# Patient Record
Sex: MALE | Race: BLACK OR AFRICAN AMERICAN | NOT HISPANIC OR LATINO | Employment: OTHER | URBAN - METROPOLITAN AREA
[De-identification: names, ages, dates, MRNs, and addresses within clinical notes are randomized per-mention and may not be internally consistent; named-entity substitution may affect disease eponyms.]

---

## 2017-04-03 ENCOUNTER — OFFICE VISIT (OUTPATIENT)
Dept: INTERNAL MEDICINE | Facility: CLINIC | Age: 72
End: 2017-04-03
Payer: MEDICARE

## 2017-04-03 ENCOUNTER — TELEPHONE (OUTPATIENT)
Dept: INTERNAL MEDICINE | Facility: CLINIC | Age: 72
End: 2017-04-03

## 2017-04-03 VITALS
DIASTOLIC BLOOD PRESSURE: 82 MMHG | SYSTOLIC BLOOD PRESSURE: 130 MMHG | BODY MASS INDEX: 24.64 KG/M2 | HEIGHT: 71 IN | HEART RATE: 64 BPM | WEIGHT: 176 LBS

## 2017-04-03 DIAGNOSIS — I10 ESSENTIAL HYPERTENSION: Primary | ICD-10-CM

## 2017-04-03 DIAGNOSIS — L02.91 ABSCESS: Primary | ICD-10-CM

## 2017-04-03 PROCEDURE — 3079F DIAST BP 80-89 MM HG: CPT | Mod: S$GLB,,, | Performed by: INTERNAL MEDICINE

## 2017-04-03 PROCEDURE — 1160F RVW MEDS BY RX/DR IN RCRD: CPT | Mod: S$GLB,,, | Performed by: INTERNAL MEDICINE

## 2017-04-03 PROCEDURE — 1125F AMNT PAIN NOTED PAIN PRSNT: CPT | Mod: S$GLB,,, | Performed by: INTERNAL MEDICINE

## 2017-04-03 PROCEDURE — 99213 OFFICE O/P EST LOW 20 MIN: CPT | Mod: S$GLB,,, | Performed by: INTERNAL MEDICINE

## 2017-04-03 PROCEDURE — 1159F MED LIST DOCD IN RCRD: CPT | Mod: S$GLB,,, | Performed by: INTERNAL MEDICINE

## 2017-04-03 PROCEDURE — 99999 PR PBB SHADOW E&M-EST. PATIENT-LVL III: CPT | Mod: PBBFAC,,, | Performed by: INTERNAL MEDICINE

## 2017-04-03 PROCEDURE — 3075F SYST BP GE 130 - 139MM HG: CPT | Mod: S$GLB,,, | Performed by: INTERNAL MEDICINE

## 2017-04-03 PROCEDURE — 1157F ADVNC CARE PLAN IN RCRD: CPT | Mod: S$GLB,,, | Performed by: INTERNAL MEDICINE

## 2017-04-03 RX ORDER — LOSARTAN POTASSIUM 100 MG/1
100 TABLET ORAL DAILY
Qty: 90 TABLET | Refills: 11 | Status: SHIPPED | OUTPATIENT
Start: 2017-04-03 | End: 2018-06-06 | Stop reason: SDUPTHER

## 2017-04-03 RX ORDER — SULFAMETHOXAZOLE AND TRIMETHOPRIM 800; 160 MG/1; MG/1
1 TABLET ORAL 2 TIMES DAILY
Qty: 14 TABLET | Refills: 0 | Status: SHIPPED | OUTPATIENT
Start: 2017-04-03 | End: 2017-06-08 | Stop reason: ALTCHOICE

## 2017-04-03 RX ORDER — HYDROCODONE BITARTRATE AND ACETAMINOPHEN 7.5; 325 MG/1; MG/1
1 TABLET ORAL EVERY 8 HOURS PRN
Qty: 30 TABLET | Refills: 0 | Status: SHIPPED | OUTPATIENT
Start: 2017-04-03 | End: 2018-01-06

## 2017-04-03 NOTE — TELEPHONE ENCOUNTER
Hi, I will send in Cozaar, but I am not sure whether his insurance will cover the brand name.  Thank you, Ted Paula

## 2017-04-03 NOTE — MR AVS SNAPSHOT
[facility]  [address]  [facility] [address]  Phone: [phone]  Fax: [phone]                  Herminio Burger   4/3/2017 11:20 AM   Office Visit    Description:  Male : 1945   Provider:  Karis Mayen MD   Department:  [facility]           Reason for Visit     Edema           Diagnoses this Visit        Comments    Abscess    -  Primary            To Do List           Future Appointments        Provider Department Dept Phone    2017 1:00 PM Jesus Wallis DNP [facility] [phone]      Goals (5 Years of Data)     None      Follow-Up and Disposition     Return in about 4 days (around 2017).    Follow-up and Disposition History       These Medications        Disp Refills Start End    sulfamethoxazole-trimethoprim 800-160mg (BACTRIM DS) 800-160 mg Tab 14 tablet 0 4/3/2017     Take 1 tablet by mouth 2 (two) times daily. - Oral    Pharmacy: [facility] [address] [place] [address] AT [facility] Ph #: [phone]       hydrocodone-acetaminophen 7.5-325mg (NORCO) 7.5-325 mg per tablet 30 tablet 0 4/3/2017     Take 1 tablet by mouth every 8 (eight) hours as needed for Pain. - Oral    Pharmacy: [facility] [address] AT [facility] Ph #: [phone]         [facility]     [facility] -  Assistance  Unless otherwise directed by your provider, please contact [facility], our nurse care line that is available for  assistance.     Registered nurses in the [facility] provide: appointment scheduling, clinical advisement, health education, and other advisory services.  Call: [phone] (toll free)               Medications           Message regarding Medications     Verify the changes and/or additions to your medication regime listed below are the same as discussed with your  clinician today.  If any of these changes or additions are incorrect, please notify your healthcare provider.        START taking these NEW medications        Refills    sulfamethoxazole-trimethoprim 800-160mg (BACTRIM DS) 800-160 mg Tab 0    Sig: Take 1 tablet by mouth 2 (two) times daily.    Class: Normal    Route: Oral      CHANGE how you are taking these medications     Start Taking Instead of    hydrocodone-acetaminophen 7.5-325mg (NORCO) 7.5-325 mg per tablet hydrocodone-acetaminophen 7.5-325mg (NORCO) 7.5-325 mg per tablet    Dosage:  Take 1 tablet by mouth every 8 (eight) hours as needed for Pain. Dosage:  TK 1 T PO  BID PRF SEVERE PAIN ONLY    Reason for Change:  Reorder            Verify that the below list of medications is an accurate representation of the medications you are currently taking.  If none reported, the list may be blank. If incorrect, please contact your healthcare provider. Carry this list with you in case of emergency.           Current Medications     diphenhydrAMINE (BENADRYL) 25 mg capsule Take 1 each (25 mg total) by mouth every 6 (six) hours as needed for Itching.    FOLIC ACID/MULTIVIT-MIN/LUTEIN (CENTRUM SILVER ORAL) Take by mouth.    hydrocodone-acetaminophen 7.5-325mg (NORCO) 7.5-325 mg per tablet Take 1 tablet by mouth every 8 (eight) hours as needed for Pain.    metoprolol tartrate (LOPRESSOR) 25 MG tablet Take 1 tablet (25 mg total) by mouth 2 (two) times daily.    triamcinolone acetonide 0.1% (KENALOG) 0.1 % ointment YEIMY TO RASH ON BODY BID PRN FOR ITCHING AND FLARES    valsartan (DIOVAN) 320 MG tablet TK 1 T PO QD FOR BP    vitamin E 1000 UNIT capsule Take 1,000 Units by mouth once daily.    sulfamethoxazole-trimethoprim 800-160mg (BACTRIM DS) 800-160 mg Tab Take 1 tablet by mouth 2 (two) times daily.           Clinical Reference Information           Your Vitals Were     BP Pulse Height Weight BMI    130/82 (BP Location: Left arm, Patient Position: Sitting, BP Method:  "Manual) 64 5' 11" (1.803 m) 79.8 kg (176 lb) 24.55 kg/m2      Blood Pressure          Most Recent Value    BP  130/82      Allergies as of 4/3/2017     No Known Allergies      Immunizations Administered on Date of Encounter - 4/3/2017     None      Instructions    Take bactrim twice daily. Take probiotics over the counter daily to help repopulate the good bacteria in the gut and to avoid antibiotic associated diarrhea.     Take norco as needed for pain up to 3x/day.     Warm compresses for 15 minutes 4x/day.        Language Assistance Services     ATTENTION: Language assistance services are available, free of charge. Please call [phone].      ATENCIÓN: Si habla elis, tiene a xiong disposición servicios gratuitos de asistencia lingüística. Llame al [phone].     IVRIN Ý: N?u b?n nói Ti?ng Vi?t, có các d?ch v? h? tr? ngôn ng? mi?n phí dành cho b?n. G?i s? [phone].         [facility] complies with applicable Federal civil rights laws and does not discriminate on the basis of race, color, national origin, age, disability, or sex.        "

## 2017-04-03 NOTE — PROGRESS NOTES
"Subjective:       Patient ID: Herminio Burger is a 71 y.o. male.    Chief Complaint: Edema (right nipple swelling)    HPI urgent, Dr. Paula  Woke up Saturday morning w/ R nipple pain and swelling. Growth in size since Saturday. Reports no drainage from the area. This has happened before but resolves on its own. Per wife, R nipple always bigger than the other. No fevers/chills. Using hot compresses but stopped yesterday as it wasn't improving.     Has norco 7.5-325mg prn back pain (rare use and only if severe pain)    Review of Systems      Objective:      Physical Exam    /82 (BP Location: Left arm, Patient Position: Sitting, BP Method: Manual)  Pulse 64  Ht 5' 11" (1.803 m)  Wt 79.8 kg (176 lb)  BMI 24.55 kg/m2    Gen - A+OX4, NAD  Skin - R areolar w/ small abscess that's very tender to palpation.     Assessment/Plan     Herminio was seen today for edema.    Diagnoses and all orders for this visit:    Abscess - RTC in 3 days. Discussed that if unresolved or worsens, will need I&D.   Warm compresses for 15 minutes 4x/day.   -     sulfamethoxazole-trimethoprim 800-160mg (BACTRIM DS) 800-160 mg Tab; Take 1 tablet by mouth 2 (two) times daily.  -     hydrocodone-acetaminophen 7.5-325mg (NORCO) 7.5-325 mg per tablet; Take 1 tablet by mouth every 8 (eight) hours as needed for Pain.      Return in about 4 days (around 4/7/2017).      Karis Mayen MD  Department of Internal Medicine - [facility]  11:33 AM  "

## 2017-04-03 NOTE — TELEPHONE ENCOUNTER
----- Message from Rita Bond sent at 4/3/2017  3:26 PM CDT -----  Contact: self  Pt said the meds he on is causing him to   valsartan (DIOVAN) 320 MG tablet 90 tablet 11 11/14/2016  N    Want tp know if he can go back on cozar

## 2017-04-03 NOTE — PATIENT INSTRUCTIONS
Take bactrim twice daily. Take probiotics over the counter daily to help repopulate the good bacteria in the gut and to avoid antibiotic associated diarrhea.     Take norco as needed for pain up to 3x/day.     Warm compresses for 15 minutes 4x/day.

## 2017-04-03 NOTE — TELEPHONE ENCOUNTER
Spoke with pt states that Valsartan is causing a rash. Requesting to go back to Cozaar but does not want generic. States that generic does not work. Please advise.

## 2017-06-08 ENCOUNTER — OFFICE VISIT (OUTPATIENT)
Dept: INTERNAL MEDICINE | Facility: CLINIC | Age: 72
End: 2017-06-08
Payer: MEDICARE

## 2017-06-08 VITALS
BODY MASS INDEX: 24.01 KG/M2 | HEIGHT: 71 IN | HEART RATE: 66 BPM | OXYGEN SATURATION: 97 % | WEIGHT: 171.5 LBS | SYSTOLIC BLOOD PRESSURE: 125 MMHG | DIASTOLIC BLOOD PRESSURE: 84 MMHG

## 2017-06-08 DIAGNOSIS — I70.0 ATHEROSCLEROSIS OF AORTA: ICD-10-CM

## 2017-06-08 DIAGNOSIS — I10 ESSENTIAL HYPERTENSION: ICD-10-CM

## 2017-06-08 DIAGNOSIS — Z00.00 ENCOUNTER FOR PREVENTIVE HEALTH EXAMINATION: Primary | ICD-10-CM

## 2017-06-08 DIAGNOSIS — Z98.890 HISTORY OF HERNIA REPAIR: ICD-10-CM

## 2017-06-08 DIAGNOSIS — J44.9 CHRONIC OBSTRUCTIVE PULMONARY DISEASE, UNSPECIFIED COPD TYPE: ICD-10-CM

## 2017-06-08 DIAGNOSIS — Z87.09 HISTORY OF PNEUMOTHORAX: ICD-10-CM

## 2017-06-08 DIAGNOSIS — Z87.19 HISTORY OF SMALL BOWEL OBSTRUCTION: ICD-10-CM

## 2017-06-08 DIAGNOSIS — Z87.19 HISTORY OF HERNIA REPAIR: ICD-10-CM

## 2017-06-08 DIAGNOSIS — Z01.00 ROUTINE EYE EXAM: ICD-10-CM

## 2017-06-08 PROCEDURE — 99999 PR PBB SHADOW E&M-EST. PATIENT-LVL IV: CPT | Mod: PBBFAC,,, | Performed by: NURSE PRACTITIONER

## 2017-06-08 PROCEDURE — G0439 PPPS, SUBSEQ VISIT: HCPCS | Mod: S$GLB,,, | Performed by: NURSE PRACTITIONER

## 2017-06-08 PROCEDURE — 99499 UNLISTED E&M SERVICE: CPT | Mod: S$GLB,,, | Performed by: NURSE PRACTITIONER

## 2017-06-08 NOTE — PATIENT INSTRUCTIONS
Counseling and Referral of Other Preventative  (Italic type indicates deductible and co-insurance are waived)    Patient Name: Herminio Burger  Today's Date: 6/8/2017      SERVICE LIMITATIONS RECOMMENDATION    Vaccines    · Pneumococcal (once after 65)    · Influenza (annually)    · Hepatitis B (if medium/high risk)    · Prevnar 13      Hepatitis B medium/high risk factors:       - End-stage renal disease       - Hemophiliacs who received Factor VII or         IX concentrates       - Clients of institutions for the mentally             retarded       - Persons who live in the same house as          a HepB carrier       - Homosexual men       - Illicit injectable drug abusers     Pneumococcal: N/A     Influenza: Recommended to patient, declined     Hepatitis B: N/A     Prevnar 13: Done, no repeat necessary    Prostate cancer screening (annually to age 75)     Prostate specific antigen (PSA) Shared decision making with Provider. Sometimes a co-pay may be required if the patient decides to have this test. The USPSTF no longer recommends prostate cancer screening routinely in medicine: every 1 year    Colorectal cancer screening (to age 75)    · Fecal occult blood test (annual)  · Flexible sigmoidoscopy (5y)  · Screening colonoscopy (10y)  · Barium enema   Last done 2014, recommend to repeat every 10  years    Diabetes self-management training (no USPSTF recommendations)  Requires referral by treating physician for patient with diabetes or renal disease. 10 hours of initial DSMT sessions of no less than 30 minutes each in a continuous 12-month period. 2 hours of follow-up DSMT in subsequent years.  N/A    Glaucoma screening (no USPSTF recommendation)  Diabetes mellitus, family history   , age 50 or over    American, age 65 or over  Scheduled, see appointments    Medical nutrition therapy for diabetes or renal disease (no recommended schedule)  Requires referral by treating physician for patient  with diabetes or renal disease or kidney transplant within the past 3 years.  Can be provided in same year as diabetes self-management training (DSMT), and CMS recommends medical nutrition therapy take place after DSMT. Up to 3 hours for initial year and 2 hours in subsequent years.  N/A    Cardiovascular screening blood tests (every 5 years)  · Fasting lipid panel  Order as a panel if possible  Done this year, repeat every year    Diabetes screening tests (at least every 3 years, Medicare covers annually or at 6-month intervals for prediabetic patients)  · Fasting blood sugar (FBS) or glucose tolerance test (GTT)  Patient must be diagnosed with one of the following:       - Hypertension       - Dyslipidemia       - Obesity (BMI 30kg/m2)       - Previous elevated impaired FBS or GTT       ... or any two of the following:       - Overweight (BMI 25 but <30)       - Family history of diabetes       - Age 65 or older       - History of gestational diabetes or birth of baby weighing more than 9 pounds  Done this year, repeat every year    Abdominal aortic aneurysm screening (once)  · Sonogram   Limited to patients who meet one of the following criteria:       - Men who are 65-75 years old and have smoked more than 100 cigarette in their lifetime       - Anyone with a family history of abdominal aortic aneurysm       - Anyone recommended for screening by the USPSTF  N/A-Done    HIV screening (annually for increased risk patients)  · HIV-1 and HIV-2 by EIA, or COOPER, rapid antibody test or oral mucosa transudate  Patients must be at increased risk for HIV infection per USPSTF guidelines or pregnant. Tests covered annually for patient at increased risk or as requested by the patient. Pregnant patients may receive up to 3 tests during pregnancy.  Risks discussed, screening is not recommended    Smoking cessation counseling (up to 8 sessions per year)  Patients must be asymptomatic of tobacco-related conditions to receive as  a preventative service.  Non-smoker    Subsequent annual wellness visit  At least 12 months since last AWV  Return in one year     The following information is provided to all patients.  This information is to help you find resources for any of the problems found today that may be affecting your health:                Living healthy guide: www.[facility].[place].[facility]      Understanding Diabetes: www.diabetes.org      Eating healthy: www.cdc.gov/healthyweight      CDC home safety checklist: www.cdc.gov/steadi/patient.html      Agency on Aging: www.goea.[place].[facility]      Alcoholics anonymous (AA): www.aa.org      Physical Activity: www.[place].nih.gov/ga9boaj      Tobacco use: www.quitwithusla.org

## 2017-06-09 PROBLEM — Z87.19 HISTORY OF SMALL BOWEL OBSTRUCTION: Status: ACTIVE | Noted: 2017-06-09

## 2017-06-09 PROBLEM — Z87.19 HISTORY OF HERNIA REPAIR: Status: ACTIVE | Noted: 2017-06-09

## 2017-06-09 PROBLEM — I70.0 ATHEROSCLEROSIS OF AORTA: Status: ACTIVE | Noted: 2017-06-09

## 2017-06-09 PROBLEM — Z98.890 HISTORY OF HERNIA REPAIR: Status: ACTIVE | Noted: 2017-06-09

## 2017-06-09 NOTE — PROGRESS NOTES
"Herminio Burger presented for a  Medicare AWV and comprehensive Health Risk Assessment today. The following components were reviewed and updated:    · Medical history  · Family History  · Social history  · Allergies and Current Medications  · Health Risk Assessment  · Health Maintenance  · Care Team     ** See Completed Assessments for Annual Wellness Visit within the encounter summary.**       The following assessments were completed:  · Living Situation  · CAGE  · Depression Screening  · Timed Get Up and Go  · Whisper Test  · Cognitive Function Screening  · Nutrition Screening  · ADL Screening  · PAQ Screening    Vitals:    06/08/17 1441   BP: 125/84   Pulse: 66   SpO2: 97%   Weight: 77.8 kg (171 lb 8.3 oz)   Height: 5' 11" (1.803 m)     Body mass index is 23.92 kg/m².  Physical Exam   Constitutional: He is oriented to person, place, and time. He appears well-developed and well-nourished.   HENT:   Head: Normocephalic and atraumatic.   Nose: Nose normal.   Eyes: Conjunctivae and EOM are normal.   Neck: Normal range of motion. Neck supple.   Cardiovascular: Normal rate, regular rhythm, normal heart sounds and intact distal pulses.    Pulmonary/Chest: Effort normal and breath sounds normal.   Musculoskeletal: Normal range of motion.   Neurological: He is alert and oriented to person, place, and time.   Skin: Skin is warm and dry.   Psychiatric: He has a normal mood and affect. His behavior is normal. Judgment and thought content normal.   Nursing note and vitals reviewed.        Diagnoses and health risks identified today and associated recommendations/orders:    1. Encounter for preventive health examination  Assessment performed. Health maintenance updated. Chart review completed.    2. Atherosclerosis of aorta  Noted on imaging 5/23/2016. Stable with BP control. Followed by PCP.    3. Routine eye exam  - Ambulatory Referral to Optometry    4. Chronic obstructive pulmonary disease, unspecified COPD type  Chronic, " stable. No longer a smoker. Encouragement provided. Followed by PCP.    5. History of hernia repair  Stable. Followed by Gastroenterology.    6. History of small bowel obstruction  Stable. Followed by Gastroenterology.    7. History of pneumothorax  Stable. Followed by PCP.    8. Essential hypertension  Stable on current medication regimen. Followed by PCP.    Provided Herminio with a 5-10 year written screening schedule and personal prevention plan. Recommendations were developed using the USPSTF age appropriate recommendations. Education, counseling, and referrals were provided as needed. After Visit Summary printed and given to patient which includes a list of additional screenings\tests needed.    Return for follow up with Primary Care Provider as instructed, ;sooner if problems, HRA in 1 year.    GEORGE Peterson

## 2017-06-19 ENCOUNTER — INITIAL CONSULT (OUTPATIENT)
Dept: OPTOMETRY | Facility: CLINIC | Age: 72
End: 2017-06-19
Payer: MEDICARE

## 2017-06-19 DIAGNOSIS — H52.223 REGULAR ASTIGMATISM OF BOTH EYES: ICD-10-CM

## 2017-06-19 DIAGNOSIS — H35.033 HYPERTENSIVE RETINOPATHY OF BOTH EYES: ICD-10-CM

## 2017-06-19 DIAGNOSIS — H25.13 NUCLEAR SCLEROTIC CATARACT OF BOTH EYES: Primary | ICD-10-CM

## 2017-06-19 PROCEDURE — 92015 DETERMINE REFRACTIVE STATE: CPT | Mod: S$GLB,,, | Performed by: OPTOMETRIST

## 2017-06-19 PROCEDURE — 99999 PR PBB SHADOW E&M-EST. PATIENT-LVL II: CPT | Mod: PBBFAC,,, | Performed by: OPTOMETRIST

## 2017-06-19 PROCEDURE — 92004 COMPRE OPH EXAM NEW PT 1/>: CPT | Mod: S$GLB,,, | Performed by: OPTOMETRIST

## 2017-06-19 RX ORDER — VALSARTAN 320 MG/1
TABLET ORAL
COMMUNITY
Start: 2017-06-06 | End: 2018-03-06

## 2017-06-19 NOTE — PATIENT INSTRUCTIONS
CATARACT    Symptoms and Signs:  A cataract starts out small, and at first has little effect on your vision. You may notice that your vision is blurred a little, like looking through a cloudy piece of glass or viewing an impressionist painting. A cataract may make light from the sun or a lamp seem too bright or glaring. Or you may notice when you drive at night that the oncoming headlights cause more glare than before. Colors may not appear as bright as they once did.  The type of cataract you have will affect exactly which symptoms you experience and how soon they will occur. When a nuclear cataract first develops it can bring about a temporary improvement in your near vision, called second sight. Unfortunately, the improved vision is short-lived and will disappear as the cataract worsens. Meanwhile, a sub-capsular cataract may not produce any symptoms until it's well-developed.    Causes:  No one knows for sure why the eye's lens changes as we age, forming cataracts. Researchers are gradually identifying factors that may cause cataracts - and information that may help to prevent them.  Many studies suggest that exposure to ultraviolet light is associated with cataracts, so eye care practitioners recommend wearing sunglasses and a wide-brimmed hat to lessen your exposure.  Other studies suggest people with diabetes are at risk for developing a cataract.   Some eye care practitioners believe that a diet high in antioxidants, such as beta-carotene (vitamin A), selenium and vitamins C and E, may forestall cataracts.  The most important of these is probably vitamin C; it might be helpful to supplement the diet with an extra Vitamin C tablet.  Meanwhile, eating a lot of salt may increase your risk.  Other risk factors include cigarette smoke, air pollution and heavy alcohol consumption.  We simply recommend that you be careful to use sunglasses and to take Vitamin C.    Treatment:  When symptoms begin to appear, we can  improve your vision for a while using new glasses, strong bifocals, magnification, appropriate lighting or other visual aids.  This is true in your case; your cataract does not impact your vision very much at this time. If you experience any of the symptoms we described you can return at any time. Otherwise it is fine to see you in 1 year.    ==============================================    FLASHES AND FLOATERS    You've noticed something new in your field of vision, possibly one or more movable spots or squiggles that you can't remember seeing before. Here are some points to follow and some information about this condition:    Located in the hollow interior of the eye is a transparent gel called the VITREOUS. It is composed of microscopic fibers, large molecules, and fluid-like water.  Most people also have small pieces of material suspended within the vitreous, which under ideal lighting conditions will cast a shadow on the retina and become visible as moving spots, which are called vitreous floaters. Floaters become more common with age.    During life the vitreous fibers condense together and the gel becomes more fluid. Fibrous clumps can form which may become large enough to be seen as floaters. As the fibers condense the liquid portion escapes, allowing the vitreous to shrink, separate from the retina, and collapse forward in the eye. While shrinking, the vitreous gel can pull against the retina causing episodes of light flashes or arcs of light.  The retina does not have pain sensors, and its only response is flashes of light. People often describe these flashes as lightning streaks or fireworks.     A concern is that traction on the retina from the shrinking vitreous may pull a tear in the retina, which could evolve into a retinal detachment.  Therefore persistent flashes do require urgent evaluation. Other symptoms that require urgent evaluation are a large shadow, curtain, or blank spot in your vision, or  a sudden shower of dozens or hundreds of tiny floaters resembling pepper or soot.    To summarize, nearly everyone has floaters, but a sudden shower of dots, persistent light flashes in one eye, or a curtain or shadow in your vision require urgent consultation.  If these occur, please let us know immediately.

## 2017-06-19 NOTE — PROGRESS NOTES
HPI     Mr. Burger was referred by GEORGE Lopez for a routine eye   examination    Pt reports clear vision all ranges with glasses. He does not plan on   ordering new glasses, but requests refraction today.   (+)occasional floaters. Denies flashes, diplopia, photophobia, glare,   HA's, or pain.     Diabetic no  Hemoglobin A1C       Date                     Value               Ref Range             Status                04/16/2014               7.1 (H)             4.5 - 6.2 %           Final            ----------    OCULAR HISTORY (pt-reported)  Last Eye Exam: 2012 in [place], [place]  (-)eye surgeries  (+)eye injuries: pt says a June bug hit his eye many years ago.  Cataracts OU         Last edited by Diana Daniel, OD on 6/19/2017  3:37 PM. (History)            Assessment /Plan     For exam results, see Encounter Report.    Nuclear sclerotic cataract of both eyes   Not visually significant OU. Monitor.      Hypertensive retinopathy of both eyes   Mild OU, likely from before BP was brought under control. Patient educated on findings, discussed potential risk for retinal vascular occlusions. Monitor yearly, or RTC immediately with any vision changes.    Regular astigmatism of both eyes   Relatively stable refractive error OU. New glasses prescription released, adaptation expected.  New glasses optional.   Eyeglass Final Rx     Eyeglass Final Rx       Sphere Cylinder Axis Add    Right +0.25 +1.00 170 +2.50    Left -0.50 +1.25 025 +2.50    Expiration Date:  6/20/2018    Comments:  Opposite signs correct                 RTC 1 year, or sooner prn

## 2018-01-06 ENCOUNTER — HOSPITAL ENCOUNTER (OUTPATIENT)
Dept: RADIOLOGY | Facility: HOSPITAL | Age: 73
Discharge: HOME | End: 2018-01-06
Attending: INTERNAL MEDICINE
Payer: MEDICARE

## 2018-01-06 ENCOUNTER — OFFICE VISIT (OUTPATIENT)
Dept: INTERNAL MEDICINE | Facility: CLINIC | Age: 73
End: 2018-01-06
Payer: MEDICARE

## 2018-01-06 DIAGNOSIS — M25.519 SHOULDER PAIN, UNSPECIFIED CHRONICITY, UNSPECIFIED LATERALITY: ICD-10-CM

## 2018-01-06 DIAGNOSIS — M54.9 BACK PAIN, UNSPECIFIED BACK LOCATION, UNSPECIFIED BACK PAIN LATERALITY, UNSPECIFIED CHRONICITY: ICD-10-CM

## 2018-01-06 DIAGNOSIS — M54.2 NECK PAIN: Primary | ICD-10-CM

## 2018-01-06 DIAGNOSIS — M54.2 NECK PAIN: ICD-10-CM

## 2018-01-06 PROCEDURE — 73030 X-RAY EXAM OF SHOULDER: CPT | Mod: 26,RT,, | Performed by: RADIOLOGY

## 2018-01-06 PROCEDURE — 72050 X-RAY EXAM NECK SPINE 4/5VWS: CPT | Mod: TC

## 2018-01-06 PROCEDURE — 73030 X-RAY EXAM OF SHOULDER: CPT | Mod: TC,RT

## 2018-01-06 PROCEDURE — 99999 PR PBB SHADOW E&M-EST. PATIENT-LVL IV: CPT | Mod: PBBFAC,,, | Performed by: INTERNAL MEDICINE

## 2018-01-06 PROCEDURE — 72050 X-RAY EXAM NECK SPINE 4/5VWS: CPT | Mod: 26,,, | Performed by: RADIOLOGY

## 2018-01-06 PROCEDURE — 72110 X-RAY EXAM L-2 SPINE 4/>VWS: CPT | Mod: 26,,, | Performed by: RADIOLOGY

## 2018-01-06 PROCEDURE — 99215 OFFICE O/P EST HI 40 MIN: CPT | Mod: S$GLB,,, | Performed by: INTERNAL MEDICINE

## 2018-01-06 PROCEDURE — 72110 X-RAY EXAM L-2 SPINE 4/>VWS: CPT | Mod: TC

## 2018-01-06 RX ORDER — HYDROCODONE BITARTRATE AND ACETAMINOPHEN 5; 325 MG/1; MG/1
1 TABLET ORAL EVERY 8 HOURS PRN
Qty: 21 TABLET | Refills: 0 | Status: SHIPPED | OUTPATIENT
Start: 2018-01-06 | End: 2018-06-06 | Stop reason: SDUPTHER

## 2018-01-13 VITALS
SYSTOLIC BLOOD PRESSURE: 138 MMHG | BODY MASS INDEX: 24.69 KG/M2 | HEART RATE: 78 BPM | HEIGHT: 71 IN | TEMPERATURE: 98 F | DIASTOLIC BLOOD PRESSURE: 88 MMHG | WEIGHT: 176.38 LBS

## 2018-01-28 DIAGNOSIS — I49.9 IRREGULAR HEART BEAT: ICD-10-CM

## 2018-01-29 RX ORDER — METOPROLOL TARTRATE 25 MG/1
TABLET, FILM COATED ORAL
Qty: 180 TABLET | Refills: 0 | Status: SHIPPED | OUTPATIENT
Start: 2018-01-29 | End: 2018-06-06

## 2018-03-06 ENCOUNTER — IMMUNIZATION (OUTPATIENT)
Dept: INTERNAL MEDICINE | Facility: CLINIC | Age: 73
End: 2018-03-06
Payer: MEDICARE

## 2018-03-06 ENCOUNTER — OFFICE VISIT (OUTPATIENT)
Dept: INTERNAL MEDICINE | Facility: CLINIC | Age: 73
End: 2018-03-06
Payer: MEDICARE

## 2018-03-06 ENCOUNTER — LAB VISIT (OUTPATIENT)
Dept: LAB | Facility: HOSPITAL | Age: 73
End: 2018-03-06
Attending: INTERNAL MEDICINE
Payer: MEDICARE

## 2018-03-06 VITALS
WEIGHT: 174.63 LBS | OXYGEN SATURATION: 97 % | HEIGHT: 71 IN | BODY MASS INDEX: 24.45 KG/M2 | TEMPERATURE: 98 F | HEART RATE: 71 BPM | DIASTOLIC BLOOD PRESSURE: 80 MMHG | SYSTOLIC BLOOD PRESSURE: 145 MMHG

## 2018-03-06 DIAGNOSIS — Z87.891 EX-SMOKER: ICD-10-CM

## 2018-03-06 DIAGNOSIS — J44.9 CHRONIC OBSTRUCTIVE PULMONARY DISEASE, UNSPECIFIED COPD TYPE: ICD-10-CM

## 2018-03-06 DIAGNOSIS — Z12.9 SCREENING FOR CANCER: ICD-10-CM

## 2018-03-06 DIAGNOSIS — Z12.2 ENCOUNTER FOR SCREENING FOR LUNG CANCER: ICD-10-CM

## 2018-03-06 DIAGNOSIS — I70.0 ATHEROSCLEROSIS OF AORTA: ICD-10-CM

## 2018-03-06 DIAGNOSIS — Z23 NEED FOR PNEUMOCOCCAL VACCINATION: ICD-10-CM

## 2018-03-06 DIAGNOSIS — I10 ESSENTIAL HYPERTENSION: ICD-10-CM

## 2018-03-06 DIAGNOSIS — J44.1 COPD EXACERBATION: Primary | ICD-10-CM

## 2018-03-06 DIAGNOSIS — Z23 NEED FOR INFLUENZA VACCINATION: ICD-10-CM

## 2018-03-06 DIAGNOSIS — R93.89 ABNORMAL CHEST CT: ICD-10-CM

## 2018-03-06 LAB
ALBUMIN SERPL BCP-MCNC: 3.8 G/DL
ALP SERPL-CCNC: 81 U/L
ALT SERPL W/O P-5'-P-CCNC: 23 U/L
ANION GAP SERPL CALC-SCNC: 8 MMOL/L
AST SERPL-CCNC: 31 U/L
BASOPHILS # BLD AUTO: 0.02 K/UL
BASOPHILS NFR BLD: 0.4 %
BILIRUB SERPL-MCNC: 1.1 MG/DL
BUN SERPL-MCNC: 10 MG/DL
CALCIUM SERPL-MCNC: 9.7 MG/DL
CHLORIDE SERPL-SCNC: 104 MMOL/L
CHOLEST SERPL-MCNC: 151 MG/DL
CHOLEST/HDLC SERPL: 3.5 {RATIO}
CO2 SERPL-SCNC: 27 MMOL/L
CREAT SERPL-MCNC: 1.2 MG/DL
DIFFERENTIAL METHOD: ABNORMAL
EOSINOPHIL # BLD AUTO: 0.1 K/UL
EOSINOPHIL NFR BLD: 1.6 %
ERYTHROCYTE [DISTWIDTH] IN BLOOD BY AUTOMATED COUNT: 14.4 %
EST. GFR  (AFRICAN AMERICAN): >60 ML/MIN/1.73 M^2
EST. GFR  (NON AFRICAN AMERICAN): 59.2 ML/MIN/1.73 M^2
GLUCOSE SERPL-MCNC: 97 MG/DL
HCT VFR BLD AUTO: 46.7 %
HDLC SERPL-MCNC: 43 MG/DL
HDLC SERPL: 28.5 %
HGB BLD-MCNC: 15.8 G/DL
LDLC SERPL CALC-MCNC: 91.6 MG/DL
LYMPHOCYTES # BLD AUTO: 1.5 K/UL
LYMPHOCYTES NFR BLD: 29.8 %
MCH RBC QN AUTO: 30.2 PG
MCHC RBC AUTO-ENTMCNC: 33.8 G/DL
MCV RBC AUTO: 89 FL
MONOCYTES # BLD AUTO: 0.9 K/UL
MONOCYTES NFR BLD: 17.1 %
NEUTROPHILS # BLD AUTO: 2.5 K/UL
NEUTROPHILS NFR BLD: 50.9 %
NONHDLC SERPL-MCNC: 108 MG/DL
PLATELET # BLD AUTO: 161 K/UL
PMV BLD AUTO: 11.1 FL
POTASSIUM SERPL-SCNC: 4.2 MMOL/L
PROT SERPL-MCNC: 8.4 G/DL
RBC # BLD AUTO: 5.23 M/UL
SODIUM SERPL-SCNC: 139 MMOL/L
TRIGL SERPL-MCNC: 82 MG/DL
WBC # BLD AUTO: 4.97 K/UL

## 2018-03-06 PROCEDURE — G0008 ADMIN INFLUENZA VIRUS VAC: HCPCS | Mod: S$GLB,,, | Performed by: INTERNAL MEDICINE

## 2018-03-06 PROCEDURE — 85025 COMPLETE CBC W/AUTO DIFF WBC: CPT

## 2018-03-06 PROCEDURE — 99999 PR PBB SHADOW E&M-EST. PATIENT-LVL IV: CPT | Mod: PBBFAC,,, | Performed by: INTERNAL MEDICINE

## 2018-03-06 PROCEDURE — 36415 COLL VENOUS BLD VENIPUNCTURE: CPT

## 2018-03-06 PROCEDURE — 99214 OFFICE O/P EST MOD 30 MIN: CPT | Mod: S$GLB,,, | Performed by: INTERNAL MEDICINE

## 2018-03-06 PROCEDURE — 99499 UNLISTED E&M SERVICE: CPT | Mod: S$GLB,,, | Performed by: INTERNAL MEDICINE

## 2018-03-06 PROCEDURE — 80053 COMPREHEN METABOLIC PANEL: CPT

## 2018-03-06 PROCEDURE — G0009 ADMIN PNEUMOCOCCAL VACCINE: HCPCS | Mod: S$GLB,,, | Performed by: INTERNAL MEDICINE

## 2018-03-06 PROCEDURE — 90732 PPSV23 VACC 2 YRS+ SUBQ/IM: CPT | Mod: S$GLB,,, | Performed by: INTERNAL MEDICINE

## 2018-03-06 PROCEDURE — 3077F SYST BP >= 140 MM HG: CPT | Mod: S$GLB,,, | Performed by: INTERNAL MEDICINE

## 2018-03-06 PROCEDURE — 3079F DIAST BP 80-89 MM HG: CPT | Mod: S$GLB,,, | Performed by: INTERNAL MEDICINE

## 2018-03-06 PROCEDURE — 90662 IIV NO PRSV INCREASED AG IM: CPT | Mod: S$GLB,,, | Performed by: INTERNAL MEDICINE

## 2018-03-06 PROCEDURE — 80061 LIPID PANEL: CPT

## 2018-03-06 RX ORDER — ALBUTEROL SULFATE 90 UG/1
2 AEROSOL, METERED RESPIRATORY (INHALATION) EVERY 6 HOURS PRN
Qty: 18 G | Refills: 11 | Status: SHIPPED | OUTPATIENT
Start: 2018-03-06 | End: 2019-11-20 | Stop reason: SDUPTHER

## 2018-03-06 RX ORDER — CODEINE PHOSPHATE AND GUAIFENESIN 10; 100 MG/5ML; MG/5ML
5 SOLUTION ORAL 3 TIMES DAILY PRN
Qty: 236 ML | Refills: 0 | Status: SHIPPED | OUTPATIENT
Start: 2018-03-06 | End: 2018-07-16 | Stop reason: SDUPTHER

## 2018-03-06 RX ORDER — AMOXICILLIN AND CLAVULANATE POTASSIUM 875; 125 MG/1; MG/1
1 TABLET, FILM COATED ORAL 2 TIMES DAILY
Qty: 14 TABLET | Refills: 0 | Status: SHIPPED | OUTPATIENT
Start: 2018-03-06 | End: 2018-03-13

## 2018-03-06 NOTE — PATIENT INSTRUCTIONS
"Take an over the counter "super" probiotic with multiple bacteria strains daily while on antibiotic and continue for 7 days after the antibiotic has completed. Examples include:  Thomas Super Probiotic Digestive Support Capsules  Nature Made Digestive Probiotics Advanced      "

## 2018-03-06 NOTE — PROGRESS NOTES
Subjective:       Patient ID: Herminio Burger is a 72 y.o. male.    Chief Complaint: Follow-up and Cough    Has cough and cold.  Pulled muscle coughing.  Chest tightness with coughing as well. Taking OTC syrups. 1 week of cough. Cough productive yellow/green. dtr had been sick as well.    Somewhat constipated as well, having stools though scabulous in nature.      Review of Systems   Constitutional: Positive for activity change. Negative for fever and unexpected weight change.   HENT: Negative for congestion.    Respiratory: Positive for cough, chest tightness and wheezing.    Cardiovascular: Positive for chest pain (muscular with cough). Negative for palpitations and leg swelling.   Gastrointestinal: Positive for constipation. Negative for abdominal distention, abdominal pain, nausea and vomiting.   Genitourinary: Negative for decreased urine volume and difficulty urinating.   Musculoskeletal: Negative for arthralgias and joint swelling.   Skin: Negative for rash and wound.   Neurological: Negative for tremors, syncope and weakness.   Psychiatric/Behavioral: Negative for confusion.       Objective:      Physical Exam   Constitutional: He is oriented to person, place, and time. He appears well-developed and well-nourished. No distress.   Not toxic appearing   HENT:   Head: Normocephalic and atraumatic.   Eyes: No scleral icterus.   Neck: Normal range of motion. No thyromegaly present.   Cardiovascular: Normal rate, regular rhythm and normal heart sounds.  Exam reveals no gallop and no friction rub.    No murmur heard.  Pulmonary/Chest: Effort normal. No respiratory distress. He has wheezes. He has no rales.   Moderate wheeze   Abdominal: Soft. Bowel sounds are normal. He exhibits no distension and no mass. There is no tenderness. There is no rebound and no guarding.   Multiple abd scars   Musculoskeletal: Normal range of motion. He exhibits no edema.   Lymphadenopathy:     He has no cervical adenopathy.  "  Neurological: He is alert and oriented to person, place, and time.   Skin: No lesion noted. He is not diaphoretic.   Psychiatric: He has a normal mood and affect. Thought content normal.       Assessment:       1. COPD exacerbation    2. Need for influenza vaccination    3. Need for pneumococcal vaccination    4. Essential hypertension    5. Chronic obstructive pulmonary disease, unspecified COPD type    6. Atherosclerosis of aorta    7. Screening for cancer    8. Ex-smoker    9. Encounter for screening for lung cancer    10. Abnormal chest CT        Plan:       Herminio was seen today for follow-up and cough.    Diagnoses and all orders for this visit:    COPD exacerbation  -     amoxicillin-clavulanate 875-125mg (AUGMENTIN) 875-125 mg per tablet; Take 1 tablet by mouth 2 (two) times daily.  -     guaifenesin-codeine 100-10 mg/5 ml (TUSSI-ORGANIDIN NR)  mg/5 mL syrup; Take 5 mLs by mouth 3 (three) times daily as needed for Cough.  -     albuterol 90 mcg/actuation inhaler; Inhale 2 puffs into the lungs every 6 (six) hours as needed for Wheezing.  Explained that if not better in 1-2 weeks, pt should rtc/call PCP  Take an over the counter "super" probiotic with multiple bacteria strains daily while on antibiotic and continue for 7 days after the antibiotic has completed. Examples include:  Thomas Super Probiotic Digestive Support Capsules  Nature Made Digestive Probiotics Advanced          Need for influenza vaccination  -     Influenza - High Dose (65+) (PF) (IM)  Need for pneumococcal vaccination  -     Pneumococcal Polysaccharide Vaccine (23 Valent) (SQ/IM)    Essential hypertension  -     CBC auto differential; Future  -     Lipid panel; Future  -     Comprehensive metabolic panel; Future  4 week LPN pressure check      Chronic obstructive pulmonary disease, unspecified COPD type  Consider symbicort if not btr    Atherosclerosis of aorta  -     Lipid panel; Future    Screening for cancer  -     Cancel: CT " Chest Lung Screening Low Dose; Future  -     CT Chest Lung Screening Low Dose; Future  Ex-smoker  -     Cancel: CT Chest Lung Screening Low Dose; Future  -     CT Chest Lung Screening Low Dose; Future  Encounter for screening for lung cancer  -     Cancel: CT Chest Lung Screening Low Dose; Future  -     CT Chest Lung Screening Low Dose; Future  Abnormal chest CT  -     CT Chest Lung Screening Low Dose; Future        Health Maintenance       Date Due Completion Date    High Dose Statin 12/24/1964 ---    Influenza Vaccine 08/01/2017 ---    Pneumococcal (65+) (2 of 2 - PPSV23) 11/14/2017 11/14/2016    Zoster Vaccine 06/08/2018 (Originally 12/24/2003) ---    TETANUS VACCINE 06/08/2018 (Originally 12/24/1961) ---    Lipid Panel 11/14/2021 11/14/2016    Colonoscopy 10/06/2024 10/6/2014          Follow-up in about 3 months (around 6/6/2018).

## 2018-03-08 ENCOUNTER — TELEPHONE (OUTPATIENT)
Dept: INTERNAL MEDICINE | Facility: CLINIC | Age: 73
End: 2018-03-08

## 2018-03-08 ENCOUNTER — HOSPITAL ENCOUNTER (OUTPATIENT)
Dept: RADIOLOGY | Facility: HOSPITAL | Age: 73
Discharge: HOME | End: 2018-03-08
Attending: INTERNAL MEDICINE

## 2018-03-08 DIAGNOSIS — Z12.2 ENCOUNTER FOR SCREENING FOR LUNG CANCER: ICD-10-CM

## 2018-03-08 DIAGNOSIS — R91.8 LUNG MASS: Primary | ICD-10-CM

## 2018-03-08 DIAGNOSIS — Z12.9 SCREENING FOR CANCER: ICD-10-CM

## 2018-03-08 DIAGNOSIS — R93.89 ABNORMAL CHEST CT: ICD-10-CM

## 2018-03-08 DIAGNOSIS — Z87.891 EX-SMOKER: ICD-10-CM

## 2018-03-08 PROCEDURE — 76497 UNLISTED CT PROCEDURE: CPT | Mod: TC

## 2018-03-08 NOTE — TELEPHONE ENCOUNTER
Hi, please call back  [phone]  And schedule him to see thoracic surgery, order is in.  Thank you, Ted Christina's staff    I called him and explained chest CT result to him and his wife and his blood work results. He will go to ED if significant amount of blood coughing up.

## 2018-03-08 NOTE — TELEPHONE ENCOUNTER
----- Message from Salima Yip sent at 3/8/2018  3:18 PM CST -----  Patient came in this afternoon, to let Dr. Patten know that when he saw him on Tuesday he was not spitting up any blood, but now he is today (Thursday).  I asked if it were a lot and he stated not a lot, strings in mucus produced.  Patient can be reached at [phone].  Thanks

## 2018-03-12 ENCOUNTER — TELEPHONE (OUTPATIENT)
Dept: CARDIOTHORACIC SURGERY | Facility: CLINIC | Age: 73
End: 2018-03-12

## 2018-03-12 NOTE — TELEPHONE ENCOUNTER
Received referral from Dr. Paula for pt with recent changes to CT scan that reports a solid lesion with groundglass halo and central lucency abutting the aorta.  Agreeable with seeing Dr. Christina on 3/21.  Instructions on where to go for his appointment at [facility]. Agreeable with date and time.  Will attempt to obtain additional CT copies from [facility] and bring during initial consultation.

## 2018-03-19 ENCOUNTER — TELEPHONE (OUTPATIENT)
Dept: CARDIOTHORACIC SURGERY | Facility: CLINIC | Age: 73
End: 2018-03-19

## 2018-03-19 NOTE — TELEPHONE ENCOUNTER
Called to remind pt to bring previous CT scans from [facility].  Confirms that he has all copies of CT's on CD and will bring to consult appointment.

## 2018-03-21 ENCOUNTER — OFFICE VISIT (OUTPATIENT)
Dept: CARDIOTHORACIC SURGERY | Facility: CLINIC | Age: 73
End: 2018-03-21
Payer: MEDICARE

## 2018-03-21 VITALS
SYSTOLIC BLOOD PRESSURE: 159 MMHG | HEIGHT: 71 IN | WEIGHT: 173.75 LBS | OXYGEN SATURATION: 96 % | BODY MASS INDEX: 24.32 KG/M2 | DIASTOLIC BLOOD PRESSURE: 90 MMHG | HEART RATE: 77 BPM

## 2018-03-21 DIAGNOSIS — R91.1 LUNG NODULE: ICD-10-CM

## 2018-03-21 PROCEDURE — 3077F SYST BP >= 140 MM HG: CPT | Mod: CPTII,S$GLB,, | Performed by: THORACIC SURGERY (CARDIOTHORACIC VASCULAR SURGERY)

## 2018-03-21 PROCEDURE — 99499 UNLISTED E&M SERVICE: CPT | Mod: S$GLB,,, | Performed by: THORACIC SURGERY (CARDIOTHORACIC VASCULAR SURGERY)

## 2018-03-21 PROCEDURE — 99999 PR PBB SHADOW E&M-EST. PATIENT-LVL III: CPT | Mod: PBBFAC,,, | Performed by: THORACIC SURGERY (CARDIOTHORACIC VASCULAR SURGERY)

## 2018-03-21 PROCEDURE — 3080F DIAST BP >= 90 MM HG: CPT | Mod: CPTII,S$GLB,, | Performed by: THORACIC SURGERY (CARDIOTHORACIC VASCULAR SURGERY)

## 2018-03-21 PROCEDURE — 99204 OFFICE O/P NEW MOD 45 MIN: CPT | Mod: S$GLB,,, | Performed by: THORACIC SURGERY (CARDIOTHORACIC VASCULAR SURGERY)

## 2018-03-21 NOTE — LETTER
[facility] - Thoracic Surgery  [address]  [facility] [address]  Phone: [phone]  Fax: [phone] March 21, 2018      Ted Paula MD  [address]  [facility] [address]    Patient: Herminio Burger   MR Number: [id number]   YOB: 1945   Date of Visit: 3/21/2018     Dear Dr. Paula:    Thank you for referring Herminio Burger to me for evaluation. He presents with a cavitary left upper lobe lung nodule identified on a low dose chest CT.  He is a former 50 pack/year smoker and the left upper lobe nodule is new in comparison to a 2014 chest CT.    I discussed the option of a contrast enhanced chest CT followed by PET scan with Mr. Burger and his wife.  The lesion location is not amenable to biopsy.  Mr. Burger would like to wait and undergo a repeat chest CT.  I think that a repeat chest CT in 3 months will suffice.  I informed him that by waiting he risks progression of the mass into the wall of his aorta if it is indeed malignant.     If you have questions, please do not hesitate to call me. I look forward to following Herminio along with you.    Sincerely,      Kaushik Christina MD   Section of Thoracic Surgery  [facility] - [place], [place]    BLP/hcr

## 2018-03-21 NOTE — LETTER
March 21, 2018      Ted Paula MD  [address]  [facility] [address]           [facility] - Thoracic Surgery  [address]  [facility] [address]  Phone: [phone]  Fax: [phone]          Patient: Herminio Burger   MR Number: [id number]   YOB: 1945   Date of Visit: 3/21/2018       Dear Dr. Ted Paula:    Thank you for referring Herminio Burger to me for evaluation. Attached you will find relevant portions of my assessment and plan of care.    If you have questions, please do not hesitate to call me. I look forward to following Herminio Burger along with you.    Sincerely,    Kaushik Christina MD    Enclosure  CC:  No Recipients    If you would like to receive this communication electronically, please contact [email or web address] or [phone] to request more information on Create Link access.    For providers and/or their staff who would like to refer a patient to [facility], please contact us through our one-stop-shop provider referral line, [facility], at [phone].    If you feel you have received this communication in error or would no longer like to receive these types of communications, please e-mail [email or web address]

## 2018-03-21 NOTE — PROGRESS NOTES
History & Physical    SUBJECTIVE:     History of Present Illness:   72 y.o. male presents with PMH of HTN, COPD, spontaneous pneumothorax and colon perforation presents for evaluation of newly diagnosed lung lesion abutting aorta. Patient has a complicated surgical history starting in February 2014 when he presented to ED with spontaneous pneumothorax. Underwent a right VATS on 2/27/14 for apical segmentectomy/blebectomy. Hospital course complicated by spontaneous colon perforation requiring lapaorotomy for ileostomy, wound vac placement, and redo laparotomy for closure of ileostomy, cholecystectomy and repair of ventral hernia in November 2014. However, since then he's been doing very well. Recently saw his PCP for URI and got a low dose lung cancer screening chest CT which revealed a 4cm solid and ground glass mass that abuts the aorta.   Today he reports feeling well. States cough and congestion have resolved.  Denies difficulty with daily activities. Denies fever, chills, SOB, weight loss, cough, decreased appetite, N/V or changes in bowel and bladder functioning.     Former smoker. Quit in 2014. 50 pack year history. Denies EtOH use.     Chief Complaint   Patient presents with    Consult       Review of patient's allergies indicates:  No Known Allergies    Current Outpatient Prescriptions   Medication Sig Dispense Refill    albuterol 90 mcg/actuation inhaler Inhale 2 puffs into the lungs every 6 (six) hours as needed for Wheezing. 18 g 11    diphenhydrAMINE (BENADRYL) 25 mg capsule Take 1 each (25 mg total) by mouth every 6 (six) hours as needed for Itching. 30 capsule 1    hydrocodone-acetaminophen 5-325mg (NORCO) 5-325 mg per tablet Take 1 tablet by mouth every 8 (eight) hours as needed for Pain. 21 tablet 0    losartan (COZAAR) 100 MG tablet Take 1 tablet (100 mg total) by mouth once daily. Brand name only please 90 tablet 11    metoprolol tartrate (LOPRESSOR) 25 MG tablet TAKE 1 TABLET(25 MG) BY MOUTH  TWICE DAILY 180 tablet 0    triamcinolone acetonide 0.1% (KENALOG) 0.1 % ointment YEIMY TO RASH ON BODY BID PRN FOR ITCHING AND FLARES  1    vitamin E 1000 UNIT capsule Take 1,000 Units by mouth once daily.       No current facility-administered medications for this visit.        Past Medical History:   Diagnosis Date    Asthma     Colon necrosis     ischemia    Colon polyps     COPD (chronic obstructive pulmonary disease)     DVT (deep venous thrombosis)     right arm resolved    Elevated liver enzymes     Emphysema     Gallstones     Herniated disc     Hypertension     Ileostomy in place     Ischemic colitis     Screening for colon cancer      Past Surgical History:   Procedure Laterality Date    ABDOMINAL REEXPLORATION      ABDOMINAL SURGERY      CHOLECYSTECTOMY      COLONOSCOPY W/ POLYPECTOMY      COLOSTOMY      GASTROSTOMY TUBE PLACEMENT      scalp lac       1970    SHOULDER ARTHROSCOPY      TONSILLECTOMY      VATS Right      Family History   Problem Relation Age of Onset    Heart disease Mother     Diabetes Mother     Cancer Father     No Known Problems Sister     Cancer Brother      prostate     Nephrotic syndrome Daughter     Cancer Sister      gallbladder    Stroke Brother     Cancer Brother      liver, kidney, prostate    Congenital heart disease Brother     Cancer Daughter      breast    Anxiety disorder Daughter      Social History   Substance Use Topics    Smoking status: Former Smoker     Packs/day: 1.00     Years: 50.00     Types: Cigarettes     Quit date: 2/6/2014    Smokeless tobacco: Not on file    Alcohol use 0.0 oz/week      Comment: on occasion        Review of Systems:    Review of Systems - General ROS: positive for  - fatigue  Hematological and Lymphatic ROS: negative  Respiratory ROS: no cough, shortness of breath, or wheezing  Cardiovascular ROS: no chest pain or dyspnea on exertion  Gastrointestinal ROS: no abdominal pain, change in bowel habits, or  "black or bloody stools  Musculoskeletal ROS: negative  Neurological ROS: no TIA or stroke symptoms  negative    OBJECTIVE:     Vital Signs (Most Recent)  Pulse: 77 (03/21/18 1058)  BP: (!) 159/90 (03/21/18 1058)  SpO2: 96 % (03/21/18 1058)  5' 11" (1.803 m)  78.8 kg (173 lb 11.6 oz)     Physical Exam:    Constitutional:   Vital signs are normal. He appears well-developed.     Head:  Normocephalic and atraumatic.     Cardiovascular:   Normal rate and regular rhythm.      Pulmonary/Chest:   Effort normal and breath sounds normal.     Psychiatric:   He has a normal mood and affect.     Diagnostic Results:    3/8/18- Chest CT-   Solid lesion with groundglass halo and central lucency abutting the aorta and measuring approximately 4.0 x 2.1 x 3.9 cm overall (solid component measures 2.7 x 1.4 x 1.8 cm - axial series 3 image 139 through 190, coronal series 604 image 194).  Appearance is concerning for malignancy.  We recommend prompt consultation with pulmonary medicine.  Multiple, scattered, solid nodules throughout bilateral lungs as described above.  Mild calcific atherosclerosis of the aorta.    ASSESSMENT/PLAN:      72 y.o. male presents with PMH of HTN, COPD, spontaneous pneumothorax and colon perforation presents for evaluation of newly diagnosed lung lesion abutting aorta.    PLAN:Plan   Long discussion with patient and his wife today regarding recommendations for further imaging as this mass is concerning for malignancy. At this point, it is unable to be safely biopsied. We recommended PET and chest CT with contrast to better understand the relationship between mass and the aorta. However, patient would like to wait on further imaging.  Will order repeat noncontrast chest CT in 3 months.       ATTENDING ATTESTATION:    I evaluated the patient and I agree with the assessment and plan.  The patient would like to undergo a repeat chest CT.  Given the proximity to the aortic arch, I recommend a repeat chest CT in 3 " months.  If there is a size increase, a contrast-enhanced CT will be necessary and consideration given to resection vs SBRT.  If the cavitary nodule remains stable or regresses, continued observation is warranted.

## 2018-04-02 ENCOUNTER — PES CALL (OUTPATIENT)
Dept: ADMINISTRATIVE | Facility: CLINIC | Age: 73
End: 2018-04-02

## 2018-04-05 ENCOUNTER — TELEPHONE (OUTPATIENT)
Dept: INTERNAL MEDICINE | Facility: CLINIC | Age: 73
End: 2018-04-05

## 2018-04-05 NOTE — TELEPHONE ENCOUNTER
----- Message from Susana Davidson sent at 4/5/2018 11:30 AM CDT -----  Contact: Self [phone]  Patient is requesting a call back from the nurse to reschedule his BP nurse visit appointment.    Patient may be reached at [phone].    Thank you.  LC

## 2018-04-06 NOTE — TELEPHONE ENCOUNTER
----- Message from Pam Maxwell sent at 4/5/2018  2:18 PM CDT -----  Contact: self/[phone]      Patient is returning a phone call.  Who left a message for the patient: Stephanie  Does patient know what this is regarding:  no  Comments: thank you!!!

## 2018-04-11 ENCOUNTER — TELEPHONE (OUTPATIENT)
Dept: INTERNAL MEDICINE | Facility: CLINIC | Age: 73
End: 2018-04-11

## 2018-04-11 ENCOUNTER — CLINICAL SUPPORT (OUTPATIENT)
Dept: INTERNAL MEDICINE | Facility: CLINIC | Age: 73
End: 2018-04-11
Payer: MEDICARE

## 2018-04-11 DIAGNOSIS — Z01.30 BLOOD PRESSURE CHECK: Primary | ICD-10-CM

## 2018-04-11 NOTE — PROGRESS NOTES
Pt is here for b/p check. Manual blood pressure in the office 150/90, pulse 78. Pt denies dizziness, headache, fatigue. Pt states that he's been feeling well. States that he takes b/p at home from time to time and reports that last week his b/p has been running 130's over 70's. Pt is taking Losartan 100 mg one po qd and Metoprolol Tartrate 25 mg one po bid. Pt's next follow up appt is on 6/6/2018, pt will be bringing his b/p readings then.

## 2018-06-06 ENCOUNTER — OFFICE VISIT (OUTPATIENT)
Dept: INTERNAL MEDICINE | Facility: CLINIC | Age: 73
End: 2018-06-06
Payer: MEDICARE

## 2018-06-06 VITALS
SYSTOLIC BLOOD PRESSURE: 128 MMHG | DIASTOLIC BLOOD PRESSURE: 80 MMHG | HEIGHT: 71 IN | HEART RATE: 80 BPM | OXYGEN SATURATION: 97 % | BODY MASS INDEX: 24.17 KG/M2 | WEIGHT: 172.63 LBS

## 2018-06-06 VITALS
SYSTOLIC BLOOD PRESSURE: 128 MMHG | HEART RATE: 80 BPM | DIASTOLIC BLOOD PRESSURE: 80 MMHG | WEIGHT: 172.63 LBS | BODY MASS INDEX: 24.08 KG/M2

## 2018-06-06 DIAGNOSIS — I10 ESSENTIAL HYPERTENSION: ICD-10-CM

## 2018-06-06 DIAGNOSIS — M47.812 CERVICAL SPINE ARTHRITIS: ICD-10-CM

## 2018-06-06 DIAGNOSIS — J44.9 CHRONIC OBSTRUCTIVE PULMONARY DISEASE, UNSPECIFIED COPD TYPE: ICD-10-CM

## 2018-06-06 DIAGNOSIS — R91.1 LUNG NODULE: ICD-10-CM

## 2018-06-06 DIAGNOSIS — Z00.00 ENCOUNTER FOR PREVENTIVE HEALTH EXAMINATION: Primary | ICD-10-CM

## 2018-06-06 DIAGNOSIS — I70.0 ATHEROSCLEROSIS OF AORTA: ICD-10-CM

## 2018-06-06 DIAGNOSIS — H69.92 EUSTACHIAN TUBE DYSFUNCTION, LEFT: ICD-10-CM

## 2018-06-06 DIAGNOSIS — M47.812 ARTHRITIS OF NECK: Primary | ICD-10-CM

## 2018-06-06 DIAGNOSIS — I77.9 BILATERAL CAROTID ARTERY DISEASE: ICD-10-CM

## 2018-06-06 PROCEDURE — 99499 UNLISTED E&M SERVICE: CPT | Mod: S$GLB,,, | Performed by: NURSE PRACTITIONER

## 2018-06-06 PROCEDURE — 99999 PR PBB SHADOW E&M-EST. PATIENT-LVL IV: CPT | Mod: PBBFAC,,, | Performed by: NURSE PRACTITIONER

## 2018-06-06 PROCEDURE — 99499 UNLISTED E&M SERVICE: CPT | Mod: S$GLB,,, | Performed by: INTERNAL MEDICINE

## 2018-06-06 PROCEDURE — 3074F SYST BP LT 130 MM HG: CPT | Mod: CPTII,S$GLB,, | Performed by: INTERNAL MEDICINE

## 2018-06-06 PROCEDURE — 3079F DIAST BP 80-89 MM HG: CPT | Mod: CPTII,S$GLB,, | Performed by: NURSE PRACTITIONER

## 2018-06-06 PROCEDURE — 3079F DIAST BP 80-89 MM HG: CPT | Mod: CPTII,S$GLB,, | Performed by: INTERNAL MEDICINE

## 2018-06-06 PROCEDURE — 99999 PR PBB SHADOW E&M-EST. PATIENT-LVL III: CPT | Mod: PBBFAC,,, | Performed by: INTERNAL MEDICINE

## 2018-06-06 PROCEDURE — 99214 OFFICE O/P EST MOD 30 MIN: CPT | Mod: S$GLB,,, | Performed by: INTERNAL MEDICINE

## 2018-06-06 PROCEDURE — 3074F SYST BP LT 130 MM HG: CPT | Mod: CPTII,S$GLB,, | Performed by: NURSE PRACTITIONER

## 2018-06-06 PROCEDURE — G0439 PPPS, SUBSEQ VISIT: HCPCS | Mod: S$GLB,,, | Performed by: NURSE PRACTITIONER

## 2018-06-06 RX ORDER — IBUPROFEN 200 MG
200 TABLET ORAL 2 TIMES DAILY PRN
COMMUNITY

## 2018-06-06 RX ORDER — LOSARTAN POTASSIUM 100 MG/1
100 TABLET ORAL DAILY
Qty: 90 TABLET | Refills: 11 | Status: SHIPPED | OUTPATIENT
Start: 2018-06-06 | End: 2018-06-21 | Stop reason: SDUPTHER

## 2018-06-06 RX ORDER — DIPHENHYDRAMINE HCL 50 MG/1
CAPSULE ORAL
Refills: 1 | COMMUNITY
Start: 2018-05-16 | End: 2019-04-03 | Stop reason: CLARIF

## 2018-06-06 RX ORDER — HYDROCODONE BITARTRATE AND ACETAMINOPHEN 5; 325 MG/1; MG/1
1 TABLET ORAL EVERY 8 HOURS PRN
Qty: 21 TABLET | Refills: 0 | Status: SHIPPED | OUTPATIENT
Start: 2018-06-06 | End: 2018-11-19 | Stop reason: SDUPTHER

## 2018-06-06 RX ORDER — FLUTICASONE PROPIONATE 50 MCG
2 SPRAY, SUSPENSION (ML) NASAL DAILY
Qty: 16 G | Refills: 1 | Status: SHIPPED | OUTPATIENT
Start: 2018-06-06 | End: 2018-07-06

## 2018-06-06 NOTE — PATIENT INSTRUCTIONS
Counseling and Referral of Other Preventative  (Italic type indicates deductible and co-insurance are waived)    Patient Name: Herminio Burger  Today's Date: 6/6/2018    Health Maintenance       Date Due Completion Date    High Dose Statin 12/24/1964 -per Dr. Paula    Zoster Vaccine 06/08/2018 (Originally 12/24/2003) Consider new shingles vaccine - Shingrix    TETANUS VACCINE 06/08/2018 (Originally 12/24/1961) Need to obtain    Influenza Vaccine 08/01/2018 3/6/2018    Lipid Panel 03/06/2023 3/6/2018    Colonoscopy 10/06/2024 10/6/2014        No orders of the defined types were placed in this encounter.    The following information is provided to all patients.  This information is to help you find resources for any of the problems found today that may be affecting your health:                Living healthy guide: www.[facility].[place].gov      Understanding Diabetes: www.diabetes.org      Eating healthy: www.cdc.gov/healthyweight      [facility] home safety checklist: www.cdc.gov/steadi/patient.html      Agency on Aging: www.goea.[place].gov      Alcoholics anonymous (AA): www.aa.org      Physical Activity: www.[place].nih.gov/lq0adti      Tobacco use: www.quitwithusla.org

## 2018-06-06 NOTE — PROGRESS NOTES
Subjective:       Patient ID: Herminio Burger is a 72 y.o. male.    Chief Complaint: Follow-up (left side ear clogged, cant hear too good since 6/3)    went deaf in L ear 3 days ago, feels congested in his head as well. Some ringing as well L ear.    1 mo out of metoprolol wonders if he really needs it.    Some neck and shoulder pains, occ hydrocodone use.    Staying active.    No more coughing up any blood, no f/c/ns. No wt loss.    Saw thoracic re chest masses seen, L side. Will have f/u in a few wks.      Review of Systems   Constitutional: Negative for activity change, fever and unexpected weight change.   HENT: Positive for congestion and hearing loss (L ear).    Respiratory: Positive for cough (chronic) and wheezing. Negative for chest tightness.    Cardiovascular: Negative for chest pain (muscular with cough), palpitations and leg swelling.   Gastrointestinal: Positive for constipation. Negative for abdominal distention, abdominal pain, nausea and vomiting.   Genitourinary: Negative for decreased urine volume and difficulty urinating.   Musculoskeletal: Positive for arthralgias (neck/shoulders). Negative for joint swelling.   Skin: Negative for rash and wound.   Neurological: Negative for tremors, syncope and weakness.   Psychiatric/Behavioral: Negative for confusion.       Objective:      Physical Exam   Constitutional: He is oriented to person, place, and time. He appears well-developed and well-nourished. No distress.   HENT:   Head: Normocephalic and atraumatic.   Mouth/Throat: No oropharyngeal exudate.   TMs clear, sinuses nontender, nasal mucosa w/o purulence.   Eyes: EOM are normal. Pupils are equal, round, and reactive to light. No scleral icterus.   Neck: Normal range of motion. Neck supple. No thyromegaly present.   Cardiovascular: Normal rate, regular rhythm and normal heart sounds.  Exam reveals no gallop and no friction rub.    No murmur heard.  Pulmonary/Chest: Effort normal. No respiratory  distress. He has wheezes. He has no rales.   Moderate wheeze   Abdominal: Soft. Bowel sounds are normal. He exhibits no distension and no mass. There is no tenderness. There is no rebound and no guarding.   Multiple abd scars   Musculoskeletal: Normal range of motion. He exhibits no edema.   Neck decreased rom and painful rom linda to the L.  nl upper extrem strength/sense    Shoulders normal rom   Lymphadenopathy:     He has no cervical adenopathy.   Neurological: He is alert and oriented to person, place, and time.   Skin: No lesion noted. He is not diaphoretic.   Psychiatric: He has a normal mood and affect. His behavior is normal. Thought content normal.       Assessment:       1. Arthritis of neck    2. Essential hypertension    3. Chronic obstructive pulmonary disease, unspecified COPD type    4. Eustachian tube dysfunction, left        Plan:         Herminio was seen today for follow-up.    Diagnoses and all orders for this visit:    Arthritis of neck  -     HYDROcodone-acetaminophen (NORCO) 5-325 mg per tablet; Take 1 tablet by mouth every 8 (eight) hours as needed for Pain.  Will take prn, occ    Essential hypertension  -     losartan (COZAAR) 100 MG tablet; Take 1 tablet (100 mg total) by mouth once daily. Brand name only please  controlled    Chronic obstructive pulmonary disease, unspecified COPD type  No longer coughing up blood, has f/u for lung mass    Eustachian tube dysfunction, left  -     fluticasone (FLONASE) 50 mcg/actuation nasal spray; 2 sprays (100 mcg total) by Each Nare route once daily.  No wax on exam.  Explained that if not better in 1-2 weeks, pt should rtc/call PCP, then see ent (declines for now)          Health Maintenance       Date Due Completion Date    High Dose Statin 12/24/1964 ---    Zoster Vaccine 06/08/2018 (Originally 12/24/2003) ---    TETANUS VACCINE 06/08/2018 (Originally 12/24/1961) ---    Influenza Vaccine 08/01/2018 3/6/2018    Lipid Panel 03/06/2023 3/6/2018     Colonoscopy 10/06/2024 10/6/2014      Declines statin      Follow-up in about 6 months (around 12/6/2018).

## 2018-06-06 NOTE — PROGRESS NOTES
Herminio Burger presented for a  Medicare AWV and comprehensive Health Risk Assessment today. The following components were reviewed and updated:    · Medical history  · Family History  · Social history  · Allergies and Current Medications  · Health Risk Assessment  · Health Maintenance  · Care Team     ** See Completed Assessments for Annual Wellness Visit within the encounter summary.**       The following assessments were completed:  · Living Situation  · CAGE  · Depression Screening  · Timed Get Up and Go  · Whisper Test  · Cognitive Function Screening  ·   ·   ·   · Nutrition Screening  · ADL Screening  · PAQ Screening    Vitals:    06/06/18 1308   BP: 128/80   BP Location: Right arm   Pulse: 80   Weight: 78.3 kg (172 lb 9.9 oz)     Body mass index is 24.08 kg/m².  Physical Exam   Constitutional: He is oriented to person, place, and time. He appears well-developed and well-nourished.   Musculoskeletal: Normal range of motion.   Neurological: He is alert and oriented to person, place, and time.   Skin: Skin is warm and dry.   Psychiatric: He has a normal mood and affect.   Nursing note and vitals reviewed.        Diagnoses and health risks identified today and associated recommendations/orders:    1. Encounter for preventive health examination  Here for Health Risk Assessment/Annual Wellness Visit.  Follow up in one year.    2. Essential hypertension  Chronic, stable on current medication. Followed by PCP.    3. Atherosclerosis of aorta  Chronic, stable. Noted CT abdomen/pelvis 5/23/16. . Followed by PCP.    4. Bilateral carotid artery disease  Chronic, stable. Mild irregularity suggesting atherosclerosis proximal carotid noted on MRA 3/11/09. Followed by PCP.    5. Chronic obstructive pulmonary disease, unspecified COPD type  Chronic, stable on current PRN medication. Followed by PCP.    6. Lung nodule  Chronic. Followed by PCP, Cardiothoracic Surgery.    7. Cervical spine arthritis  Chronic, stable on current  medications. Noted Cervical XR 1/06/18. Followed by PCP.      Provided Herminio with a 5-10 year written screening schedule and personal prevention plan. Recommendations were developed using the USPSTF age appropriate recommendations. Education, counseling, and referrals were provided as needed. After Visit Summary printed and given to patient which includes a list of additional screenings\tests needed.    Follow-up in about 9 months (around 3/6/2019).with PCP    Tawnya Bhandari NP

## 2018-06-20 ENCOUNTER — TELEPHONE (OUTPATIENT)
Dept: INTERNAL MEDICINE | Facility: CLINIC | Age: 73
End: 2018-06-20

## 2018-06-20 ENCOUNTER — HOSPITAL ENCOUNTER (OUTPATIENT)
Dept: RADIOLOGY | Facility: HOSPITAL | Age: 73
Discharge: HOME | End: 2018-06-20
Attending: PHYSICIAN ASSISTANT
Payer: MEDICARE

## 2018-06-20 ENCOUNTER — OFFICE VISIT (OUTPATIENT)
Dept: CARDIOTHORACIC SURGERY | Facility: CLINIC | Age: 73
End: 2018-06-20
Attending: PHYSICIAN ASSISTANT
Payer: MEDICARE

## 2018-06-20 ENCOUNTER — HOSPITAL ENCOUNTER (OUTPATIENT)
Dept: PULMONOLOGY | Facility: CLINIC | Age: 73
Discharge: HOME | End: 2018-06-20
Payer: MEDICARE

## 2018-06-20 ENCOUNTER — DOCUMENTATION ONLY (OUTPATIENT)
Dept: CARDIOTHORACIC SURGERY | Facility: HOSPITAL | Age: 73
End: 2018-06-20

## 2018-06-20 VITALS
HEIGHT: 71 IN | SYSTOLIC BLOOD PRESSURE: 159 MMHG | BODY MASS INDEX: 23.98 KG/M2 | DIASTOLIC BLOOD PRESSURE: 89 MMHG | OXYGEN SATURATION: 98 % | WEIGHT: 171.31 LBS | HEART RATE: 66 BPM

## 2018-06-20 DIAGNOSIS — R91.1 LUNG NODULE: ICD-10-CM

## 2018-06-20 DIAGNOSIS — H91.92 ACUTE HEARING LOSS, LEFT: Primary | ICD-10-CM

## 2018-06-20 DIAGNOSIS — R91.1 LUNG NODULE: Primary | ICD-10-CM

## 2018-06-20 DIAGNOSIS — J43.1 PANLOBULAR EMPHYSEMA: ICD-10-CM

## 2018-06-20 LAB
CREAT SERPL-MCNC: 1.1 MG/DL (ref 0.5–1.4)
POST FEV1 FVC: 0.62
POST FEV1: 2.15
POST FVC: 3.48
PRE FEV1 FVC: 59
PRE FEV1: 1.73
PRE FVC: 2.95
PREDICTED FEV1 FVC: 78
PREDICTED FEV1: 3.33
PREDICTED FVC: 4.22
SAMPLE: NORMAL

## 2018-06-20 PROCEDURE — 94010 BREATHING CAPACITY TEST: CPT | Mod: S$GLB,,, | Performed by: INTERNAL MEDICINE

## 2018-06-20 PROCEDURE — A9552 F18 FDG: HCPCS

## 2018-06-20 PROCEDURE — 94727 GAS DIL/WSHOT DETER LNG VOL: CPT | Mod: S$GLB,,, | Performed by: INTERNAL MEDICINE

## 2018-06-20 PROCEDURE — 25500020 PHARM REV CODE 255: Performed by: PHYSICIAN ASSISTANT

## 2018-06-20 PROCEDURE — 3077F SYST BP >= 140 MM HG: CPT | Mod: CPTII,S$GLB,, | Performed by: THORACIC SURGERY (CARDIOTHORACIC VASCULAR SURGERY)

## 2018-06-20 PROCEDURE — 94729 DIFFUSING CAPACITY: CPT | Mod: S$GLB,,, | Performed by: INTERNAL MEDICINE

## 2018-06-20 PROCEDURE — 78815 PET IMAGE W/CT SKULL-THIGH: CPT | Mod: TC

## 2018-06-20 PROCEDURE — 99213 OFFICE O/P EST LOW 20 MIN: CPT | Mod: S$GLB,,, | Performed by: THORACIC SURGERY (CARDIOTHORACIC VASCULAR SURGERY)

## 2018-06-20 PROCEDURE — 3079F DIAST BP 80-89 MM HG: CPT | Mod: CPTII,S$GLB,, | Performed by: THORACIC SURGERY (CARDIOTHORACIC VASCULAR SURGERY)

## 2018-06-20 PROCEDURE — 71260 CT THORAX DX C+: CPT | Mod: TC

## 2018-06-20 PROCEDURE — 71260 CT THORAX DX C+: CPT | Mod: 26,,, | Performed by: RADIOLOGY

## 2018-06-20 PROCEDURE — 78815 PET IMAGE W/CT SKULL-THIGH: CPT | Mod: 26,PI,, | Performed by: RADIOLOGY

## 2018-06-20 PROCEDURE — 99999 PR PBB SHADOW E&M-EST. PATIENT-LVL III: CPT | Mod: PBBFAC,,, | Performed by: THORACIC SURGERY (CARDIOTHORACIC VASCULAR SURGERY)

## 2018-06-20 RX ADMIN — IOHEXOL 75 ML: 350 INJECTION, SOLUTION INTRAVENOUS at 08:06

## 2018-06-20 NOTE — TELEPHONE ENCOUNTER
Hi, here is the ENT referral, try to make this urgent if possible (since he has new onset hearing loss).  I could add him on for tomorrow at 1140 to see if I can drain the abscess.  Let me know if patient has any questions.  Thank you, Ted Paula

## 2018-06-20 NOTE — LETTER
June 20, 2018      ARLENE Medina  [address] [facility] [address]           [facility]  [address]  [place] [address]  Phone: [phone]  Fax: [phone]          Patient: Herminio Burger   MR Number: [id number]   YOB: 1945   Date of Visit: 6/20/2018       Dear Maty Devine:    Thank you for referring Herminio Burger to me for evaluation. Attached you will find relevant portions of my assessment and plan of care.    If you have questions, please do not hesitate to call me. I look forward to following Herminio Burger along with you.    Sincerely,    Kaushik Christina MD    Enclosure  CC:  No Recipients    If you would like to receive this communication electronically, please contact [email or web address] or [phone] to request more information on [employer] access.    For providers and/or their staff who would like to refer a patient to [facility], please contact us through our one-stop-shop provider referral line, [facility], at [phone].    If you feel you have received this communication in error or would no longer like to receive these types of communications, please e-mail [email or web address]

## 2018-06-20 NOTE — PATIENT CARE CONFERENCE
[facility]      THORACIC MULTIDISCIPLINARY TUMOR BOARD  PATIENT REVIEW FORM  ________________________________________________________________________    CLINIC #: [id number]  DATE: 06/20/2018    TUMOR SITE: Left Upper Lobe Mass     PRESENTER: Dr. Christina     PATIENT SUMMARY:   72 y.o. male former smoker presents with PMH of HTN, COPD, spontaneous pneumothorax and colon perforation returns for 3 month follow up to assess lung lesion abutting aorta. In March, he saw his PCP for URI and got a low dose lung cancer screening chest CT which revealed a solid and ground glass mass that abuts the aorta. During last visit, it was determined nodule is too high risk for biopsy thus short interval surveillance was recommended.   PFTS are adequate for sublobar resection. Chest CT performed today shows 1.6 x 1.2 x 1.4 cm spiculated solid lesion re-identified abutting the aorta in the medial aspect of the suprahilar left upper lobe.  Medial border of the lesion is difficult to define and may extend along the aortic isthmus making the max dimension 3.1 cm. Lesion has SUV max of 15.1.    BOARD RECOMMENDATIONS: Reviewed most recent CT and PET imaging with radiology. It does not appear we'd have a safe plane between tumor and aorta for sub-lobar resection. Recommend radiation oncology referral for SBRT to lesion.     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         (i.e., NCCN, NCI, PD, ACO, AUA, etc.)    PRESENTATION AT CANCER CONFERENCE:         [] Prospective    [] Retrospective     [] Follow-Up          [] Eligible for clinical trial

## 2018-06-20 NOTE — TELEPHONE ENCOUNTER
Please see previous note, thank you. Patient would like referral placed, please advise. Also wants abscess on outer thigh removed?

## 2018-06-20 NOTE — PROGRESS NOTES
Subjective:       Patient ID: Herminio Burger is a 72 y.o. male.    Chief Complaint: Follow-up    HPI   72 y.o. Male presents with PMH of HTN, COPD, spontaneous pneumothorax and colon perforation returns for 3 month follow up to assess lung lesion abutting aorta. In March, he saw his PCP for URI and got a low dose lung cancer screening chest CT which revealed a solid and ground glass mass that abuts the aorta. During last visit, it was determined nodule is too high risk for biopsy thus short interval surveillance was recommended. Patient has a complicated surgical history starting in February 2014 when he presented to ED with spontaneous pneumothorax. Underwent a right VATS on 2/27/14 for apical segmentectomy/blebectomy. Hospital course complicated by spontaneous colon perforation requiring lapaorotomy for ileostomy, wound vac placement, and redo laparotomy for closure of ileostomy, cholecystectomy and repair of ventral hernia in November 2014. However, since then he's been doing very well. Today he reports feeling well. States chronic cough and congestion are unchanged.  Denies difficulty with daily activities. Denies fever, chills, SOB, weight loss, cough, decreased appetite, N/V or changes in bowel and bladder functioning.      Former smoker. Quit in 2014. 50 pack year history. Denies EtOH use.     Review of Systems   Constitutional: Negative for activity change, appetite change, fatigue, fever and unexpected weight change.   HENT: Positive for congestion. Negative for trouble swallowing and voice change.    Eyes: Negative for visual disturbance.   Respiratory: Positive for cough (Clear sputum). Negative for chest tightness, shortness of breath and wheezing.    Gastrointestinal: Negative for abdominal distention, diarrhea, nausea and vomiting.   Genitourinary: Negative for difficulty urinating.   Musculoskeletal: Negative for arthralgias.   Neurological: Negative for dizziness, speech difficulty, weakness and  "light-headedness.         Objective:       Vitals:    06/20/18 0824   BP: (!) 159/89   Pulse: 66   SpO2: 98%   Weight: 77.7 kg (171 lb 4.8 oz)   Height: 5' 11" (1.803 m)   PainSc: 0-No pain     Physical Exam   Constitutional: He is oriented to person, place, and time. He appears well-developed and well-nourished.   HENT:   Head: Normocephalic and atraumatic.   Mouth/Throat: Oropharynx is clear and moist.   Eyes: Pupils are equal, round, and reactive to light.   Neck: Normal range of motion. Neck supple.   Cardiovascular: Normal rate, regular rhythm, normal heart sounds and intact distal pulses.    Pulmonary/Chest: He has decreased breath sounds in the right lower field and the left lower field. He has no wheezes. He has no rhonchi.   Abdominal: Soft. Bowel sounds are normal. He exhibits no distension.   Musculoskeletal: Normal range of motion. He exhibits no edema.   Lymphadenopathy:     He has no cervical adenopathy.   Neurological: He is alert and oriented to person, place, and time.   Skin: Skin is warm. He is not diaphoretic.   Psychiatric: He has a normal mood and affect.       6/20/18- Chest CT- Reviewed. Left lung nodule abutting aorta is more solid and concerning for malignancy.     Assessment:         72 y.o. Male presents with PMH of HTN, COPD, spontaneous pneumothorax and colon perforation returns for 3 month follow up to assess lung lesion abutting aorta. I    Plan:       Present at multidisciplinary thoracic tumor board today.   Will get PFTS to assess pulmonary function. If PFTS acceptable for single lung ventilation, we will perform a left robotic exploration. If lung mass adherent to aorta, procedure will be aborted. Otherwise, we'll perform a left upper division with diagnostic and therapeutic intent. RTC next week to discuss potential surgical intervention.    ATTENDING ATTESTATION:    I evaluated the patient and I agree with the assessment and plan.  I will obtain PFTs and assess physiologic " resectability.  The lesion is highly suspicious for a lung malignancy and appears confined to the left upper division.  I have concerns about the relation between the mass and the lateral wall of the thoracic aorta.  If the patient has poor lung function on PFT analysis or if exploratory thoracoscopy reveals invasion into the aortic wall, I will recommend referral to radiation therapy. A PET scan will be needed before radiation therapy will agree to treat the lesion given the lack of a tissue diagnosis.   If the patient proves to be a surgical candidate, I will perform a robotic left upper trisegmentectomy.  I will present the patient's case at our weekly multidisciplinary lung conference.

## 2018-06-20 NOTE — TELEPHONE ENCOUNTER
----- Message from Jake Jamil sent at 6/20/2018  1:28 PM CDT -----  Contact:  [phone] wife's cell/ [phone] cell  Type: Orders Request    What orders/ testing are being requested? ENT    Is there a future appointment scheduled for the patient with PCP? no    When?    Would you prefer a response via [email or web address]?    Comments: Pt was told to make an appt with the ENT dept if the medication didn't work.  He is still having the problems with his ears and would like to request that an order is put in as soon as possible.  Pt would also like to get an appt with Dr. Paula to discuss an abscess on his outer thigh.  Please call and advise.    Thank you

## 2018-06-21 DIAGNOSIS — I10 ESSENTIAL HYPERTENSION: ICD-10-CM

## 2018-06-21 LAB — POCT GLUCOSE: 82 MG/DL (ref 70–110)

## 2018-06-21 RX ORDER — LOSARTAN POTASSIUM 100 MG/1
TABLET ORAL
Qty: 90 TABLET | Refills: 11 | Status: SHIPPED | OUTPATIENT
Start: 2018-06-21 | End: 2019-09-10 | Stop reason: SDUPTHER

## 2018-06-27 ENCOUNTER — OFFICE VISIT (OUTPATIENT)
Dept: CARDIOTHORACIC SURGERY | Facility: CLINIC | Age: 73
End: 2018-06-27
Payer: MEDICARE

## 2018-06-27 VITALS
BODY MASS INDEX: 23.95 KG/M2 | OXYGEN SATURATION: 99 % | DIASTOLIC BLOOD PRESSURE: 80 MMHG | WEIGHT: 171.06 LBS | SYSTOLIC BLOOD PRESSURE: 146 MMHG | HEIGHT: 71 IN | HEART RATE: 69 BPM

## 2018-06-27 DIAGNOSIS — R91.1 PULMONARY NODULE, LEFT: Primary | ICD-10-CM

## 2018-06-27 PROCEDURE — 3077F SYST BP >= 140 MM HG: CPT | Mod: CPTII,S$GLB,, | Performed by: THORACIC SURGERY (CARDIOTHORACIC VASCULAR SURGERY)

## 2018-06-27 PROCEDURE — 99999 PR PBB SHADOW E&M-EST. PATIENT-LVL III: CPT | Mod: PBBFAC,,, | Performed by: THORACIC SURGERY (CARDIOTHORACIC VASCULAR SURGERY)

## 2018-06-27 PROCEDURE — 99213 OFFICE O/P EST LOW 20 MIN: CPT | Mod: S$GLB,,, | Performed by: THORACIC SURGERY (CARDIOTHORACIC VASCULAR SURGERY)

## 2018-06-27 PROCEDURE — 3079F DIAST BP 80-89 MM HG: CPT | Mod: CPTII,S$GLB,, | Performed by: THORACIC SURGERY (CARDIOTHORACIC VASCULAR SURGERY)

## 2018-06-27 NOTE — PROGRESS NOTES
I met with the patient and his wife today to present the multidisciplinary lung conference findings to them.  I informed them that the overwhelming consensus from the meeting was to proceed with SBRT.  The decision was based upon the lack of a plane between the spiculated lung mass and the thoracic aorta as well as the PET avidity.  The patient and his wife expressed an understanding and agreement with the recommendations.  The patient will follow up with Dr. Constantin Dangelo in Radiation Therapy.  We will see him in 6 months with a repeat chest CT.

## 2018-06-29 ENCOUNTER — INITIAL CONSULT (OUTPATIENT)
Dept: RADIATION ONCOLOGY | Facility: CLINIC | Age: 73
End: 2018-06-29
Payer: MEDICARE

## 2018-06-29 VITALS
SYSTOLIC BLOOD PRESSURE: 151 MMHG | WEIGHT: 172 LBS | HEART RATE: 70 BPM | HEIGHT: 71 IN | BODY MASS INDEX: 24.08 KG/M2 | DIASTOLIC BLOOD PRESSURE: 80 MMHG | TEMPERATURE: 98 F | RESPIRATION RATE: 16 BRPM

## 2018-06-29 DIAGNOSIS — Z87.09 HISTORY OF PNEUMOTHORAX: Primary | ICD-10-CM

## 2018-06-29 DIAGNOSIS — R91.1 LUNG NODULE: ICD-10-CM

## 2018-06-29 PROCEDURE — 3079F DIAST BP 80-89 MM HG: CPT | Mod: CPTII,S$GLB,, | Performed by: RADIOLOGY

## 2018-06-29 PROCEDURE — 99999 PR PBB SHADOW E&M-EST. PATIENT-LVL IV: CPT | Mod: PBBFAC,,, | Performed by: RADIOLOGY

## 2018-06-29 PROCEDURE — 99204 OFFICE O/P NEW MOD 45 MIN: CPT | Mod: S$GLB,,, | Performed by: RADIOLOGY

## 2018-06-29 PROCEDURE — 3077F SYST BP >= 140 MM HG: CPT | Mod: CPTII,S$GLB,, | Performed by: RADIOLOGY

## 2018-06-29 RX ORDER — DEXAMETHASONE 4 MG/1
4 TABLET ORAL DAILY
Qty: 10 TABLET | Refills: 0 | Status: SHIPPED | OUTPATIENT
Start: 2018-06-29 | End: 2018-07-09

## 2018-06-29 NOTE — PATIENT INSTRUCTIONS
Radiation Therapy: Managing Short-Term Side Effects     Take short walks daily.   Radiation therapy uses high-energy X-rays or particles to kill cancer cells. Some normal cells can also be affected. This causes side effects such as dry skin, tiredness (fatigue), or changes in your appetite. Most side effects go away when your radiation therapy is over.  Having side effects of radiation therapy does not mean that your cancer is getting worse or that therapy isnt working.   Caring for your skin  Skin problems may happen where your body gets radiation. Your skin may become dry, itchy, red, and peeling. It may darken in that spot, like a tan. To care for your skin:  · Dont scrub on the treatment area. Clean that area of the skin every day. Use warm water and mild soap, or as your healthcare provider advises. Pat the skin afterward or let it air dry.  · Ask your therapy team what lotion to use and when to use it.  · Keep the treated area out of the sun. Ask your team about using a sunscreen.  · Don't remove ink marks unless your radiation therapist says you can. Dont scrub the marks when you wash. Let water run over them and pat them dry.  · Protect your skin from heat or cold. Avoid hot tubs, saunas, hot pads, and ice packs.  · Wear soft, loose clothing to avoid rubbing skin.  Fighting tiredness  The cancer itself or the radiation therapy may cause you to feel tired. Your body is working hard to heal and repair itself. To feel better:  · Try light exercise each day. Take short walks.  · Plan tasks for the times when you tend to have the most energy. Ask for help when you need it.  · Relax before you go to bed to sleep better. Try reading or listening to soothing music.  · Be sure to let your cancer care team know if you continue to have fatigue that is not getting better. They may be able to offer ways to help.   Coping with appetite changes  Tell your therapy team if you find it hard to eat or have no appetite.  You may need to see a nutritionist. This is a healthcare provider with special training in meal planning. To keep your strength up, you need to eat well and maintain your weight. Think of healthy eating as part of your treatment. Try these tips:  · Eat slowly.  · Eat small meals several times a day.  · Eat more food when youre feeling better, even if it is not mealtime.  · Ask others to keep you company when you eat.  · Stock up on easy-to-prepare foods.  · Eat foods high in protein and calories.  · Drink plenty of water and other fluids.  · Ask your healthcare provider before taking any vitamins.  Site-specific side effects  These side effects include the following:   · You may lose hair in the area being treated. The hair often grows back after treatment.  · Your mouth or throat can become dry or sore if your head or neck is being treated. Sip cool water to help ease discomfort.  · Nausea and bowel changes can happen with radiation to the pelvic region. Tell your healthcare provider if you have nausea, diarrhea, or constipation. You may need to take medicine or follow a special diet.  Talk with your healthcare team  Radiation therapy can also have other side effects, including some that might not show up until years later. Be sure to talk with your healthcare team about what to expect with the type of radiation therapy you are getting, including when you should call them with concerns.   Date Last Reviewed: 5/1/2016  © 8166-8958 [employer]. [address] [place] [address]. All rights reserved. This information is not intended as a substitute for professional medical care. Always follow your healthcare professional's instructions.        Radiation Therapy Treatment  Radiation therapy can help you in your fight against cancer. It begins with a session to discuss treatment with your doctor. If you and your doctor decide on radiation, you will return for a simulation. The simulation is a  planning session that helps the doctor target your cancer. He or she will design a radiation plan to protect normal tissues. When the simulation and plan are completed, you will begin your daily treatments. Treatment is usually once daily Monday to Friday. It takes less than a half an hour. Sometimes you may need radiation twice a day, with usually 6 hours between treatments. After the course of radiation is complete, you will be scheduled for follow-up appointments. This is to make sure the cancer is under control. The follow-ups will also make sure that any side effects from the treatment are taken care of.  Radiation therapy uses high-energy X-rays to kill cancer cells.   Your treatment planning visit: The simulation  Your radiation therapy team uses a special machine called a simulator to map out your treatment. The simulator is usually an X-ray machine (fluoroscopy), CT scanner, MRI scanner, or PET-CT scanner machine. Laser lights act as guides to help position your body accurately. During this visit:  · The team figures out the best position for your body. They make notes in your chart so youll be placed the same way each time.  · You may use special devices to keep your body correctly positioned and still during treatment. These may include molds, masks, rests, and blocks.  · The team makes ink marks on your skin. These will help you get in the same position for each treatment. Tiny permanent tattoos may also be used.   · Markers such as metal balls or wires may be put on or in your body. Sometime these are taped to the skin to help with the imaging process. These work with the X-rays to position your body. The markers are removed when the visit is over.  After the team has the imaging and data, the information is sent into the computer planning system. Your doctor and the team of physicists and dosimetrists design a treatment field. The field will best target your cancer and how it might spread. It will  also help limit radiation to nearby normal tissues.  Your treatments  Each treatment usually takes 10 to 30 minutes. You may need to change into a hospital gown. The radiation therapist puts you in the correct position on the treatment table, then leaves the room. Sometimes you may need more imaging before each treatment. The machine may take digital X-rays or a CT scan to help make sure you are lined up correctly. During treatment, lie as still as you can and breathe normally. You will hear noises coming from the machine. You can talk to the radiation therapist, who watches you from the control room on a TV monitor. After treatment, the therapist will help you off the table. You can then get dressed and go back to your normal activities.  Date Last Reviewed: 1/14/2016  © 6745-1242 [employer]. [address] [place] [address]. All rights reserved. This information is not intended as a substitute for professional medical care. Always follow your healthcare professional's instructions.        Discharge Instructions for Radiation Therapy  Radiation therapy uses high-energy X-rays to kill cancer cells and help you in your fight against cancer. Radiation destroys cancer cells gradually, over time. The goal of therapy is to focus on and kill as many cancer cells as possible. Radiation can also damage or kill some of the normal cells that are closest to the tumor. Damaged normal cells can repair themselves, often within a few days.  Caring for your skin  You should ask your healthcare provider for specific products that he or she recommends for washing and bathing. In general, use a mild nondetergent soap and warm (not hot) water to clean the area receiving radiation. Pat the region dry rather than rubbing.  Your healthcare provider may give you products to moisturize the skin and prevent infection. The goal is to prevent cracks or breaks in the skin that may be sensitive from treatment:   · Dont  "be surprised if your treatment causes skin redness, and a type of "sunburn" over time. Some radiation treatments can cause this.   · Ask your therapy team what lotion to use. Also ask for directions about when and how to apply it.  · Avoid prolonged or direct sunlight on the treated area. Ask your therapy team about using a sunscreen. You do not have to avoid going outside altogether, but must take appropriate precautions.  · Dont remove ink marks unless your radiation therapist says its OK. Dont scrub or use soap on the marks when you wash. Let water run over them and pat them dry.  · Protect your skin from heat or cold. Avoid hot tubs, saunas, heating pads, or ice packs.  · Avoid clothing that causes friction or rubbing on the skin.  Fighting fatigue  Radiation therapy may cause you to feel tired. Your body is working hard to heal and repair itself. To feel better, try these things:  · Do light exercise each day. Take short walks.  · Plan tasks for the times when you tend to have the most energy. Ask for help when you need it.  · Relax before you go to bed. This will help you sleep better. Try reading or listening to soothing music.  Coping with appetite changes  Here are ways to cope:  · Tell your therapy team if you find it hard to eat or you have no appetite. You may be referred to a nutritionist to help you with meal planning.  · Radiation to certain internal sites can cause nausea, depending on the location of treatment. This can affect your appetite. Think of healthy eating as part of your treatment. Try these tips:  ¨ Eat slowly.  ¨ Eat small meals several times a day.  ¨ Eat more food when youre feeling better.  ¨ Ask others to keep you company when you eat.  ¨ Stock up on easy-to-prepare foods.  ¨ Eat foods high in protein and calories. Your healthcare provider may recommend liquid meal supplements.  ¨ Drink plenty of water and other fluids.  ¨ Ask your healthcare provider before taking any vitamins " or over-the-counter supplements. Such products are not regulated by the FDA and can sometimes interfere with your treatments.   Dealing with other side effects  Here are suggestions to deal with other side effects:   · Be prepared for hair loss in the area being treated. The hair loss may be permanent. Be sure to discuss this with your healthcare provider.  · Sip cool water if your mouth or throat becomes dry or sore. Ice chips may also help.  · Tell your healthcare provider if you have diarrhea or constipation. You may be given a special diet.  · If you have trouble swallowing liquids, tell your healthcare provider.  Follow-up  Make a follow-up appointment as directed by your healthcare provider.     When to call your healthcare provider  Call your healthcare provider right away if you have any of the following:  · Unexpected headaches  · Trouble concentrating  · Ongoing fatigue  · Wheezing, shortness of breath, or trouble breathing  · Pain that doesnt go away, especially if its always in the same place  · New or unusual lumps, bumps, or swelling  · Dizziness or lightheadedness  · Unusual rashes, bruises, or bleeding  · Fever of 100.4°F (38°C) or higher, or chills  · Nausea and vomiting  · Diarrhea that doesnt improve with time  · Skin breakdown; significant pain due to skin irritation   Date Last Reviewed: 1/13/2016  © 3547-6944 [employer]. [address] [place] [address], [place] [address]. All rights reserved. This information is not intended as a substitute for professional medical care. Always follow your healthcare professional's instructions.      Return for ct/sim as scheduled.

## 2018-06-29 NOTE — PROGRESS NOTES
REFERRING PHYSICIAN:   Kaushik Christina M.D.    DIAGNOSIS:    Hypermetabolic left upper lobe nodule, T1 N0 M0    HISTORY OF PRESENT ILLNESS:   Mr. Burger is a 72-year-old male with history of hypertension, COPD, spontaneous pneumothorax and colon perforation in 2014, now with left upper lobe nodule found on a CT scan in March 2018 after evaluation for upper respiratory infection.  At that time, the CT revealed a solid lesion with groundglass halo abutting the aorta and measuring approximately 4 x 2.1 x 3.9 cm overall (solid component measures 2.7 x 1.4 x 1.8 cm).  After consultation with Dr. Christina, this was deemed to be difficult to biopsy due to its abutment of the aorta and close monitoring was recommended.  A repeat CT of the chest with contrast on June 20, 2018 again revealed this lesion which was measured at 1.6 x 1.2 x 1.4 cm with possible extension up to 3.1 cm along the aortic isthmus.  A PET scan on the same day revealed this lesion with uptake of 15.1.  No other hypermetabolic areas were noted.  After much discussion in the thoracic tumor board, the recommendation is to treat with focal radiation to this lesion using SBRT technique due to the risk of surgical complications.  He is here today with his wife for discussion regarding that.    At present, he reports chronic cough over the last 20 years without change which he attributes to postnasal drip.  He denies shortness of breath, chest pain, fever, night sweats, or weight loss.  He has history of spontaneous pneumothorax in 2014 which was surgically repaired which was complicated by subsequent colon perforation requiring laparotomy for ileostomy.  He has history of smoking 1 pack a day for 50 years which she quit in 2014.    REVIEW OF SYSTEMS:  As above.  In addition, patient denies headaches, visual problems, dizziness, chest pain, shortness of breath, cough, nausea, vomiting, diarrhea, or any new bony pains. Patient also denies easy bruising, skin  rashes, or numbness or tingling.    ECO    PAST MEDICAL HISTORY:  Past Medical History:   Diagnosis Date    Asthma     Cervical spine arthritis 2018    Colon necrosis     ischemia    Colon polyps     COPD (chronic obstructive pulmonary disease)     DVT (deep venous thrombosis)     right arm resolved    Elevated liver enzymes     Emphysema     Gallstones     Herniated disc     Hypertension     Ileostomy in place     Ischemic colitis     Screening for colon cancer        PAST SURGICAL HISTORY:  Past Surgical History:   Procedure Laterality Date    ABDOMINAL REEXPLORATION      ABDOMINAL SURGERY      CHOLECYSTECTOMY      COLON SURGERY      ilesostomy closure    COLONOSCOPY W/ POLYPECTOMY      COLOSTOMY      GASTROSTOMY TUBE PLACEMENT      scalp lac       1970    SHOULDER ARTHROSCOPY      TONSILLECTOMY      VATS Right        ALLERGIES:   Review of patient's allergies indicates:  No Known Allergies    MEDICATIONS:  Current Outpatient Prescriptions   Medication Sig    BANOPHEN 50 mg capsule TK ONE C PO  BID PRN    diphenhydrAMINE (BENADRYL) 25 mg capsule Take 1 each (25 mg total) by mouth every 6 (six) hours as needed for Itching.    fluticasone (FLONASE) 50 mcg/actuation nasal spray 2 sprays (100 mcg total) by Each Nare route once daily.    HYDROcodone-acetaminophen (NORCO) 5-325 mg per tablet Take 1 tablet by mouth every 8 (eight) hours as needed for Pain.    ibuprofen (ADVIL,MOTRIN) 200 MG tablet Take 200 mg by mouth 2 (two) times daily as needed for Pain.    losartan (COZAAR) 100 MG tablet TAKE 1 TABLET BY MOUTH EVERY DAY    triamcinolone acetonide 0.1% (KENALOG) 0.1 % ointment YEIMY TO RASH ON BODY BID PRN FOR ITCHING AND FLARES    vitamin E 1000 UNIT capsule Take 1,000 Units by mouth once daily.    albuterol 90 mcg/actuation inhaler Inhale 2 puffs into the lungs every 6 (six) hours as needed for Wheezing.     No current facility-administered medications for this visit.   "      SOCIAL HISTORY:  Social History     Social History    Marital status:      Spouse name: N/A    Number of children: N/A    Years of education: N/A     Occupational History    Not on file.     Social History Main Topics    Smoking status: Former Smoker     Packs/day: 1.00     Years: 50.00     Types: Cigarettes     Quit date: 2/6/2014    Smokeless tobacco: Never Used    Alcohol use 0.0 oz/week      Comment: less than one monthly    Drug use: No    Sexual activity: No     Other Topics Concern    Not on file     Social History Narrative    Previous . 3 kids, 51, 49, 48. No exercise.       FAMILY HISTORY:  Family History   Problem Relation Age of Onset    Heart disease Mother     Diabetes Mother     Cancer Father     No Known Problems Sister     Cancer Brother         prostate     Nephrotic syndrome Daughter     Cancer Sister         gallbladder    Stroke Brother     Cancer Brother         liver, kidney, prostate    Congenital heart disease Brother     Cancer Daughter         breast    Anxiety disorder Daughter          PHYSICAL EXAMINATION:  Vitals:    06/29/18 0856   BP: (!) 151/80   Pulse: 70   Resp: 16   Temp: 98 °F (36.7 °C)   TempSrc: Oral   Weight: 78 kg (172 lb)   Height: 5' 11" (1.803 m)   Body mass index is 23.99 kg/m².  GENERAL: Patient is alert and oriented, in no acute distress.  HEENT:Extraocular muscles are intact.  Oropharynx is clear without lesions.  There is no cervical or supraclavicular lymphadenopathy palpated.  No thyromegaly noted.  HEART: Regular rate and rhythm.  LUNGS: Clear to auscultation bilaterally.  ABDOMEN:Soft, nontender, nondistended, without hepatosplenomegaly.  Normoactive bowel sounds.  EXTREMITIES: No clubbing, cyanosis, or edema.  NEUROLOGICAL: Cranial nerve II through XII grossly intact.  Sensation is intact.  Strength is 5 out of 5 in the upper and lower extremities bilaterally.       ASSESSMENT:   This is a 72-year-old male with " history of right pneumothorax requiring surgical repair, now with a hypermetabolic left upper lobe nodule which is at high risk for biopsy or surgical resection due to abutment of the aorta.      PLAN:   After discussion in the multidisciplinary thoracic conference and review of the available images of the CT and PET scans, Mr. Burger is not a surgical candidate based on the location of the lesion.  The biopsy is also difficult.  Given the uptake on PET scan, this is consistent with malignancy.  Therefore, patient is recommended to undergo focal radiation to this lesion using SBR T technique.  I plan to deliver approximately 6000 cGy in 8 fractions.    The risks, benefits, and side effects of radiation were explained in detail to the patient and his wife.  All questions were answered and informed consent was signed.  I plan to see the patient back for radiation planning CT in approximately one to 2 weeks.  He was also given prescription for Decadron 4 mg to be taken one hour before each radiation treatment.    Psychosocial Distress screening score of Distress Score: 0 noted and reviewed. No intervention indicated.    I spent approximately 60 minutes reviewing the available records and evaluating the patient, out of which over 50% of the time was spent face to face with the patient in counseling and coordinating this patient's care.

## 2018-06-29 NOTE — LETTER
June 29, 2018      Kaushik Christina MD  [address]  [facility] [address]           [facility]  [address]  [facility] [address]  Phone: [phone]          Patient: Herminio Burger   MR Number: [id number]   YOB: 1945   Date of Visit: 6/29/2018       Dear Dr. Kaushik Christina:    Thank you for referring Herminio Burger to me for evaluation. Attached you will find relevant portions of my assessment and plan of care.    If you have questions, please do not hesitate to call me. I look forward to following Herminio Burger along with you.    Sincerely,    Audrey Wilkins MD    Enclosure  CC:  No Recipients    If you would like to receive this communication electronically, please contact [email or web address] or [phone] to request more information on [employer] access.    For providers and/or their staff who would like to refer a patient to [facility], please contact us through our one-stop-shop provider referral line, [facility], at [phone].    If you feel you have received this communication in error or would no longer like to receive these types of communications, please e-mail [email or web address]

## 2018-07-05 ENCOUNTER — HOSPITAL ENCOUNTER (OUTPATIENT)
Dept: RADIATION THERAPY | Facility: HOSPITAL | Age: 73
Discharge: HOME | End: 2018-07-05
Attending: RADIOLOGY
Payer: MEDICARE

## 2018-07-05 PROCEDURE — 77334 RADIATION TREATMENT AID(S): CPT | Mod: TC | Performed by: RADIOLOGY

## 2018-07-05 PROCEDURE — 77263 THER RADIOLOGY TX PLNG CPLX: CPT | Mod: ,,, | Performed by: RADIOLOGY

## 2018-07-05 PROCEDURE — 77334 RADIATION TREATMENT AID(S): CPT | Mod: 26,,, | Performed by: RADIOLOGY

## 2018-07-05 PROCEDURE — 77014 HC CT GUIDANCE RADIATION THERAPY FLDS PLACEMENT: CPT | Mod: TC | Performed by: RADIOLOGY

## 2018-07-05 PROCEDURE — 77290 THER RAD SIMULAJ FIELD CPLX: CPT | Mod: 26,,, | Performed by: RADIOLOGY

## 2018-07-05 PROCEDURE — 77290 THER RAD SIMULAJ FIELD CPLX: CPT | Mod: TC | Performed by: RADIOLOGY

## 2018-07-11 PROCEDURE — 77301 RADIOTHERAPY DOSE PLAN IMRT: CPT | Mod: 26,,, | Performed by: RADIOLOGY

## 2018-07-11 PROCEDURE — 77301 RADIOTHERAPY DOSE PLAN IMRT: CPT | Mod: TC | Performed by: RADIOLOGY

## 2018-07-12 ENCOUNTER — CLINICAL SUPPORT (OUTPATIENT)
Dept: AUDIOLOGY | Facility: CLINIC | Age: 73
End: 2018-07-12
Payer: MEDICARE

## 2018-07-12 DIAGNOSIS — H90.3 ASYMMETRICAL SENSORINEURAL HEARING LOSS: Primary | ICD-10-CM

## 2018-07-12 PROCEDURE — 77301 RADIOTHERAPY DOSE PLAN IMRT: CPT | Mod: TC | Performed by: RADIOLOGY

## 2018-07-12 PROCEDURE — 77338 DESIGN MLC DEVICE FOR IMRT: CPT | Mod: TC | Performed by: RADIOLOGY

## 2018-07-12 PROCEDURE — 77293 RESPIRATOR MOTION MGMT SIMUL: CPT | Mod: TC | Performed by: RADIOLOGY

## 2018-07-12 PROCEDURE — 77293 RESPIRATOR MOTION MGMT SIMUL: CPT | Mod: 26,,, | Performed by: RADIOLOGY

## 2018-07-12 PROCEDURE — 77300 RADIATION THERAPY DOSE PLAN: CPT | Mod: TC | Performed by: RADIOLOGY

## 2018-07-12 PROCEDURE — 77338 DESIGN MLC DEVICE FOR IMRT: CPT | Mod: 26,,, | Performed by: RADIOLOGY

## 2018-07-12 PROCEDURE — 92557 COMPREHENSIVE HEARING TEST: CPT | Mod: S$GLB,,, | Performed by: AUDIOLOGIST-HEARING AID FITTER

## 2018-07-12 PROCEDURE — 77301 RADIOTHERAPY DOSE PLAN IMRT: CPT | Mod: 26,,, | Performed by: RADIOLOGY

## 2018-07-12 PROCEDURE — 77300 RADIATION THERAPY DOSE PLAN: CPT | Mod: 26,,, | Performed by: RADIOLOGY

## 2018-07-12 NOTE — PROGRESS NOTES
Herminio Burger was seen in the clinic today for a hearing evaluation. Mr. Burger reported a sudden hearing loss in his left ear about a month ago. He also stated he has bilateral tinnitus.       Audiological testing revealed a mild to moderately severe mid to high frequency sensorineural hearing loss in the right ear and a moderate to moderately severe sensorineural hearing loss in the left ear. A speech reception threshold was obtained at 15 dBHL in the right ear and 55 dBHL in the left ear. Speech discrimination was 84% in the right ear and 40% in the left ear.    Tympanometry was not performed due to the tympanometer being out of order.    Recommendations:  1. Otologic evaluation  2. Annual hearing evaluation  3. Hearing aid consult following medical clearance

## 2018-07-13 ENCOUNTER — DOCUMENTATION ONLY (OUTPATIENT)
Dept: RADIATION ONCOLOGY | Facility: CLINIC | Age: 73
End: 2018-07-13

## 2018-07-13 PROCEDURE — 77280 THER RAD SIMULAJ FIELD SMPL: CPT | Mod: 26,,, | Performed by: RADIOLOGY

## 2018-07-13 PROCEDURE — 77280 THER RAD SIMULAJ FIELD SMPL: CPT | Mod: TC | Performed by: RADIOLOGY

## 2018-07-16 ENCOUNTER — OFFICE VISIT (OUTPATIENT)
Dept: INTERNAL MEDICINE | Facility: CLINIC | Age: 73
End: 2018-07-16
Payer: MEDICARE

## 2018-07-16 VITALS
SYSTOLIC BLOOD PRESSURE: 130 MMHG | BODY MASS INDEX: 24.19 KG/M2 | WEIGHT: 172.81 LBS | DIASTOLIC BLOOD PRESSURE: 70 MMHG | HEIGHT: 71 IN | HEART RATE: 74 BPM | OXYGEN SATURATION: 96 %

## 2018-07-16 DIAGNOSIS — J44.1 COPD EXACERBATION: ICD-10-CM

## 2018-07-16 DIAGNOSIS — R05.9 COUGH: Primary | ICD-10-CM

## 2018-07-16 PROCEDURE — 99999 PR PBB SHADOW E&M-EST. PATIENT-LVL III: CPT | Mod: PBBFAC,,, | Performed by: INTERNAL MEDICINE

## 2018-07-16 PROCEDURE — 99499 UNLISTED E&M SERVICE: CPT | Mod: HCNC,S$GLB,, | Performed by: INTERNAL MEDICINE

## 2018-07-16 PROCEDURE — 77370 RADIATION PHYSICS CONSULT: CPT | Performed by: RADIOLOGY

## 2018-07-16 PROCEDURE — 3078F DIAST BP <80 MM HG: CPT | Mod: CPTII,S$GLB,, | Performed by: INTERNAL MEDICINE

## 2018-07-16 PROCEDURE — 77470 SPECIAL RADIATION TREATMENT: CPT | Mod: 59,TC | Performed by: RADIOLOGY

## 2018-07-16 PROCEDURE — 77338 DESIGN MLC DEVICE FOR IMRT: CPT | Mod: 26,,, | Performed by: RADIOLOGY

## 2018-07-16 PROCEDURE — 77300 RADIATION THERAPY DOSE PLAN: CPT | Mod: 26,,, | Performed by: RADIOLOGY

## 2018-07-16 PROCEDURE — 99214 OFFICE O/P EST MOD 30 MIN: CPT | Mod: S$GLB,,, | Performed by: INTERNAL MEDICINE

## 2018-07-16 PROCEDURE — 77338 DESIGN MLC DEVICE FOR IMRT: CPT | Mod: TC | Performed by: RADIOLOGY

## 2018-07-16 PROCEDURE — 77470 SPECIAL RADIATION TREATMENT: CPT | Mod: 26,59,, | Performed by: RADIOLOGY

## 2018-07-16 PROCEDURE — 3075F SYST BP GE 130 - 139MM HG: CPT | Mod: CPTII,S$GLB,, | Performed by: INTERNAL MEDICINE

## 2018-07-16 PROCEDURE — 77300 RADIATION THERAPY DOSE PLAN: CPT | Mod: TC | Performed by: RADIOLOGY

## 2018-07-16 RX ORDER — CODEINE PHOSPHATE AND GUAIFENESIN 10; 100 MG/5ML; MG/5ML
5 SOLUTION ORAL 3 TIMES DAILY PRN
Qty: 236 ML | Refills: 1 | Status: SHIPPED | OUTPATIENT
Start: 2018-07-16 | End: 2018-12-29

## 2018-07-16 NOTE — Clinical Note
Hi, please call him to schedule a 4 month followup, it did not happen when he left yesterday. Thank you, Ted Paula

## 2018-07-16 NOTE — PROGRESS NOTES
Subjective:       Patient ID: Herminio Burger is a 72 y.o. male.    Chief Complaint: Recurrent Skin Infections (on back of thigh since 2014, just started bothering him)    Wants cyst back of thigh examined.    Bad cough again, used up cough syurp. Green mucous coughing up, no blood. No f/c/ns. No significant worsened sob. I asked if any blood he said no, but then in office scant blood coughed up.    Seeing rad onc and will be starting XRT for presumed lung cancer abutting Aorta, LANNY.    No wt loss, appetite is OK.    optimistic re upcoming XRT care.      Review of Systems   Constitutional: Negative for activity change, fever and unexpected weight change.   HENT: Positive for hearing loss (L ear).    Respiratory: Positive for cough (chronic) and wheezing. Negative for chest tightness.    Cardiovascular: Negative for chest pain, palpitations and leg swelling.   Gastrointestinal: Negative for abdominal distention, abdominal pain, nausea and vomiting.   Genitourinary: Negative for decreased urine volume and difficulty urinating.   Musculoskeletal: Negative for joint swelling.   Skin: Negative for rash and wound.   Neurological: Negative for tremors, syncope and weakness.   Psychiatric/Behavioral: Negative for confusion.       Objective:      Physical Exam   Constitutional: He is oriented to person, place, and time. He appears well-developed and well-nourished. No distress.   HENT:   Head: Normocephalic and atraumatic.   Mouth/Throat: No oropharyngeal exudate.   Eyes: EOM are normal. Pupils are equal, round, and reactive to light. No scleral icterus.   Neck: Normal range of motion. Neck supple. No thyromegaly present.   Cardiovascular: Normal rate, regular rhythm and normal heart sounds.  Exam reveals no gallop and no friction rub.    No murmur heard.  Pulmonary/Chest: Effort normal. No respiratory distress. He has wheezes. He has no rales.   Moderate wheeze   Abdominal: Soft. Bowel sounds are normal. He exhibits no  distension and no mass. There is no tenderness. There is no rebound and no guarding.   Multiple abd scars   Musculoskeletal: Normal range of motion. He exhibits no edema.   Neck decreased rom and painful rom linda to the L.  nl upper extrem strength/sense    Shoulders normal rom   Lymphadenopathy:     He has no cervical adenopathy.   Neurological: He is alert and oriented to person, place, and time.   Skin: No lesion noted. He is not diaphoretic.   L posterior thigh with nodule with central puncta c/w epiderma cyst, no purulence expressed   Psychiatric: He has a normal mood and affect. His behavior is normal. Thought content normal.       Assessment:       1. Cough    2. COPD exacerbation        Plan:       Herminio was seen today for recurrent skin infections.    Diagnoses and all orders for this visit:    Cough  -     guaifenesin-codeine 100-10 mg/5 ml (TUSSI-ORGANIDIN NR)  mg/5 mL syrup; Take 5 mLs by mouth 3 (three) times daily as needed for Cough.  COPD   -     guaifenesin-codeine 100-10 mg/5 ml (TUSSI-ORGANIDIN NR)  mg/5 mL syrup; Take 5 mLs by mouth 3 (three) times daily as needed for Cough.  He does not have a clear bacterial infection -- lack of systemic symptoms and cough seems more chronic than acute, more related to known copd than a flare.  Take symbicort twice per day, has it at home    L thigh nodule -- ? Epiderma cyst, will see Dr. Coats his dermatologist/    Lung ca -- has rad onc XRT scheduled this week      Health Maintenance       Date Due Completion Date    TETANUS VACCINE 12/24/1961 ---    High Dose Statin 12/24/1964 ---    Zoster Vaccine 12/24/2003 ---    Influenza Vaccine 08/01/2018 3/6/2018    Lipid Panel 03/06/2023 3/6/2018    Colonoscopy 10/06/2024 10/6/2014          Follow-up in about 4 months (around 11/16/2018).

## 2018-07-18 PROCEDURE — 77386 HC IMRT, COMPLEX: CPT | Performed by: RADIOLOGY

## 2018-07-18 PROCEDURE — 77014 HC CT GUIDANCE RADIATION THERAPY FLDS PLACEMENT: CPT | Mod: TC | Performed by: RADIOLOGY

## 2018-07-19 ENCOUNTER — HOSPITAL ENCOUNTER (OUTPATIENT)
Dept: RADIOLOGY | Facility: HOSPITAL | Age: 73
Discharge: HOME | End: 2018-07-19
Attending: OTOLARYNGOLOGY
Payer: MEDICARE

## 2018-07-19 DIAGNOSIS — R29.818 NEUROLOGICAL DEFICIT PRESENT: ICD-10-CM

## 2018-07-19 DIAGNOSIS — H91.22 SUDDEN LEFT HEARING LOSS: ICD-10-CM

## 2018-07-19 PROCEDURE — 25500020 PHARM REV CODE 255: Performed by: OTOLARYNGOLOGY

## 2018-07-19 PROCEDURE — 70553 MRI BRAIN STEM W/O & W/DYE: CPT | Mod: 26,,, | Performed by: RADIOLOGY

## 2018-07-19 PROCEDURE — 70553 MRI BRAIN STEM W/O & W/DYE: CPT | Mod: TC

## 2018-07-19 PROCEDURE — A9585 GADOBUTROL INJECTION: HCPCS | Performed by: OTOLARYNGOLOGY

## 2018-07-19 RX ORDER — GADOBUTROL 604.72 MG/ML
8 INJECTION INTRAVENOUS
Status: COMPLETED | OUTPATIENT
Start: 2018-07-19 | End: 2018-07-19

## 2018-07-19 RX ADMIN — GADOBUTROL 8 ML: 604.72 INJECTION INTRAVENOUS at 10:07

## 2018-07-20 PROCEDURE — 77386 HC IMRT, COMPLEX: CPT | Performed by: RADIOLOGY

## 2018-07-20 PROCEDURE — 77014 HC CT GUIDANCE RADIATION THERAPY FLDS PLACEMENT: CPT | Mod: TC | Performed by: RADIOLOGY

## 2018-07-23 PROCEDURE — G6002 STEREOSCOPIC X-RAY GUIDANCE: HCPCS | Mod: 26,,, | Performed by: RADIOLOGY

## 2018-07-23 PROCEDURE — 77386 HC IMRT, COMPLEX: CPT | Performed by: RADIOLOGY

## 2018-07-24 ENCOUNTER — TELEPHONE (OUTPATIENT)
Dept: OTOLARYNGOLOGY | Facility: CLINIC | Age: 73
End: 2018-07-24

## 2018-07-25 PROCEDURE — 77014 HC CT GUIDANCE RADIATION THERAPY FLDS PLACEMENT: CPT | Mod: TC | Performed by: RADIOLOGY

## 2018-07-25 PROCEDURE — 77386 HC IMRT, COMPLEX: CPT | Performed by: RADIOLOGY

## 2018-07-26 ENCOUNTER — DOCUMENTATION ONLY (OUTPATIENT)
Dept: RADIATION ONCOLOGY | Facility: CLINIC | Age: 73
End: 2018-07-26

## 2018-07-26 NOTE — PLAN OF CARE
Problem: Patient Care Overview  Goal: Plan of Care Review  Outcome: Ongoing (interventions implemented as appropriate)  Pt. On day 4 of xrt to lung.  SBRT.  Denies pain.  No cough.

## 2018-07-27 PROCEDURE — 77336 RADIATION PHYSICS CONSULT: CPT | Performed by: RADIOLOGY

## 2018-07-27 PROCEDURE — 77386 HC IMRT, COMPLEX: CPT | Performed by: RADIOLOGY

## 2018-07-27 PROCEDURE — 77014 HC CT GUIDANCE RADIATION THERAPY FLDS PLACEMENT: CPT | Mod: TC | Performed by: RADIOLOGY

## 2018-07-30 ENCOUNTER — DOCUMENTATION ONLY (OUTPATIENT)
Dept: RADIATION ONCOLOGY | Facility: CLINIC | Age: 73
End: 2018-07-30

## 2018-07-30 PROCEDURE — 77014 HC CT GUIDANCE RADIATION THERAPY FLDS PLACEMENT: CPT | Mod: TC | Performed by: RADIOLOGY

## 2018-07-30 PROCEDURE — 77386 HC IMRT, COMPLEX: CPT | Performed by: RADIOLOGY

## 2018-07-30 NOTE — PLAN OF CARE
Problem: Patient Care Overview  Goal: Plan of Care Review  Outcome: Ongoing (interventions implemented as appropriate)  Pt. On day 6 of sbrt to lung.  Pt. With cough, no hemoptysis. Denies pain.  On decadron prior to treatments.  rtc 6 weeks.

## 2018-08-01 ENCOUNTER — HOSPITAL ENCOUNTER (OUTPATIENT)
Dept: RADIATION THERAPY | Facility: HOSPITAL | Age: 73
Discharge: HOME | End: 2018-08-01
Attending: RADIOLOGY
Payer: MEDICARE

## 2018-08-01 PROCEDURE — 77014 HC CT GUIDANCE RADIATION THERAPY FLDS PLACEMENT: CPT | Mod: TC | Performed by: RADIOLOGY

## 2018-08-01 PROCEDURE — 77386 HC IMRT, COMPLEX: CPT | Performed by: RADIOLOGY

## 2018-08-03 PROCEDURE — 77386 HC IMRT, COMPLEX: CPT | Performed by: RADIOLOGY

## 2018-08-03 PROCEDURE — 77014 HC CT GUIDANCE RADIATION THERAPY FLDS PLACEMENT: CPT | Mod: TC | Performed by: RADIOLOGY

## 2018-08-03 PROCEDURE — 77336 RADIATION PHYSICS CONSULT: CPT | Performed by: RADIOLOGY

## 2018-08-06 ENCOUNTER — DOCUMENTATION ONLY (OUTPATIENT)
Dept: RADIATION ONCOLOGY | Facility: CLINIC | Age: 73
End: 2018-08-06

## 2018-08-06 NOTE — PLAN OF CARE
Problem: Patient Care Overview  Goal: Plan of Care Review  Outcome: Outcome(s) achieved Date Met: 08/06/18  Pt. Completed sbrt to lung.  rtc 6 weeks.

## 2018-09-11 ENCOUNTER — TELEPHONE (OUTPATIENT)
Dept: OTOLARYNGOLOGY | Facility: CLINIC | Age: 73
End: 2018-09-11

## 2018-09-11 NOTE — TELEPHONE ENCOUNTER
[facility] ,  MRI NORMAL per DR MURCIA telephone encounter

## 2018-09-11 NOTE — TELEPHONE ENCOUNTER
----- Message from Leslie Vasquez sent at 9/11/2018  1:13 PM CDT -----  Contact: patient  [phone]-please call above patient at number in message need to speak with the nurse

## 2018-09-14 ENCOUNTER — TELEPHONE (OUTPATIENT)
Dept: RADIATION ONCOLOGY | Facility: CLINIC | Age: 73
End: 2018-09-14

## 2018-09-14 NOTE — TELEPHONE ENCOUNTER
Spoke with pt. Re: ct scan prior to 6 week f/u. Pt. Will discuss with md at f/u on Monday.  No orders available.  Pt. Verbalized understanding of same.

## 2018-09-17 ENCOUNTER — OFFICE VISIT (OUTPATIENT)
Dept: RADIATION ONCOLOGY | Facility: CLINIC | Age: 73
End: 2018-09-17
Payer: MEDICARE

## 2018-09-17 VITALS
SYSTOLIC BLOOD PRESSURE: 161 MMHG | RESPIRATION RATE: 20 BRPM | HEIGHT: 71 IN | DIASTOLIC BLOOD PRESSURE: 75 MMHG | HEART RATE: 77 BPM | WEIGHT: 179.13 LBS | TEMPERATURE: 97 F | BODY MASS INDEX: 25.08 KG/M2

## 2018-09-17 DIAGNOSIS — R91.1 LUNG NODULE: Primary | ICD-10-CM

## 2018-09-17 DIAGNOSIS — Z87.09 HISTORY OF PNEUMOTHORAX: ICD-10-CM

## 2018-09-17 DIAGNOSIS — C34.92 NON-SMALL CELL CANCER OF LEFT LUNG: ICD-10-CM

## 2018-09-17 PROCEDURE — 99213 OFFICE O/P EST LOW 20 MIN: CPT | Mod: S$PBB,,, | Performed by: RADIOLOGY

## 2018-09-17 PROCEDURE — 1101F PT FALLS ASSESS-DOCD LE1/YR: CPT | Mod: CPTII,,, | Performed by: RADIOLOGY

## 2018-09-17 PROCEDURE — 3078F DIAST BP <80 MM HG: CPT | Mod: CPTII,,, | Performed by: RADIOLOGY

## 2018-09-17 PROCEDURE — 99999 PR PBB SHADOW E&M-EST. PATIENT-LVL III: CPT | Mod: PBBFAC,,, | Performed by: RADIOLOGY

## 2018-09-17 PROCEDURE — 99213 OFFICE O/P EST LOW 20 MIN: CPT | Mod: PBBFAC | Performed by: RADIOLOGY

## 2018-09-17 PROCEDURE — 3077F SYST BP >= 140 MM HG: CPT | Mod: CPTII,,, | Performed by: RADIOLOGY

## 2018-09-17 NOTE — PROGRESS NOTES
"REFERRING PHYSICIAN: Kaushik Christina MD    DIAGNOSIS: Hypermetabolic left upper lobe nodule, T1 N0 M0    Radiation Treatment Summary:  Mr. Burger completed radiation to the left upper lobe nodule as shown below.    Treatment Site Energy Dose/Fx (Gy) #Fx Total Dose (Gy) Start Date End Date   SBRT-LT Lung 6X 7.5 8 / 8 60 7/18/2018 8/3/2018     INTERVAL HISTORY:   Mr. Burger is a 72-year-old male with history of hypertension, COPD, spontaneous pneumothorax and colon perforation in 2014, now with left upper lobe nodule found on a CT scan in March 2018 after evaluation for upper respiratory infection. At that time, the CT revealed a solid lesion with groundglass halo abutting the aorta and measuring approximately 4 x 2.1 x 3.9 cm overall (solid component measures 2.7 x 1.4 x 1.8 cm). After consultation with Dr. Christina, this was deemed to be difficult to biopsy due to its abutment of the aorta and close monitoring was recommended. A repeat CT of the chest with contrast on June 20, 2018 again revealed this lesion which was measured at 1.6 x 1.2 x 1.4 cm with possible extension up to 3.1 cm along the aortic isthmus. A PET scan on the same day revealed this lesion with uptake of 15.1. No other hypermetabolic areas were noted. After much discussion in the thoracic tumor board, the recommendation was to treat with focal radiation to this lesion using SBRT technique due to the risk of surgical complications. He received focal radiation with SBRT setup to the left upper lobe nodule as above.  He is here today for a routine follow-up.    At present, patient denies cough, shortness of breath, dysphagia, chest pain, fever, night sweats, or weight loss.    PHYSICAL EXAMINATION:  VITAL SINGS: BP (!) 161/75   Pulse 77   Temp 97.3 °F (36.3 °C) (Oral)   Resp 20   Ht 5' 11" (1.803 m)   Wt 81.2 kg (179 lb 1.6 oz)   BMI 24.98 kg/m²   GENERAL: Patient is alert and oriented, in no acute distress.  HEENT: Extraocular muscles are " intact.  Oropharynx is clear without lesions.  There is no cervical or supraclavicular adenopathy palpated.    CHEST: Breath sounds clear bilaterally.  No rales.  No rhonchi.  Unlabored respirations.  CARDIOVASCULAR: Normal S1, S2.  Normal rate.  Regular rhythm.  ABDOMEN: Bowel sounds normal.  No tenderness.  No abdominal distention.  No hepatomegaly.  No splenomegaly.  EXTREMITIES: No clubbing, cyanosis, edema  NEUROLOGIC: Cranial nerves II through XII are grossly intact.  Sensation is intact.  Strength is 5 out of 5 in the upper and lower extremities bilaterally.    ASSESSMENT:   This is a 72-year-old male with history of right pneumothorax requiring surgical repair, now with a hypermetabolic left upper lobe nodule which is at high risk for biopsy for which he completed SBRT.    PLAN:   Mr. Burger is recovering from the radiation without any unexpected side effects.  I recommend a repeat PET scan in approximately 2 months which was ordered today.  I plan to see him back after the PET scan is completed, unless symptoms warrant otherwise.

## 2018-11-06 DIAGNOSIS — Z87.09 HISTORY OF PNEUMOTHORAX: Primary | ICD-10-CM

## 2018-11-19 ENCOUNTER — OFFICE VISIT (OUTPATIENT)
Dept: INTERNAL MEDICINE | Facility: CLINIC | Age: 73
End: 2018-11-19
Payer: MEDICARE

## 2018-11-19 ENCOUNTER — HOSPITAL ENCOUNTER (OUTPATIENT)
Dept: RADIOLOGY | Facility: HOSPITAL | Age: 73
Discharge: HOME | End: 2018-11-19
Attending: RADIOLOGY
Payer: MEDICARE

## 2018-11-19 ENCOUNTER — OFFICE VISIT (OUTPATIENT)
Dept: RADIATION ONCOLOGY | Facility: CLINIC | Age: 73
End: 2018-11-19
Payer: MEDICARE

## 2018-11-19 ENCOUNTER — IMMUNIZATION (OUTPATIENT)
Dept: INTERNAL MEDICINE | Facility: CLINIC | Age: 73
End: 2018-11-19
Payer: MEDICARE

## 2018-11-19 VITALS
DIASTOLIC BLOOD PRESSURE: 85 MMHG | HEART RATE: 95 BPM | OXYGEN SATURATION: 98 % | WEIGHT: 175.5 LBS | BODY MASS INDEX: 24.57 KG/M2 | SYSTOLIC BLOOD PRESSURE: 135 MMHG | HEIGHT: 71 IN

## 2018-11-19 VITALS
HEART RATE: 95 BPM | HEIGHT: 71 IN | RESPIRATION RATE: 20 BRPM | WEIGHT: 175.5 LBS | TEMPERATURE: 98 F | BODY MASS INDEX: 24.57 KG/M2 | OXYGEN SATURATION: 98 % | SYSTOLIC BLOOD PRESSURE: 135 MMHG | DIASTOLIC BLOOD PRESSURE: 85 MMHG

## 2018-11-19 DIAGNOSIS — M47.812 ARTHRITIS OF NECK: ICD-10-CM

## 2018-11-19 DIAGNOSIS — C34.92 NON-SMALL CELL CANCER OF LEFT LUNG: ICD-10-CM

## 2018-11-19 DIAGNOSIS — R39.12 WEAK URINE STREAM: Primary | ICD-10-CM

## 2018-11-19 DIAGNOSIS — R91.1 LUNG NODULE: Primary | ICD-10-CM

## 2018-11-19 LAB — POCT GLUCOSE: 98 MG/DL (ref 70–110)

## 2018-11-19 PROCEDURE — G0008 ADMIN INFLUENZA VIRUS VAC: HCPCS | Mod: HCNC,S$GLB,, | Performed by: INTERNAL MEDICINE

## 2018-11-19 PROCEDURE — 3079F DIAST BP 80-89 MM HG: CPT | Mod: CPTII,HCNC,S$GLB, | Performed by: INTERNAL MEDICINE

## 2018-11-19 PROCEDURE — 1101F PT FALLS ASSESS-DOCD LE1/YR: CPT | Mod: CPTII,HCNC,S$GLB, | Performed by: INTERNAL MEDICINE

## 2018-11-19 PROCEDURE — 3075F SYST BP GE 130 - 139MM HG: CPT | Mod: CPTII,HCNC,S$GLB, | Performed by: RADIOLOGY

## 2018-11-19 PROCEDURE — A9552 F18 FDG: HCPCS | Mod: HCNC

## 2018-11-19 PROCEDURE — 99213 OFFICE O/P EST LOW 20 MIN: CPT | Mod: HCNC,S$GLB,, | Performed by: RADIOLOGY

## 2018-11-19 PROCEDURE — 99214 OFFICE O/P EST MOD 30 MIN: CPT | Mod: HCNC,S$GLB,, | Performed by: INTERNAL MEDICINE

## 2018-11-19 PROCEDURE — 3079F DIAST BP 80-89 MM HG: CPT | Mod: CPTII,HCNC,S$GLB, | Performed by: RADIOLOGY

## 2018-11-19 PROCEDURE — 99999 PR PBB SHADOW E&M-EST. PATIENT-LVL IV: CPT | Mod: PBBFAC,HCNC,, | Performed by: INTERNAL MEDICINE

## 2018-11-19 PROCEDURE — 90662 IIV NO PRSV INCREASED AG IM: CPT | Mod: HCNC,S$GLB,, | Performed by: INTERNAL MEDICINE

## 2018-11-19 PROCEDURE — 78815 PET IMAGE W/CT SKULL-THIGH: CPT | Mod: 26,HCNC,PS, | Performed by: RADIOLOGY

## 2018-11-19 PROCEDURE — 3075F SYST BP GE 130 - 139MM HG: CPT | Mod: CPTII,HCNC,S$GLB, | Performed by: INTERNAL MEDICINE

## 2018-11-19 PROCEDURE — 78815 PET IMAGE W/CT SKULL-THIGH: CPT | Mod: TC,HCNC,PS

## 2018-11-19 PROCEDURE — 1101F PT FALLS ASSESS-DOCD LE1/YR: CPT | Mod: CPTII,HCNC,S$GLB, | Performed by: RADIOLOGY

## 2018-11-19 PROCEDURE — 99999 PR PBB SHADOW E&M-EST. PATIENT-LVL III: CPT | Mod: PBBFAC,HCNC,, | Performed by: RADIOLOGY

## 2018-11-19 RX ORDER — HYDROCODONE BITARTRATE AND ACETAMINOPHEN 5; 325 MG/1; MG/1
1 TABLET ORAL EVERY 8 HOURS PRN
Qty: 21 TABLET | Refills: 0 | Status: ON HOLD | OUTPATIENT
Start: 2018-11-19 | End: 2019-04-16 | Stop reason: SDUPTHER

## 2018-11-19 RX ORDER — TAMSULOSIN HYDROCHLORIDE 0.4 MG/1
0.4 CAPSULE ORAL DAILY
COMMUNITY
End: 2019-01-16

## 2018-11-19 NOTE — PROGRESS NOTES
REFERRING PHYSICIAN: Kaushik Christina MD     DIAGNOSIS: Hypermetabolic left upper lobe nodule, T1 N0 M0     Radiation Treatment Summary:  Mr. Burger completed radiation to the left upper lobe nodule as shown below.     Treatment Site Energy Dose/Fx (Gy) #Fx Total Dose (Gy) Start Date End Date   SBRT-LT Lung 6X 7.5 8 / 8 60 7/18/2018 8/3/2018      INTERVAL HISTORY:   Mr. Burger is a 72-year-old male with history of hypertension, COPD, spontaneous pneumothorax and colon perforation in 2014 who underwent SBRT focal radiation to a left upper lobe nodule who returns for follow-up.    He was diagnosed after he was found to have a left upper lobe nodule found on a CT scan in March 2018 after evaluation for upper respiratory infection. At that time, the CT revealed a solid lesion with groundglass halo abutting the aorta and measuring approximately 4 x 2.1 x 3.9 cm overall (solid component measures 2.7 x 1.4 x 1.8 cm). After consultation with Dr. Christina, this was deemed to be difficult to biopsy due to its abutment of the aorta and close monitoring was recommended. A repeat CT of the chest with contrast on June 20, 2018 again revealed this lesion which was measured at 1.6 x 1.2 x 1.4 cm with possible extension up to 3.1 cm along the aortic isthmus. A PET scan on the same day revealed this lesion with uptake of 15.1. No other hypermetabolic areas were noted. After much discussion in the thoracic tumor board, the recommendation was to treat with focal radiation to this lesion using SBRT technique due to the risk of surgical complications. He received focal radiation with SBRT setup to the left upper lobe nodule as above.  He is here today for a routine follow-up.     Since I saw him last, he underwent a PET scan earlier today is.  This reveals complete resolution of the hypermetabolic left upper lobe nodule with other uptake.  At present, patient denies cough, shortness of breath, dysphagia, chest pain, fever, night  "sweats, or weight loss. He is scheduled for a follow-up with Dr. Christina in 1 month.    PHYSICAL EXAMINATION:  VITAL SINGS: /85   Pulse 95   Temp 98 °F (36.7 °C) (Oral)   Resp 20   Ht 5' 11" (1.803 m)   Wt 79.6 kg (175 lb 7.8 oz)   SpO2 98%   BMI 24.48 kg/m²   GENERAL: Patient is alert and oriented, in no acute distress.  HEENT: Extraocular muscles are intact.  Oropharynx is clear without lesions.  There is no cervical or supraclavicular adenopathy palpated.    CHEST: Breath sounds clear bilaterally.  No rales.  No rhonchi.  Unlabored respirations.  CARDIOVASCULAR: Normal S1, S2.  Normal rate.  Regular rhythm.  ABDOMEN: Bowel sounds normal.  No tenderness.  No abdominal distention.  No hepatomegaly.  No splenomegaly.  EXTREMITIES: No clubbing, cyanosis, edema  NEUROLOGIC: Cranial nerves II through XII are grossly intact.  Sensation is intact.  Strength is 5 out of 5 in the upper and lower extremities bilaterally.    ASSESSMENT:   This is a 72-year-old male with T1 N0 M0 left upper lobe hypermetabolic lesion who underwent definitive radiation using SBRT.    PLAN:   After review of the images of the PET scan, the hypermetabolic left upper lobe nodule has resolved.  He has no other uptake to suggest recurrence or metastatic disease.  He will continue follow-up with Dr. Christina as planned in 1 month and continue to follow-up with him.  I plan to see him back as needed, unless symptoms warrant otherwise.      "

## 2018-11-19 NOTE — PROGRESS NOTES
Subjective:       Patient ID: Herminio Burger is a 72 y.o. male.    Chief Complaint: Follow-up    Patient is here for followup for chronic conditions.    Discomfort in L groin, comes with certain movements and cough. Feels it partially in the scrotum. Denies any dysuria, no blood in urine as well. Symptoms come and go, has been 1 month at least.    No symptoms of obstruction.    Has had a weak urinary stream and spraying in various directions, [facility] and started on flomax he thinks w/o clear help.    Last month had some bad coughing illness which improved with vinegar.    Finished XRT for lung ca and feeling OK in general.      Review of Systems   Constitutional: Negative for activity change, fever and unexpected weight change.   Respiratory: Positive for cough (chronic) and wheezing. Negative for chest tightness.    Cardiovascular: Negative for chest pain, palpitations and leg swelling.   Gastrointestinal: Negative for abdominal distention, abdominal pain, nausea and vomiting.   Genitourinary: Negative for decreased urine volume, difficulty urinating and scrotal swelling.   Musculoskeletal: Negative for joint swelling.   Skin: Negative for rash and wound.   Neurological: Negative for tremors, syncope and weakness.   Psychiatric/Behavioral: Negative for confusion.       Objective:      Physical Exam   Constitutional: He is oriented to person, place, and time. He appears well-developed and well-nourished. No distress.   HENT:   Head: Normocephalic and atraumatic.   Mouth/Throat: No oropharyngeal exudate.   Eyes: EOM are normal. Pupils are equal, round, and reactive to light. No scleral icterus.   Neck: Normal range of motion. Neck supple. No thyromegaly present.   Cardiovascular: Normal rate, regular rhythm and normal heart sounds. Exam reveals no gallop and no friction rub.   No murmur heard.  Pulmonary/Chest: Effort normal. No respiratory distress. He has wheezes. He has no rales.   Moderate  wheeze   Abdominal: Soft. Bowel sounds are normal. He exhibits no distension and no mass. There is no tenderness. There is no rebound and no guarding.   Multiple abd scars    Some tenderness L sided lower abd, but no rebound, abd is stoft   Genitourinary:   Genitourinary Comments: tenderness L scrotal seems local to epididymus.  No inguinal hernia palpated.     Musculoskeletal: Normal range of motion. He exhibits no edema.   Lymphadenopathy:     He has no cervical adenopathy.   Neurological: He is alert and oriented to person, place, and time.   Skin: No lesion noted. He is not diaphoretic.   L posterior thigh with nodule with central puncta c/w epiderma cyst, no purulence expressed   Psychiatric: He has a normal mood and affect. His behavior is normal. Thought content normal.       Assessment:       1. Weak urine stream    2. Arthritis of neck        Plan:       Herminio was seen today for follow-up.    Diagnoses and all orders for this visit:    Weak urine stream  -     Ambulatory referral to Urology  He may have epididimitis, but unusual to have symptoms come and go and for 1 month. Could be prostatic concretions as well.  No urinary symptoms so no need for UA/cx for now      Arthritis of neck  -     HYDROcodone-acetaminophen (NORCO) 5-325 mg per tablet; Take 1 tablet by mouth every 8 (eight) hours as needed for Pain.  Uses prn and 30 tabs lasts 6 mos    Lung cancer -- scan today looks like good XRT response    Health Maintenance       Date Due Completion Date    TETANUS VACCINE 12/24/1961 ---    High Dose Statin 12/24/1964 ---    Zoster Vaccine 12/24/2003 ---    Influenza Vaccine 08/01/2018 3/6/2018    Lipid Panel 03/06/2023 3/6/2018    Colonoscopy 10/06/2024 10/6/2014      Flu vax please      Follow-up in about 6 months (around 5/19/2019).    Future Appointments   Date Time Provider Department Center   11/21/2018  8:40 AM Marissa Staples MD [facility] UROLOGY Kenny Simpson   12/28/2018  8:45 AM [facility] 500 LB LIMIT  [facility]   12/28/2018 10:00 AM Kaushik Christina MD [facility] THORAC Jorge Dunn

## 2018-11-21 ENCOUNTER — OFFICE VISIT (OUTPATIENT)
Dept: UROLOGY | Facility: CLINIC | Age: 73
End: 2018-11-21
Payer: MEDICARE

## 2018-11-21 VITALS
HEART RATE: 70 BPM | HEIGHT: 71 IN | WEIGHT: 178.38 LBS | BODY MASS INDEX: 24.97 KG/M2 | DIASTOLIC BLOOD PRESSURE: 82 MMHG | SYSTOLIC BLOOD PRESSURE: 152 MMHG

## 2018-11-21 DIAGNOSIS — R39.13 SPLIT OF URINARY STREAM: Primary | ICD-10-CM

## 2018-11-21 DIAGNOSIS — R33.9 INCOMPLETE BLADDER EMPTYING: ICD-10-CM

## 2018-11-21 PROCEDURE — 99999 PR PBB SHADOW E&M-EST. PATIENT-LVL IV: CPT | Mod: PBBFAC,HCNC,, | Performed by: UROLOGY

## 2018-11-21 PROCEDURE — 3077F SYST BP >= 140 MM HG: CPT | Mod: CPTII,HCNC,S$GLB, | Performed by: UROLOGY

## 2018-11-21 PROCEDURE — 1101F PT FALLS ASSESS-DOCD LE1/YR: CPT | Mod: CPTII,HCNC,S$GLB, | Performed by: UROLOGY

## 2018-11-21 PROCEDURE — 51798 US URINE CAPACITY MEASURE: CPT | Mod: S$GLB,,, | Performed by: UROLOGY

## 2018-11-21 PROCEDURE — 81002 URINALYSIS NONAUTO W/O SCOPE: CPT | Mod: S$GLB,,, | Performed by: UROLOGY

## 2018-11-21 PROCEDURE — 99214 OFFICE O/P EST MOD 30 MIN: CPT | Mod: HCNC,25,S$GLB, | Performed by: UROLOGY

## 2018-11-21 PROCEDURE — 3079F DIAST BP 80-89 MM HG: CPT | Mod: CPTII,HCNC,S$GLB, | Performed by: UROLOGY

## 2018-11-21 RX ORDER — AMOXICILLIN 250 MG/1
500 CAPSULE ORAL ONCE
Status: CANCELLED | OUTPATIENT
Start: 2018-11-21 | End: 2018-11-21

## 2018-11-21 RX ORDER — LIDOCAINE HYDROCHLORIDE 20 MG/ML
JELLY TOPICAL ONCE
Status: CANCELLED | OUTPATIENT
Start: 2018-11-21 | End: 2018-11-21

## 2018-11-21 NOTE — PROGRESS NOTES
CHIEF COMPLAINT:    Mr. Burger is a 72 y.o. male presenting for split urinary stream.  He was referred by Dr. Tde Paula    PRESENTING ILLNESS:    Herminio Burger is a 72 y.o. male who states that he had several surgeries in 2014 (bowel perforation, spontaneous pneumothorax, ischemic colon requiring resection) and a hernia repair at the time of cholecystectomy.  He states that his stream splits and sometimes sprays in a direction that he had not intended.  He feels like this stems from a small split in the urethral meatus.  He is on Flomax and it has not changed the manner in which he voids.  He denies any urgency, hesitancy.  He has frequency x 4-5, nocturia x 0, seldom x 1.  Denies any gross hematuria or recurrent UTI.      REVIEW OF SYSTEMS:    Review of Systems   Constitutional: Negative.    HENT: Negative.    Eyes: Negative.    Respiratory: Negative.    Cardiovascular: Negative.    Gastrointestinal: Negative.    Genitourinary:        Split stream   Musculoskeletal: Negative.    Skin: Negative.    Neurological: Negative.    Endo/Heme/Allergies: Negative.    Psychiatric/Behavioral: Negative.        PATIENT HISTORY:    Past Medical History:   Diagnosis Date    Asthma     Cervical spine arthritis 6/6/2018    Colon necrosis     ischemia    Colon polyps     COPD (chronic obstructive pulmonary disease)     DVT (deep venous thrombosis)     right arm resolved    Elevated liver enzymes     Emphysema     Gallstones     Herniated disc     Hypertension     Ileostomy in place     Ischemic colitis     Screening for colon cancer        Past Surgical History:   Procedure Laterality Date    ABDOMINAL REEXPLORATION      ABDOMINAL SURGERY      BRONCHOSCOPY-FIBEROPTIC N/A 2/27/2014    Performed by [facility]baldo Lucas [facility] at [facility] OR 2ND FLR    CHOLECYSTECTOMY      CHOLECYSTECTOMY N/A 11/11/2014    Performed by Adrian Morrissey MD at [facility] OR 2ND FLR    CLOSURE-ILEOSTOMY N/A 11/11/2014    Performed by Jessee  ESME Foster MD at [facility] OR 2ND FLR    COLON SURGERY      ilesostomy closure    COLONOSCOPY N/A 10/6/2014    Performed by Jessee Foster MD at [facility] ENDO (4TH FLR)    COLONOSCOPY N/A 3/3/2014    Performed by Jessee Foster MD at [facility] ENDO (2ND FLR)    COLONOSCOPY W/ POLYPECTOMY      COLOSTOMY      CREATION, ILEOSTOMY Right 3/3/2014    Performed by Jessee Foster MD at [facility] OR 2ND FLR    EGD (ESOPHAGOGASTRODUODENOSCOPY) N/A 4/17/2014    Performed by Hero Palacio MD at [facility] ENDO (2ND FLR)    ERCP N/A 11/7/2014    Performed by Berny Corbett MD at [facility] ENDO (2ND FLR)    ESOPHAGOGASTRODUODENOSCOPY (EGD) N/A 10/28/2014    Performed by Raul Mckinley MD at [facility] ENDO (4TH FLR)    EXPLORATORY-LAPAROTOMY N/A 3/19/2014    Performed by Bill Vee MD at [facility] OR 2ND FLR    EXPLORATORY-LAPAROTOMY N/A 3/3/2014    Performed by Jessee Foster MD at [facility] OR 2ND FLR    GASTROSTOMY TUBE PLACEMENT      REPAIR-HERNIA-VENTRAL N/A 11/11/2014    Performed by Adrian Morrissey MD at [facility] OR 2ND FLR    scalp lac       1970    SHOULDER ARTHROSCOPY      TONSILLECTOMY      ULTRASOUND-ENDOSCOPIC-UPPER N/A 11/7/2014    Performed by Berny Corbett MD at [facility] ENDO (2ND FLR)    VATS Right     VATS (VIDEO-ASSISTED THORACOSCOPIC SURGERY) Right 2/27/2014    Performed by Ubaldo Lucas MD at [facility] OR 2ND FLR       Family History   Problem Relation Age of Onset    Heart disease Mother     Diabetes Mother     Cancer Father     No Known Problems Sister     Cancer Brother         prostate     Nephrotic syndrome Daughter     Cancer Sister         gallbladder    Stroke Brother     Cancer Brother         liver, kidney, prostate    Congenital heart disease Brother     Cancer Daughter         breast    Anxiety disorder Daughter        Social History     Socioeconomic History    Marital status:    Tobacco Use    Smoking status: Former Smoker     Packs/day: 1.00     Years: 50.00     Pack  years: 50.00     Types: Cigarettes     Last attempt to quit: 2014     Years since quittin.7    Smokeless tobacco: Never Used   Substance and Sexual Activity    Alcohol use: Yes     Alcohol/week: 0.0 oz     Comment: less than one monthly    Drug use: Yes     Types: Marijuana     Comment: daily    Sexual activity: No   Social History Narrative    Previous . 3 kids, 51, 49, 48. No exercise.       Allergies:  Patient has no known allergies.    Medications:  Outpatient Encounter Medications as of 2018   Medication Sig Dispense Refill    albuterol 90 mcg/actuation inhaler Inhale 2 puffs into the lungs every 6 (six) hours as needed for Wheezing. 18 g 11    BANOPHEN 50 mg capsule TK ONE C PO  BID PRN  1    diphenhydrAMINE (BENADRYL) 25 mg capsule Take 1 each (25 mg total) by mouth every 6 (six) hours as needed for Itching. 30 capsule 1    guaifenesin-codeine 100-10 mg/5 ml (TUSSI-ORGANIDIN NR)  mg/5 mL syrup Take 5 mLs by mouth 3 (three) times daily as needed for Cough. 236 mL 1    HYDROcodone-acetaminophen (NORCO) 5-325 mg per tablet Take 1 tablet by mouth every 8 (eight) hours as needed for Pain. 21 tablet 0    ibuprofen (ADVIL,MOTRIN) 200 MG tablet Take 200 mg by mouth 2 (two) times daily as needed for Pain.      losartan (COZAAR) 100 MG tablet TAKE 1 TABLET BY MOUTH EVERY DAY 90 tablet 11    tamsulosin (FLOMAX) 0.4 mg Cap Take 0.4 mg by mouth once daily.      triamcinolone acetonide 0.1% (KENALOG) 0.1 % ointment YEIMY TO RASH ON BODY BID PRN FOR ITCHING AND FLARES  1    vitamin E 1000 UNIT capsule Take 1,000 Units by mouth once daily.       No facility-administered encounter medications on file as of 2018.          PHYSICAL EXAMINATION:    The patient generally appears in good health, is appropriately interactive, and is in no apparent distress.    Skin: No lesions.    Mental: Cooperative with normal affect.    Neuro: Grossly intact.    HEENT: Normal. No evidence of  lymphadenopathy.    Chest:  normal inspiratory effort.    Abdomen: Soft, non-tender. No masses or organomegaly. Bladder is not palpable. No evidence of flank discomfort. No evidence of inguinal hernia.    Extremities: No clubbing, cyanosis, or edema    Scrotum showed no rashes or lesions. Testicles showed no masses or tenderness.  Epididymis showed no masses or tenderness.  Penis was un-circumcised. No meatal stenosis. No penile discharge.  No inguinal hernias.  No inguinal lymphadenopathy.   CARROL:  60 g prostate without nodularity.  Normal rectal tone, no anal masses.   PVR by bladder scan was 124 ml    LABS:    Lab Results   Component Value Date    BUN 10 07/12/2018    CREATININE 1.2 07/12/2018     UA 1.005, pH 7, otherwise, negative    IMPRESSION:    Encounter Diagnoses   Name Primary?    Split of urinary stream Yes    Incomplete bladder emptying        PLAN:    1.  SUDS/Cysto      Copy to: Dr. Paula

## 2018-11-21 NOTE — PATIENT INSTRUCTIONS
Cystoscopy    Cystoscopy is a procedure that lets your doctor look directly inside your urethra and bladder. It can be used to:  · Help diagnose a problem with your urethra, bladder, or kidneys.  · Take a sample (biopsy) of bladder or urethral tissue.  · Treat certain problems (such as removing kidney stones).  · Place a stent to bypass an obstruction.  · Take special X-rays of the kidneys.  Based on the findings, your doctor may recommend other tests or treatments.  What is a cystoscope?  A cystoscope is a telescope-like instrument that contains lenses and fiberoptics (small glass wires that make bright light). The cystoscope may be straight and rigid, or flexible to bend around curves in the urethra. The doctor may look directly into the cystoscope, or project the image onto a monitor.  Getting ready  · Ask your doctor if you should stop taking any medicines before the procedure.  · Ask whether you should avoid eating or drinking anything after midnight before the procedure.  · Follow any other instructions your doctor gives you.  Tell your doctor before the exam if you:  · Take any medicines, such as aspirin or blood thinners  · Have allergies to any medicines  · Are pregnant   The procedure  Cystoscopy is done in the doctors office, surgery center, or hospital. The doctor and a nurse are present during the procedure. It takes only a few minutes, longer if a biopsy, X-ray, or treatment needs to be done.  During the procedure:  · You lie on an exam table on your back, knees bent and legs apart. You are covered with a drape.  · Your urethra and the area around it are washed. Anesthetic jelly may be applied to numb the urethra. Other pain medicine is usually not needed. In some cases, you may be offered a mild sedative to help you relax. If a more extensive procedure is to be done, such as a biopsy or kidney stone removal, general anesthesia may be needed.  · The cystoscope is inserted. A sterile fluid is put  into the bladder to expand it. You may feel pressure from this fluid.  · When the procedure is done, the cystoscope is removed.  After the procedure  If you had a sedative, general anesthesia, or spinal anesthesia, you must have someone drive you home. Once youre home:  · Drink plenty of fluids.  · You may have burning or light bleeding when you urinate--this is normal.  · Medicines may be prescribed to ease any discomfort or prevent infection. Take these as directed.  · Call your doctor if you have heavy bleeding or blood clots, burning that lasts more than a day, a fever over 100°F  (38° C), or trouble urinating.  Date Last Reviewed: 1/1/2017 © 2000-2017 [employer]. [address] [place] [address], [place] [address]. All rights reserved. This information is not intended as a substitute for professional medical care. Always follow your healthcare professional's instructions.          Urodynamics Studies     The bladder holds urine until it leaves the body through the urethra.     Urodynamics studies are a series of tests that give your doctor a close look at the working of your bladder and urethra. The tests can help your doctor learn about any problems storing urine or voiding (eliminating) urine from your body.  Understanding the lower urinary tract  The lower part of the urinary tract has several parts.  · The bladder stores urine until youre ready to release it.  · The urethra is the tube that carries urine from the bladder out of the body.  · The sphincter is made up of muscles around the opening of the bladder. The sphincter muscles tighten to hold urine in the bladder. They relax to let urine flow. Signals from the brain tell the sphincter when to tighten and relax. These signals also tell the bladder when to contract to let urine flow out of the body.  Why you need a urodynamics study  This test may be ordered if you:  · Are incontinent (leak urine)  · Have a bladder that does not empty all the  way.  · Have symptoms such as the need to urinate often or a constant strong need to urinate  · Have intermittent or weak urine stream  · Have persistent urinary tract infections  Preparing for the study  · Tell your doctor about any medicine youre taking. Ask if you should stop them before the study.  · Keep a diary of your bathroom habits. Do this for a few days before the study. This diary can be a helpful part of the evaluation.  · Ask if you need to arrive for the study with a full bladder.  Date Last Reviewed: 1/1/2017 © 2000-2017 [employer]. [address] [place] [id number]. All rights reserved. This information is not intended as a substitute for professional medical care. Always follow your healthcare professional's instructions.          Urodynamic Studies     The equipment used for the study varies depending upon the facility and what tests are done.     Urodynamic studies may be done in your doctors office, a clinic, or a hospital. The studies may take up to an hour or more. This depends on which tests your doctor does. The tests are generally painless. You wont need sedating medicine.  Tests that may be done  Uroflowmetry. This measures the amount and speed of urine you void from your bladder. You urinate into a funnel. Its attached to a computer that records your urine flow over time. The amount of urine left in your bladder after you void may also be measured right after this test.  Cystometry. This test evaluates how much your bladder can hold. It also measures how strong your bladder muscle is and how well the signals work that tell you when your bladder is full. Your healthcare provider fills your bladder with sterile water or saline solution, through a catheter. Your doctor will instruct you to report any sensations you feel. Mention if theyre similar to symptoms youve felt at home. Your doctor may ask you to cough, stand and walk, or bear down during this  test.  Electromyogram. This helps evaluate the muscle contractions that control urination, such as sphincter muscle contractions. Your healthcare provider may place electrode patches or wires near your rectum or urethra to make the recording. He or she may ask you to try to tighten or relax your sphincter muscles during this test.  Pressure flow study. This test measures your detrusor, urethral, and abdominal pressures. Detrusor is the muscle surrounding the bladder walls that relaxes to allow your bladder to fill, and and contracts to squeeze out urine. A pressure flow study is often done after cystometry. Youre asked to urinate while a probe in your urethra measures pressures.  Video cystourethrography. This takes video pictures of urine flow through your urinary tract. It can help identify blockages or other problems. The bladder is filled with an X-ray contrast fluid. Then X-ray video pictures are taken as the fluid is urinated out. Ultrasound imaging may also be combined with routine urodynamic studies.  Ambulatory urodynamics. This test can be used to evaluate you while doing usual activities.  Getting your results  After the study, youll get dressed and return to the consultation room. Test results may be ready soon after the study is finished. Or, you may return to your doctors office in a few days for your results. Your doctor can talk with you about the study report and your options.   Date Last Reviewed: 1/1/2017 © 2000-2017 [employer]. [address] [place] [address]. All rights reserved. This information is not intended as a substitute for professional medical care. Always follow your healthcare professional's instructions.

## 2018-11-21 NOTE — LETTER
November 21, 2018      Ted Paula MD  [address]  [facility] [address]           [facility]  [address]  [facility] [address]  Phone: [phone]          Patient: Herminio Burger   MR Number: [id number]   YOB: 1945   Date of Visit: 11/21/2018       Dear Dr. Ted Paula:    Thank you for referring Herminio Burger to me for evaluation. Attached you will find relevant portions of my assessment and plan of care.    If you have questions, please do not hesitate to call me. I look forward to following Herminio Burger along with you.    Sincerely,    Marissa Staples MD    Enclosure  CC:  No Recipients    If you would like to receive this communication electronically, please contact [email or web address] or [phone] to request more information on [employer] access.    For providers and/or their staff who would like to refer a patient to [facility], please contact us through our one-stop-shop provider referral line, [facility], at [phone].    If you feel you have received this communication in error or would no longer like to receive these types of communications, please e-mail [email or web address]

## 2018-12-05 ENCOUNTER — PROCEDURE VISIT (OUTPATIENT)
Dept: UROLOGY | Facility: CLINIC | Age: 73
End: 2018-12-05
Payer: MEDICARE

## 2018-12-05 VITALS
WEIGHT: 171 LBS | HEART RATE: 72 BPM | SYSTOLIC BLOOD PRESSURE: 196 MMHG | BODY MASS INDEX: 23.94 KG/M2 | DIASTOLIC BLOOD PRESSURE: 92 MMHG | HEIGHT: 71 IN | TEMPERATURE: 97 F

## 2018-12-05 DIAGNOSIS — R39.13 SPLIT OF URINARY STREAM: ICD-10-CM

## 2018-12-05 DIAGNOSIS — R33.9 INCOMPLETE BLADDER EMPTYING: ICD-10-CM

## 2018-12-05 PROCEDURE — 51784 ANAL/URINARY MUSCLE STUDY: CPT | Mod: 26,HCNC,51, | Performed by: UROLOGY

## 2018-12-05 PROCEDURE — 52000 CYSTOURETHROSCOPY: CPT | Mod: HCNC,59,, | Performed by: UROLOGY

## 2018-12-05 PROCEDURE — 51741 ELECTRO-UROFLOWMETRY FIRST: CPT | Mod: 26,HCNC,51, | Performed by: UROLOGY

## 2018-12-05 PROCEDURE — 51728 CYSTOMETROGRAM W/VP: CPT | Mod: 26,HCNC,, | Performed by: UROLOGY

## 2018-12-05 PROCEDURE — 51797 INTRAABDOMINAL PRESSURE TEST: CPT | Mod: 26,HCNC,, | Performed by: UROLOGY

## 2018-12-05 RX ORDER — AMOXICILLIN 500 MG/1
500 CAPSULE ORAL
Status: COMPLETED | OUTPATIENT
Start: 2018-12-05 | End: 2018-12-05

## 2018-12-05 RX ADMIN — AMOXICILLIN 500 MG: 500 CAPSULE ORAL at 04:12

## 2018-12-05 NOTE — PATIENT INSTRUCTIONS
SIMPLE URODYNAMIC STUDY (SUDS) & CYSTOSCOPY  UROLOGY CLINIC DISCHARGE INSTRUCTIONS    You have had a procedure that will require time to properly heal. Follow the instructions you have been given on how to care for yourself once you are home. Below is additional information to help in your recovery.    ACTIVITY  · There are no restrictions in activity. Start doing again the things you did before the procedure.  · You may experience a slight burning sensation. You may notice a small amount of blood in your urine. This will clear up within a day. Call the clinic if this continues beyond 48 hours.    DIET  · Continue your normal diet. You may eat the same foods you ate before your procedure.  · Drink plenty of fluids during the first 24-48 hours following your procedure.    MEDICATIONS  · Resume all other previous medications from your prescribing physician.  · Continue any pre=procedure antibiotics until they are all gone.    SIGNS AND SYMPTOMS TO REPORT TO THE DOCTOR  · Chills or fever greater than 101° F within 24 hours of procedure.  · Changes in urination, such as increased bleeding, foul smell, cloudy urine, or painful urination.  · Call your doctor with any questions or concerns.    For any emergency situation, call 911 immediately or go to your nearest emergency room.    [facility]  [phone]    _                                                                                                                                                                                             If any problems after hours or weekends, you may call [phone] and ask for the urology resident on call.  Transurethral Resection of the Prostate (TURP)     Excess prostate tissue is removed during a TURP to let urine flow freely through the urethra.   TURP is surgery to treat a benign enlargement of the prostate, or BPH (benign prostatic hyperplasia). This surgery removes prostate tissue to relieve pressure on the  urethra. This helps relieve symptoms, such as:  · Urinary obstruction  · Frequent urination  · Decreased urinary stream  TURP is the most common procedure for the treatment of BPH. But certain other procedures also help relieve BPH symptoms. Your health care provider may do one of these instead of TURP. They include TUIP (transurethral incision of the prostate), TUNA (transurethral needle ablation), or laser ablation. If you will have one of these procedures, your healthcare provider can tell you more about it. Your preparation and experience during surgery will be similar to TURP.   Preparing for surgery  Your healthcare provider will tell you how to prepare for your procedure. For instance, you may be asked to stop taking certain medicines a few days before the procedure. You may be asked not to eat or drink anything after the midnight before surgery. Be sure to follow any special instructions youre given.  During the TURP procedure  · You will be given medicine (anesthesia) to keep you from feeling pain during the procedure. It may be given into your spine (epidural). This is not meant to put you to sleep, but it will numb the area where the surgery is being done. In some cases, general anesthesia is used. This is to keep you sleeping throughout the surgery. The anesthesia provider (anesthesiologist or nurse anesthetist) will talk to you about the pain medicine that is best for you.  · The surgeon inserts a cystoscope (a thin, telescope-like device) into your urethra. This device lets him or her see the blocked part of the urethra.  · A cutting device is inserted through the cystoscope. This is used to remove the excess prostate tissue. The cut pieces of tissue collect in the bladder. These pieces are continuously washed away with fluids during the procedure.   · The tissue pieces are sent to the lab to be sure they are free of cancer.   Possible risks and complications of prostate  procedures  · Bleeding  · Infection  · Scarring of the urethra  · Retrograde ejaculation  · Erectile dysfunction (rare)  · Absorption of fluid during the procedure (TURP syndrome)  · Permanent incontinence (very rare)   Retrograde ejaculation  After some surgical treatments, semen may travel into the bladder instead of out of the penis during ejaculation. This side effect is called retrograde ejaculation. As a result, there may be little or no semen when you ejaculate, which can result in infertility. If you are planning to have children, talk to your healthcare provider before having the TURP procedure. Otherwise, this is not harmful to your bladder, and the feeling or orgasm and your erection won't change. Retrograde ejaculation can also be a side effect of certain medicines.  Date Last Reviewed: 1/1/2017 © 2000-2017 [employer]. [address] [place] [address], [place] [address]. All rights reserved. This information is not intended as a substitute for professional medical care. Always follow your healthcare professional's instructions.

## 2018-12-06 NOTE — PROCEDURES
Procedures     Urodynamic Report    Indication:  Split stream     Patient was taken to the Urodynamic Suite with a comfortably full bladder and asked to perform a free uroflow.  Next, the patient was prepped and the urinary residual was drained with a 14 Fr catheter.  A 7 Fr dual lumen catheter was placed to measure intravesical pressures.  A 10 Fr balloon manometer was placed into the rectum for abdominal pressure measurements.  Patch EMG electrodes were placed on the perineum.  The patient was connected to the [employer] Urodynamic machine, using a multichannel technique, the data were interpreted.  The bladder was filled with sterile water at room temperature at a rate of 30 ml/min.  Patient is filled to urgency.  Filling is performed with the patient in the seated position.  The patient was then asked to sit and void for a pressure flow study.    The following are the results of the study:  1.  Uroflow       Q max:  56.1 ml/sec       Voided volume:  284.2 ml       Pattern of the curve:  continuous    2.  PVR:  100 ml    3.  CMG       Sensation:         First Desire:  168.8 ml         Normal Desire:  191.8 ml         Strong Desire:  281.3 ml         Urgency:  322.4 ml       Capacity:  454.3 ml       Abnormal Contractions:  none       Compliance:  normal    4.  EMG:  Normal guarding reflex, relaxed with voiding    5.  Voiding phase       Q max:  26 ml/sec       P det at Q max:  75.7 cm h2O       Pattern of the curve:  Continuous, attenuated       Voided volume:  404 ml       PVR:  50 ml    6.  Analysis:  Normal sensation and capacity, obstructed voiding    7.  Recommendations:       A.  Has a very large median lobe on cystoscopy, recommended TURP       B.  Risks and benefits discussed       C.  He will think about it and call Luan to schedule    CYSTOSCOPY REPORT    Pre Procedure Diagnosis:  Split urinary stream, obstructed voiding    Post Procedure Diagnosis:  Large median lobe, SVML 4 cm     Anesthesia: 10 cc 2%  lidocaine jelly applied per urethra.    14 FR Flexible Olympus cystoscope used.    FINDINGS:  Dome, anterior, posterior, lateral walls and bladder base free of urothelial abnormalities. Right and left ureteral orifices in the normal postion and configuration could not be seen except when the scope was retroflexed due to the median lobe of the prostate.  SVML 4 cm, urethra was normal.    Specimen:  none    The patient was taken to the cystoscopy suite and placed in supine position.  The genitalia was prepped and draped  in the usual sterile fashion.  Two percent lidocaine jelly was inserted in the urethra and held in place with a penile clamp.  After sufficent time had passed to allow good local anesthesia, the cystoscope was inserted in the urethra and passed into the bladder visualizing the urethra along its entire course.  The dome, anterior, posterior and lateral walls were examined systematically.  The ureteral orifices were in their usual position and configuration.  The cystoscope was turned upon itself 180 degrees to visualize the bladder neck.  The cystoscope was then brought to the level of the bladder neck, the water was turned on and the prostate was visualized.  The cystoscope was removed and the patient was instructed to urinate prior to leaving the office.     Post procedure medication:  Amoxicillin 500 mg x 1     ASSESSMENT/PLAN:  72 year old man status post flexible cystoscopy.  1. Push fluids for 24 hours.  2. May see blood in the urine, this should gradually improve over the next 2-3 days.  3. The patient was instructed to return to the office or go to the emergency should fever, chills, cloudy urine, or inability to urinate develop.  4. Follow up pending schedule of TURP.

## 2018-12-29 ENCOUNTER — HOSPITAL ENCOUNTER (EMERGENCY)
Facility: HOSPITAL | Age: 73
Discharge: HOME | End: 2018-12-29
Attending: EMERGENCY MEDICINE
Payer: MEDICARE

## 2018-12-29 VITALS
RESPIRATION RATE: 18 BRPM | OXYGEN SATURATION: 99 % | HEART RATE: 86 BPM | BODY MASS INDEX: 23.8 KG/M2 | DIASTOLIC BLOOD PRESSURE: 88 MMHG | TEMPERATURE: 99 F | HEIGHT: 71 IN | SYSTOLIC BLOOD PRESSURE: 192 MMHG | WEIGHT: 170 LBS

## 2018-12-29 DIAGNOSIS — M54.2 NECK PAIN: ICD-10-CM

## 2018-12-29 DIAGNOSIS — M62.838 TRAPEZIUS MUSCLE SPASM: Primary | ICD-10-CM

## 2018-12-29 DIAGNOSIS — R52 PAIN: ICD-10-CM

## 2018-12-29 DIAGNOSIS — S29.012A RHOMBOID MUSCLE STRAIN, INITIAL ENCOUNTER: ICD-10-CM

## 2018-12-29 PROCEDURE — 99284 EMERGENCY DEPT VISIT MOD MDM: CPT | Mod: ,,, | Performed by: PHYSICIAN ASSISTANT

## 2018-12-29 PROCEDURE — 93010 EKG 12-LEAD: ICD-10-PCS | Mod: HCNC,,, | Performed by: INTERNAL MEDICINE

## 2018-12-29 PROCEDURE — 93005 ELECTROCARDIOGRAM TRACING: CPT | Mod: HCNC,59

## 2018-12-29 PROCEDURE — 99284 PR EMERGENCY DEPT VISIT,LEVEL IV: ICD-10-PCS | Mod: ,,, | Performed by: PHYSICIAN ASSISTANT

## 2018-12-29 PROCEDURE — 25000003 PHARM REV CODE 250: Mod: HCNC | Performed by: PHYSICIAN ASSISTANT

## 2018-12-29 PROCEDURE — 20553 NJX 1/MLT TRIGGER POINTS 3/>: CPT

## 2018-12-29 PROCEDURE — 99284 EMERGENCY DEPT VISIT MOD MDM: CPT | Mod: 25,HCNC

## 2018-12-29 PROCEDURE — 93010 ELECTROCARDIOGRAM REPORT: CPT | Mod: HCNC,,, | Performed by: INTERNAL MEDICINE

## 2018-12-29 RX ORDER — LIDOCAINE HYDROCHLORIDE 10 MG/ML
5 INJECTION, SOLUTION EPIDURAL; INFILTRATION; INTRACAUDAL; PERINEURAL
Status: COMPLETED | OUTPATIENT
Start: 2018-12-29 | End: 2018-12-29

## 2018-12-29 RX ORDER — TIZANIDINE 4 MG/1
4 TABLET ORAL EVERY 6 HOURS PRN
Qty: 40 TABLET | Refills: 0 | Status: SHIPPED | OUTPATIENT
Start: 2018-12-29 | End: 2019-01-08

## 2018-12-29 RX ADMIN — LIDOCAINE HYDROCHLORIDE 50 MG: 10 INJECTION, SOLUTION EPIDURAL; INFILTRATION; INTRACAUDAL; PERINEURAL at 03:12
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
## 2018-12-29 NOTE — PROVIDER PROGRESS NOTES - EMERGENCY DEPT.
Encounter Date: 12/29/2018    ED Physician Progress Notes        Physician Note:   New TWI V5, V6    EKG - STEMI Decision  Initial Reading: No STEMI present.
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
## 2019-01-16 ENCOUNTER — OFFICE VISIT (OUTPATIENT)
Dept: CARDIOTHORACIC SURGERY | Facility: CLINIC | Age: 74
End: 2019-01-16
Attending: THORACIC SURGERY (CARDIOTHORACIC VASCULAR SURGERY)
Payer: MEDICARE

## 2019-01-16 ENCOUNTER — HOSPITAL ENCOUNTER (OUTPATIENT)
Dept: RADIOLOGY | Facility: HOSPITAL | Age: 74
Discharge: HOME | End: 2019-01-16
Attending: THORACIC SURGERY (CARDIOTHORACIC VASCULAR SURGERY)
Payer: MEDICARE

## 2019-01-16 VITALS
WEIGHT: 178.13 LBS | BODY MASS INDEX: 24.94 KG/M2 | HEIGHT: 71 IN | SYSTOLIC BLOOD PRESSURE: 163 MMHG | DIASTOLIC BLOOD PRESSURE: 91 MMHG | OXYGEN SATURATION: 99 % | HEART RATE: 72 BPM

## 2019-01-16 DIAGNOSIS — Z87.09 HISTORY OF PNEUMOTHORAX: ICD-10-CM

## 2019-01-16 DIAGNOSIS — R91.1 LUNG NODULE: ICD-10-CM

## 2019-01-16 PROCEDURE — 99213 PR OFFICE/OUTPT VISIT, EST, LEVL III, 20-29 MIN: ICD-10-PCS | Mod: HCNC,S$GLB,, | Performed by: THORACIC SURGERY (CARDIOTHORACIC VASCULAR SURGERY)

## 2019-01-16 PROCEDURE — 99999 PR PBB SHADOW E&M-EST. PATIENT-LVL III: ICD-10-PCS | Mod: PBBFAC,HCNC,, | Performed by: THORACIC SURGERY (CARDIOTHORACIC VASCULAR SURGERY)

## 2019-01-16 PROCEDURE — 3080F PR MOST RECENT DIASTOLIC BLOOD PRESSURE >= 90 MM HG: ICD-10-PCS | Mod: CPTII,HCNC,S$GLB, | Performed by: THORACIC SURGERY (CARDIOTHORACIC VASCULAR SURGERY)

## 2019-01-16 PROCEDURE — 99999 PR PBB SHADOW E&M-EST. PATIENT-LVL III: CPT | Mod: PBBFAC,HCNC,, | Performed by: THORACIC SURGERY (CARDIOTHORACIC VASCULAR SURGERY)

## 2019-01-16 PROCEDURE — 3077F SYST BP >= 140 MM HG: CPT | Mod: CPTII,HCNC,S$GLB, | Performed by: THORACIC SURGERY (CARDIOTHORACIC VASCULAR SURGERY)

## 2019-01-16 PROCEDURE — 71250 CT THORAX DX C-: CPT | Mod: 26,HCNC,, | Performed by: RADIOLOGY

## 2019-01-16 PROCEDURE — 1101F PR PT FALLS ASSESS DOC 0-1 FALLS W/OUT INJ PAST YR: ICD-10-PCS | Mod: CPTII,HCNC,S$GLB, | Performed by: THORACIC SURGERY (CARDIOTHORACIC VASCULAR SURGERY)

## 2019-01-16 PROCEDURE — 3080F DIAST BP >= 90 MM HG: CPT | Mod: CPTII,HCNC,S$GLB, | Performed by: THORACIC SURGERY (CARDIOTHORACIC VASCULAR SURGERY)

## 2019-01-16 PROCEDURE — 99499 UNLISTED E&M SERVICE: CPT | Mod: HCNC,S$GLB,, | Performed by: THORACIC SURGERY (CARDIOTHORACIC VASCULAR SURGERY)

## 2019-01-16 PROCEDURE — 71250 CT THORAX DX C-: CPT | Mod: TC,HCNC

## 2019-01-16 PROCEDURE — 1101F PT FALLS ASSESS-DOCD LE1/YR: CPT | Mod: CPTII,HCNC,S$GLB, | Performed by: THORACIC SURGERY (CARDIOTHORACIC VASCULAR SURGERY)

## 2019-01-16 PROCEDURE — 99499 RISK ADDL DX/OHS AUDIT: ICD-10-PCS | Mod: HCNC,S$GLB,, | Performed by: THORACIC SURGERY (CARDIOTHORACIC VASCULAR SURGERY)

## 2019-01-16 PROCEDURE — 3077F PR MOST RECENT SYSTOLIC BLOOD PRESSURE >= 140 MM HG: ICD-10-PCS | Mod: CPTII,HCNC,S$GLB, | Performed by: THORACIC SURGERY (CARDIOTHORACIC VASCULAR SURGERY)

## 2019-01-16 PROCEDURE — 99213 OFFICE O/P EST LOW 20 MIN: CPT | Mod: HCNC,S$GLB,, | Performed by: THORACIC SURGERY (CARDIOTHORACIC VASCULAR SURGERY)

## 2019-01-16 PROCEDURE — 71250 CT CHEST WITHOUT CONTRAST: ICD-10-PCS | Mod: 26,HCNC,, | Performed by: RADIOLOGY

## 2019-01-16 RX ORDER — CYCLOBENZAPRINE HCL 10 MG
TABLET ORAL
Refills: 2 | COMMUNITY
Start: 2019-01-11 | End: 2019-04-03 | Stop reason: CLARIF

## 2019-01-16 NOTE — PROGRESS NOTES
Subjective:       Patient ID: Herminio Burger is a 73 y.o. male.    Chief Complaint: Follow-up    Diagnosis:  PET avid GGO with soft tissue component, presumed lung cancer    Pre-operative therapy: none    Procedure(s) and date(s):  SBRT on 8/3/18, 60Gy given in 8 fractions    Pathology: none     Post-treatment therapy: surveillance     HPI   73 y.o. male with HTN, COPD, spontaneous pneumothorax s/p blebectomy and colon perforation returns 6 month follow up for left upper lobe PET avid mass which abutted the thoracic aorta. In March 2018, he saw his PCP for URI and got a low dose lung cancer screening chest CT which revealed a solid and ground glass mass that abuts the aorta. Initially it was determined nodule is too high risk for biopsy thus short interval surveillance was recommended. After mass persisted, patient was presented at multidisciplinary thoracic tumor board and consensus from the meeting was to proceed with SBRT.  The decision was based upon the lack of a plane between the spiculated lung mass and the thoracic aorta as well as the PET avidity. He completed SBRT on 8/3/18, 60Gy given in 8 fractions. Repeat PET in November showed resolution of PET avidity in left lung. Today he denies difficulty with daily activities. Chronic dry cough which is occasional productive. Denies fever, chills, SOB, weight loss, cough, decreased appetite, N/V or changes in bowel and bladder functioning.       Patient has a complicated surgical history starting in February 2014 when he presented to ED with spontaneous pneumothorax. Underwent a right VATS on 2/27/14 for apical segmentectomy/blebectomy. Hospital course complicated by spontaneous colon perforation requiring lapaorotomy for ileostomy, wound vac placement, and redo laparotomy for closure of ileostomy, cholecystectomy and repair of ventral hernia in November 2014. Former smoker. Quit in 2014. 50 pack year history. Denies EtOH use.      Review of Systems  "  Constitutional: Negative for activity change, appetite change, fatigue, fever and unexpected weight change.   Respiratory: Positive for cough. Negative for shortness of breath.    Cardiovascular: Negative for chest pain and leg swelling.   Gastrointestinal: Negative for abdominal pain, nausea and vomiting.   Genitourinary: Negative.    Musculoskeletal: Negative.    Skin: Negative for color change and rash.   Neurological: Negative for dizziness and syncope.   Psychiatric/Behavioral: Negative for agitation. The patient is not nervous/anxious.          Objective:       Vitals:    01/16/19 0922   BP: (!) 163/91   Pulse: 72   SpO2: 99%   Weight: 80.8 kg (178 lb 2.1 oz)   Height: 5' 11" (1.803 m)   PainSc: 0-No pain       Physical Exam   Constitutional: He is oriented to person, place, and time. He appears well-developed and well-nourished.   HENT:   Head: Normocephalic and atraumatic.   Eyes: EOM are normal.   Neck: Normal range of motion. Neck supple. No tracheal deviation present.   Cardiovascular: Normal rate, regular rhythm, normal heart sounds and intact distal pulses.   Pulmonary/Chest: Effort normal and breath sounds normal. He has no wheezes. He has no rales.   Abdominal: Soft.   Musculoskeletal: Normal range of motion. He exhibits no edema.   Neurological: He is alert and oriented to person, place, and time.   Skin: Skin is warm and dry.   Psychiatric: He has a normal mood and affect. Thought content normal.   Vitals reviewed.        11/19/18- PET- Left upper lobe nodule has completely resolved and there is no measurable disease on today's study and no local or distant metastases    1/16/19- Chest CT-     Assessment:       73 y.o. male with HTN, COPD, spontaneous pneumothorax s/p blebectomy here today for 6 month follow up s/p SBRT to PET avid RUL mass.     Plan:       RTC in 6 months with chest CT  "

## 2019-02-02 ENCOUNTER — OFFICE VISIT (OUTPATIENT)
Dept: INTERNAL MEDICINE | Facility: CLINIC | Age: 74
End: 2019-02-02
Payer: MEDICARE

## 2019-02-02 VITALS
DIASTOLIC BLOOD PRESSURE: 96 MMHG | HEIGHT: 71 IN | OXYGEN SATURATION: 99 % | WEIGHT: 177.94 LBS | SYSTOLIC BLOOD PRESSURE: 160 MMHG | HEART RATE: 73 BPM | BODY MASS INDEX: 24.91 KG/M2

## 2019-02-02 DIAGNOSIS — I10 ESSENTIAL HYPERTENSION: Primary | ICD-10-CM

## 2019-02-02 PROCEDURE — 99999 PR PBB SHADOW E&M-EST. PATIENT-LVL III: ICD-10-PCS | Mod: PBBFAC,HCNC,, | Performed by: INTERNAL MEDICINE

## 2019-02-02 PROCEDURE — 99213 OFFICE O/P EST LOW 20 MIN: CPT | Mod: HCNC,S$GLB,, | Performed by: INTERNAL MEDICINE

## 2019-02-02 PROCEDURE — 3077F SYST BP >= 140 MM HG: CPT | Mod: HCNC,CPTII,S$GLB, | Performed by: INTERNAL MEDICINE

## 2019-02-02 PROCEDURE — 3080F PR MOST RECENT DIASTOLIC BLOOD PRESSURE >= 90 MM HG: ICD-10-PCS | Mod: HCNC,CPTII,S$GLB, | Performed by: INTERNAL MEDICINE

## 2019-02-02 PROCEDURE — 99999 PR PBB SHADOW E&M-EST. PATIENT-LVL III: CPT | Mod: PBBFAC,HCNC,, | Performed by: INTERNAL MEDICINE

## 2019-02-02 PROCEDURE — 3077F PR MOST RECENT SYSTOLIC BLOOD PRESSURE >= 140 MM HG: ICD-10-PCS | Mod: HCNC,CPTII,S$GLB, | Performed by: INTERNAL MEDICINE

## 2019-02-02 PROCEDURE — 3080F DIAST BP >= 90 MM HG: CPT | Mod: HCNC,CPTII,S$GLB, | Performed by: INTERNAL MEDICINE

## 2019-02-02 PROCEDURE — 99213 PR OFFICE/OUTPT VISIT, EST, LEVL III, 20-29 MIN: ICD-10-PCS | Mod: HCNC,S$GLB,, | Performed by: INTERNAL MEDICINE

## 2019-02-02 PROCEDURE — 1101F PR PT FALLS ASSESS DOC 0-1 FALLS W/OUT INJ PAST YR: ICD-10-PCS | Mod: HCNC,CPTII,S$GLB, | Performed by: INTERNAL MEDICINE

## 2019-02-02 PROCEDURE — 1101F PT FALLS ASSESS-DOCD LE1/YR: CPT | Mod: HCNC,CPTII,S$GLB, | Performed by: INTERNAL MEDICINE

## 2019-02-02 RX ORDER — AMLODIPINE BESYLATE 5 MG/1
5 TABLET ORAL DAILY
Qty: 30 TABLET | Refills: 11 | Status: SHIPPED | OUTPATIENT
Start: 2019-02-02 | End: 2019-02-12
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
## 2019-02-12 ENCOUNTER — OFFICE VISIT (OUTPATIENT)
Dept: INTERNAL MEDICINE | Facility: CLINIC | Age: 74
End: 2019-02-12
Payer: MEDICARE

## 2019-02-12 VITALS
OXYGEN SATURATION: 98 % | WEIGHT: 176.13 LBS | HEART RATE: 64 BPM | DIASTOLIC BLOOD PRESSURE: 80 MMHG | BODY MASS INDEX: 24.66 KG/M2 | HEIGHT: 71 IN | SYSTOLIC BLOOD PRESSURE: 120 MMHG

## 2019-02-12 DIAGNOSIS — H54.7 VISUAL IMPAIRMENT: ICD-10-CM

## 2019-02-12 DIAGNOSIS — R91.1 LUNG NODULE: ICD-10-CM

## 2019-02-12 DIAGNOSIS — C34.92 MALIGNANT NEOPLASM OF LEFT LUNG, UNSPECIFIED PART OF LUNG: ICD-10-CM

## 2019-02-12 DIAGNOSIS — I70.0 ATHEROSCLEROSIS OF AORTA: ICD-10-CM

## 2019-02-12 DIAGNOSIS — Z87.19 HISTORY OF SMALL BOWEL OBSTRUCTION: ICD-10-CM

## 2019-02-12 DIAGNOSIS — I10 ESSENTIAL HYPERTENSION: Primary | ICD-10-CM

## 2019-02-12 DIAGNOSIS — J44.9 CHRONIC OBSTRUCTIVE PULMONARY DISEASE, UNSPECIFIED COPD TYPE: ICD-10-CM

## 2019-02-12 PROBLEM — I77.9 BILATERAL CAROTID ARTERY DISEASE: Status: RESOLVED | Noted: 2018-06-06 | Resolved: 2019-02-12

## 2019-02-12 PROCEDURE — 3079F DIAST BP 80-89 MM HG: CPT | Mod: CPTII,S$GLB,, | Performed by: INTERNAL MEDICINE

## 2019-02-12 PROCEDURE — 99499 RISK ADDL DX/OHS AUDIT: ICD-10-PCS | Mod: HCNC,S$GLB,, | Performed by: INTERNAL MEDICINE

## 2019-02-12 PROCEDURE — 99214 OFFICE O/P EST MOD 30 MIN: CPT | Mod: S$GLB,,, | Performed by: INTERNAL MEDICINE

## 2019-02-12 PROCEDURE — 3074F PR MOST RECENT SYSTOLIC BLOOD PRESSURE < 130 MM HG: ICD-10-PCS | Mod: CPTII,S$GLB,, | Performed by: INTERNAL MEDICINE

## 2019-02-12 PROCEDURE — 3074F SYST BP LT 130 MM HG: CPT | Mod: CPTII,S$GLB,, | Performed by: INTERNAL MEDICINE

## 2019-02-12 PROCEDURE — 99999 PR PBB SHADOW E&M-EST. PATIENT-LVL IV: ICD-10-PCS | Mod: PBBFAC,HCNC,, | Performed by: INTERNAL MEDICINE

## 2019-02-12 PROCEDURE — 99999 PR PBB SHADOW E&M-EST. PATIENT-LVL IV: CPT | Mod: PBBFAC,HCNC,, | Performed by: INTERNAL MEDICINE

## 2019-02-12 PROCEDURE — 99214 PR OFFICE/OUTPT VISIT, EST, LEVL IV, 30-39 MIN: ICD-10-PCS | Mod: S$GLB,,, | Performed by: INTERNAL MEDICINE

## 2019-02-12 PROCEDURE — 3079F PR MOST RECENT DIASTOLIC BLOOD PRESSURE 80-89 MM HG: ICD-10-PCS | Mod: CPTII,S$GLB,, | Performed by: INTERNAL MEDICINE

## 2019-02-12 PROCEDURE — 1101F PR PT FALLS ASSESS DOC 0-1 FALLS W/OUT INJ PAST YR: ICD-10-PCS | Mod: CPTII,S$GLB,, | Performed by: INTERNAL MEDICINE

## 2019-02-12 PROCEDURE — 1101F PT FALLS ASSESS-DOCD LE1/YR: CPT | Mod: CPTII,S$GLB,, | Performed by: INTERNAL MEDICINE

## 2019-02-12 PROCEDURE — 99499 UNLISTED E&M SERVICE: CPT | Mod: HCNC,S$GLB,, | Performed by: INTERNAL MEDICINE

## 2019-02-12 RX ORDER — AMLODIPINE BESYLATE 5 MG/1
5 TABLET ORAL DAILY
Qty: 90 TABLET | Refills: 11 | Status: SHIPPED | OUTPATIENT
Start: 2019-02-12 | End: 2020-01-16

## 2019-02-12 NOTE — PROGRESS NOTES
Subjective:       Patient ID: Herminio Burger is a 73 y.o. male.    Chief Complaint: Annual Exam (pressure has been running high, requesting refferal to opthamologist)    Patient is here for followup for chronic conditions.    Has been having diandra pressures, given med with losartan. Taking with good effect.    Homes pressures 150s over 90s, sometimes 80s.    Needs to see eye doctor -- itchy, watery, some R blurry.    Urine flow btr.    Still some mid thigh L sided pains, not worse. No hernia seen by urologist.        Review of Systems   Constitutional: Negative for activity change, fever and unexpected weight change.   Respiratory: Positive for cough (chronic) and wheezing. Negative for chest tightness.    Cardiovascular: Negative for chest pain, palpitations and leg swelling.   Gastrointestinal: Negative for abdominal distention, abdominal pain, nausea and vomiting.   Genitourinary: Negative for decreased urine volume, difficulty urinating and scrotal swelling.   Musculoskeletal: Negative for joint swelling.   Skin: Negative for rash and wound.   Neurological: Negative for tremors, syncope and weakness.   Psychiatric/Behavioral: Negative for confusion.       Objective:      Physical Exam   Constitutional: He is oriented to person, place, and time. He appears well-developed and well-nourished. No distress.   HENT:   Head: Normocephalic and atraumatic.   Mouth/Throat: No oropharyngeal exudate.   Eyes: EOM are normal. Pupils are equal, round, and reactive to light. No scleral icterus.   Neck: Normal range of motion. Neck supple. No thyromegaly present.   Cardiovascular: Normal rate, regular rhythm and normal heart sounds. Exam reveals no gallop and no friction rub.   No murmur heard.  Pulmonary/Chest: Effort normal. No respiratory distress. He has wheezes. He has no rales.   Moderate wheeze   Abdominal: Soft. Bowel sounds are normal. He exhibits no distension and no mass. There is no tenderness. There is no rebound and  no guarding.   Multiple abd scars     Musculoskeletal: Normal range of motion. He exhibits no edema.   L hip mildly decreased rom, somewhat painful flexion at the hip with posterior thigh tenderness.  Gait is normal.   Lymphadenopathy:     He has no cervical adenopathy.   Neurological: He is alert and oriented to person, place, and time.   Skin: No lesion noted. He is not diaphoretic.   Psychiatric: He has a normal mood and affect. His behavior is normal. Thought content normal.       Assessment:       1. Essential hypertension    2. Chronic obstructive pulmonary disease, unspecified COPD type    3. Lung nodule    4. History of small bowel obstruction    5. Visual impairment        Plan:       Herminio was seen today for annual exam.    Diagnoses and all orders for this visit:    Essential hypertension  -     amLODIPine (NORVASC) 5 MG tablet; Take 1 tablet (5 mg total) by mouth once daily.  Controlled, continue med    Chronic obstructive pulmonary disease, unspecified COPD type  stable    Lung nodule  Hx of cancer, last PET lesion treated with XRT    History of small bowel obstruction  No recent issues    Visual impairment  -     Ambulatory Referral to Optometry  Patient Instructions   You can try over the counter saline eye drops or artifical tear eye drops.        Atherosclerosis of aorta -- has BP control, remain off ASA for now since he had been having hemoptysis. Hold on statin as per his request.    Thigh pains could be from hip or known lumbar arthritis, he will call if symptoms worsen, have been stable    Health Maintenance       Date Due Completion Date    TETANUS VACCINE 12/24/1961 ---    High Dose Statin 12/24/1964 ---    Zoster Vaccine 12/24/2003 ---    Lipid Panel 03/06/2023 3/6/2018    Colonoscopy 10/06/2024 10/6/2014      Offered statin, pt declines.    Follow-up in about 6 months (around 8/12/2019).    Future Appointments   Date Time Provider Department Center   3/18/2019 11:00 AM Diana Daniel OD  [facility]

## 2019-03-18 ENCOUNTER — OFFICE VISIT (OUTPATIENT)
Dept: OPTOMETRY | Facility: CLINIC | Age: 74
End: 2019-03-18
Payer: COMMERCIAL

## 2019-03-18 DIAGNOSIS — H35.033 HYPERTENSIVE RETINOPATHY OF BOTH EYES: ICD-10-CM

## 2019-03-18 DIAGNOSIS — H02.889 MEIBOMIAN GLAND DYSFUNCTION: ICD-10-CM

## 2019-03-18 DIAGNOSIS — H25.13 NUCLEAR SCLEROTIC CATARACT OF BOTH EYES: Primary | ICD-10-CM

## 2019-03-18 DIAGNOSIS — H52.7 REFRACTIVE ERROR: ICD-10-CM

## 2019-03-18 PROCEDURE — 99999 PR PBB SHADOW E&M-EST. PATIENT-LVL III: CPT | Mod: PBBFAC,,, | Performed by: OPTOMETRIST

## 2019-03-18 PROCEDURE — 99499 RISK ADDL DX/OHS AUDIT: ICD-10-PCS | Mod: S$GLB,,, | Performed by: OPTOMETRIST

## 2019-03-18 PROCEDURE — 92014 PR EYE EXAM, EST PATIENT,COMPREHESV: ICD-10-PCS | Mod: S$GLB,,, | Performed by: OPTOMETRIST

## 2019-03-18 PROCEDURE — 92014 COMPRE OPH EXAM EST PT 1/>: CPT | Mod: S$GLB,,, | Performed by: OPTOMETRIST

## 2019-03-18 PROCEDURE — 99999 PR PBB SHADOW E&M-EST. PATIENT-LVL III: ICD-10-PCS | Mod: PBBFAC,,, | Performed by: OPTOMETRIST

## 2019-03-18 PROCEDURE — 99499 UNLISTED E&M SERVICE: CPT | Mod: S$GLB,,, | Performed by: OPTOMETRIST

## 2019-03-18 NOTE — PATIENT INSTRUCTIONS
CATARACT    Symptoms and Signs:  A cataract starts out small, and at first has little effect on your vision. You may notice that your vision is blurred a little, like looking through a cloudy piece of glass or viewing an impressionist painting. A cataract may make light from the sun or a lamp seem too bright or glaring. Or you may notice when you drive at night that the oncoming headlights cause more glare than before. Colors may not appear as bright as they once did.  The type of cataract you have will affect exactly which symptoms you experience and how soon they will occur. When a nuclear cataract first develops it can bring about a temporary improvement in your near vision, called second sight. Unfortunately, the improved vision is short-lived and will disappear as the cataract worsens. Meanwhile, a sub-capsular cataract may not produce any symptoms until it's well-developed.    Causes:  No one knows for sure why the eye's lens changes as we age, forming cataracts. Researchers are gradually identifying factors that may cause cataracts - and information that may help to prevent them.  Many studies suggest that exposure to ultraviolet light is associated with cataracts, so eye care practitioners recommend wearing sunglasses and a wide-brimmed hat to lessen your exposure.  Other studies suggest people with diabetes are at risk for developing a cataract.   Some eye care practitioners believe that a diet high in antioxidants, such as beta-carotene (vitamin A), selenium and vitamins C and E, may forestall cataracts.  The most important of these is probably vitamin C; it might be helpful to supplement the diet with an extra Vitamin C tablet.  Meanwhile, eating a lot of salt may increase your risk.  Other risk factors include cigarette smoke, air pollution and heavy alcohol consumption.  We simply recommend that you be careful to use sunglasses and to take Vitamin C.    Treatment:  When symptoms begin to appear, we can  improve your vision for a while using new glasses, strong bifocals, magnification, appropriate lighting or other visual aids.  This is true in your case; your cataract does not impact your vision very much at this time. If you experience any of the symptoms we described you can return at any time. Otherwise it is fine to see you in 1 year.     ==============================================      Tips for Using Less Salt  Most people with heart problems need to eat less salt (sodium). Reducing the amount of salt you eat may help control your blood pressure. The higher your blood pressure, the greater your risk for heart disease, stroke, blindness, and kidney problems.    At the store  · Make low-salt choices by reading labels carefully. Look for the total amount of sodium per serving.  · Use more fresh food. Buy more fruits and vegetables. Select lean meats, fish, and poultry.  · Use fewer frozen, canned, and packaged foods which often contain a lot of sodium.  · Use plain frozen vegetables without sauces or toppings. These products are often low- or no-sodium.  · Opt for reduced-sodium or no-salt-added versions of canned vegetables and soups.  In the kitchen  · Don't add salt to food when you're cooking. Season with flavorings such as onion, garlic, pepper, salt-free herbal blends, and lemon or lime juice.  · Use a cookbook containing low-salt recipes. It can give you ideas for tasty meals that are healthy for your heart.  · Sprinkle salt-free herbal blends on vegetables and meat.  · Drain and rinse canned foods, such as canned beans and vegetables, before cooking or eating.  Eating out  · Tell the  you're on a low-salt diet. Ask questions about the menu.  · Order fish, chicken, and meat broiled, baked, poached, or grilled without salt, butter, or breading.  · Use lemon, pepper, and salt-free herb mixes to add flavor.  · Choose plain steamed rice, boiled noodles, and baked or boiled potatoes. Top potatoes with  chives and a little sour cream.     Beware! Salt goes by many other names. Limit foods with these words listed as ingredients: salt, sodium, soy sauce, baking soda, baking powder, MSG, monosodium, Na (the chemical symbol for sodium). Some antacids are also high in salt. © 7306-8711 [employer]. [address] [place] [address], [place] [address]. All rights reserved. This information is not intended as a substitute for professional medical care. Always follow your healthcare professional's instructions.           Low-Salt Choices  Eating salt (sodium) can make your body retain too much water. Excess water makes your heart work harder. Canned, packaged, and frozen foods are easy to prepare, but they are often high in sodium. Here are some ideas for low-salt foods you can easily prepare yourself.    For breakfast  · Fruit or 100% fruit juice  · Whole-wheat bread or an English muffin. Compare sodium content on labels.  · Low-fat milk or yogurt  · Unsalted eggs  · Shredded wheat  · Corn tortillas  · Unsalted steamed rice  · Regular (not instant) hot cereal, made without salt  Stay away from:  · Sausage, negro, and ham  · Flour tortillas  · Packaged muffins, pancakes, and biscuits  · Instant hot cereals  · Cottage cheese  For lunch and dinner  · Fresh fish, chicken, turkey, or meat--baked, broiled, or roasted without salt  · Dry beans, cooked without salt  · Tofu, stir-fried without salt  · Unsalted fresh fruit and vegetables, or frozen or canned fruit and vegetables with no added salt  Stay away from:  · Lunch or deli meat that is cured or smoked  · Cheese  · Tomato juice and catsup  · Canned vegetables, soups, and fish not labeled as no-salt-added or reduced sodium  · Packaged gravies and sauces  · Olives, pickles, and relish  · Bottled salad dressings  For snacks and desserts  · Yogurt  · Unsalted, air popped popcorn  · Unsalted nuts or seeds  Stay away from:  · Pies and cakes  · Packaged dessert  mixes  · Pizza  · Canned and packaged puddings  · Pretzels, chips, crackers, and nuts--unless the label says unsalted  © 20009869-7488 [employer]. [address] [place] [address], [place] [address]. All rights reserved. This information is not intended as a substitute for professional medical care. Always follow your healthcare professional's instructions.      Eating Heart-Healthy Food: Using the DASH Plan    Eating for your heart doesnt have to be hard or boring. You just need to know how to make healthier choices. The DASH eating plan has been developed to help you do just that. DASH stands for Dietary Approaches to Stop Hypertension. It is a plan that has been proven to be healthier for your heart and to lower your risk for high blood pressure. It can also help lower your risk for cancer, heart disease, osteoporosis, and diabetes.  Choosing from each food group  Choose foods from each of the food groups below each day. Try to get the recommended number of servings for each food group. The serving numbers are based on a diet of 2,000 calories a day. Talk to your doctor if youre unsure about your calorie needs. Along with getting the correct servings, the DASH plan also recommends a sodium intake less than 2,300 mg per day.          Grains  Servings: 6-8 a day  A serving is:  · 1 slice bread  · 1 ounce dry cereal  · Half a cup cooked rice, pasta or cereal  Best choices: Whole grains and any grains high in fiber.  Vegetables  Servings: 4-5 a day  A serving is:  · 1 cup raw leafy vegetable  · Half a cup cut-up raw or cooked vegetable  · Half a cup vegetable juice  Best choices: Fresh or frozen vegetables prepared without added salt or fat.    Fruits  Servings: 4-5 a day  A serving is:  · 1 medium fruit  · One-quarter cup dried fruit  · Half a cup fresh, frozen, or canned fruit  · Half a cup of 100% fruit juics  Best choices: A variety of fresh fruits of different colors. Whole fruits are a better choice than  fruit juices.  Low-fat or fat-free dairy  Servings: 2-3 a day  A serving is:  · 1 cup milk  · 1 cup yogurt  · One and a half ounces cheese  Best choices: Skim or 1% milk, low-fat or fat-free yogurt or buttermilk, and low-fat cheeses.            Lean meats, poultry, fish  Servings: 6 or fewer a day  A serving is:  · 1 ounce cooked meats, poultry, or fish  · 1 egg  Best choices: Lean poultry and fish. Trim away visible fat. Broil, grill, roast, or boil instead of frying. Remove skin from poultry before eating. Limit how much red meat you eat.   Nuts, seeds, beans  Servings: 4-5 a week  A serving is:  · One-third cup nuts (one and a half ounces)  · 2 tablespoons nut butter or seeds  · Half a cup cooked dry beans or legumes  Best choices: Dry roasted nuts with no salt added, lentils, kidney beans, garbanzo beans, and whole cobos beans.    Fats and oils  Servings: 2-3 a day  A serving is:  · 1 teaspoon vegetable oil  · 1 teaspoon soft margarine  · 1 tablespoon mayonnaise  · 2 tablespoons salad dressing  Best choices: Nut and vegetable oils (nontropical vegetable oils), such as olive and canola oil.  Sweets  Servings: 5 a week or fewer  A serving is:  · 1 tablespoon sugar, maple syrup, or honey  · 1 tablespoon jam or jelly  · 1 half-ounce jelly beans (about 15)  · 1 cup lemonade  Best choices: Dried fruit can be a satisfying sweet. Choose low-fat sweets. And watch your serving sizes!        For more on the DASH eating plan, visit:  www.nhlbi.nih.gov/health/health-topics/topics/dash    © 3128-4240 [employer]. [address] [place] [address]. All rights reserved. This information is not intended as a substitute for professional medical care. Always follow your healthcare professional's instructions.     ==============================================    MEIBOMITIS    Your eyes look dry today. Your eyes are dry not because you're missing the watery portion of your tear film but because you're  missing an oily component. The oil component keeps the tears from evaporating and provides stability to the tears.    This portion of the tears is produced by the Meibomian glands which are located just behind the base of the eyelashes. Your Meibomian glands are clogged because of a condition called Meibomitis. Meibomitis is caused by chronic inflammation inside the glands thought to be a reaction to the normal bacteria that live on your skin.     As this is a chronic condition the goal of treatment is to reduce your symptoms and the inflammation under control. The initial treatment is as follows:     - Warm Compresses: Heat a wash cloth by running it under hot water. Then hold this wash cloth over your closed eyelids and allow your eyelids to absorb the heat. After 20-30 seconds once the wash cloth cools down you'll need to heat it up again.  (An alternative heat source is a gel pack or microwavable eye mask, warmed in the microwave or in a dish of water,  wrapped with a warm wet washcloth). You want to get 10 minutes of warmth to your eyelids, so if the compress feels cool before the end of 10 minutes you should reheat it. You should consistently do this 2 times a day.     - Artificial tears: Some good Artificial tear drops to try are Blink, Refresh Optive, Systane Balance, Thera Tears or Genteal. You should use these consistently 2-3 times a day more if you need them. It's important to use these when in breezy environments or when doing prolonged near work such as reading or computer. The goal is to prevent your eyes from drying rather than instilling them once your eye is already dry.     - Omega 3 Supplement: 1000 mg of good quality fish oil (labeled DHA and/or EPA).   Fish oil, flax seed oil or omega 3 supplements have found to be beneficial in Meibomitis and dry eye syndrome. There are a lot of choices out there and not one single product has been shown to be more beneficial than others.    It usually takes  1-2 months of treatment before you'll notice a difference. However some will continue to have problems even with this therapy. If you continue to have problems please let me know.

## 2019-03-18 NOTE — PROGRESS NOTES
HPI     Mr. Herminio Burger was referred by Ted Paula MD for visual   impairment.    Patient complains of OU being very scratchy, feels like he has sand in   them, and occasional pain. Improved with OTC drops (Liquid tears or   ClearEyes), but rarely used. He also reports vision fogs up occasionally   OD and improves if he turns his head to the right. This started about 2   months ago.    He reports clear vision all ranges without correction. He uses bifocal   glasses only for reading fine print. He declines refraction today and will   return in the future if he needs new glasses prescription.     (-)drops  (-)flashes  (-)floaters  (-)diplopia    (-)Diabetes  Hemoglobin A1C       Date                     Value               Ref Range             Status                04/16/2014               7.1 (H)             4.5 - 6.2 %           Final              (+)HTN  BP Readings from Last 3 Encounters:  02/12/19 : 120/80  02/02/19 : (!) 160/96  01/16/19 : (!) 163/91      OCULAR HISTORY  Last Eye Exam: 06/09/2017 with Dr. Daniel  (-)eye surgery   Cataracts OU  Hypertensive Retinopathy OU    FAMILY HISTORY  (-)Glaucoma         Last edited by Diana Daniel, OD on 3/18/2019 12:24 PM. (History)            Assessment /Plan     For exam results, see Encounter Report.    Nuclear sclerotic cataract of both eyes   Stable and not visually significant. Monitor.    Hypertensive retinopathy of both eyes   Stable and mild retinopathy OU, likely due to previously uncontrolled HTN, but BP has been well controlled recently. Patient educated on findings and potential risk for retinal vascular occlusions. Continue management of HTN as directed by PCP. Continue regular aerobic exercise; discussed low-salt diet. Monitor yearly.     Meibomian gland dysfunction   Patient educated on nature of MGD and relationship to symptoms. Start oil-based artificial tears TID OU and warm compresses for 10 minutes qDay-BID OU.     Refractive error   Pt declines  refraction today. RTC for refraction in the future if new glasses needed.        RTC 1 year

## 2019-03-18 NOTE — LETTER
March 18, 2019      Ted Paula MD  [address]  [facility] [address]           [facility]  [address]  [facility] [address]  Phone: [phone]          Patient: Herminio Burger   MR Number: [id number]   YOB: 1945   Date of Visit: 3/18/2019       Dear Dr. Ted Paula:    Thank you for referring Herminio Burger to me for evaluation. Attached you will find relevant portions of my assessment and plan of care.    If you have questions, please do not hesitate to call me. I look forward to following Herminio Burger along with you.    Sincerely,    Diana Daniel, OD    Enclosure  CC:  No Recipients    If you would like to receive this communication electronically, please contact [email or web address] or [phone] to request more information on [facility] access.    For providers and/or their staff who would like to refer a patient to [facility], please contact us through our one-stop-shop provider referral line, [facility] , at [phone].    If you feel you have received this communication in error or would no longer like to receive these types of communications, please e-mail [email or web address]

## 2019-03-22 ENCOUNTER — TELEPHONE (OUTPATIENT)
Dept: UROLOGY | Facility: CLINIC | Age: 74
End: 2019-03-22

## 2019-03-22 DIAGNOSIS — R39.13 SPLIT OF URINARY STREAM: Primary | ICD-10-CM

## 2019-03-22 DIAGNOSIS — R33.9 INCOMPLETE BLADDER EMPTYING: ICD-10-CM

## 2019-04-03 ENCOUNTER — TELEPHONE (OUTPATIENT)
Dept: INTERNAL MEDICINE | Facility: CLINIC | Age: 74
End: 2019-04-03

## 2019-04-03 ENCOUNTER — ANESTHESIA EVENT (OUTPATIENT)
Dept: SURGERY | Facility: HOSPITAL | Age: 74
End: 2019-04-03
Payer: MEDICARE

## 2019-04-03 DIAGNOSIS — Z01.818 PREOPERATIVE TESTING: Primary | ICD-10-CM

## 2019-04-03 NOTE — TELEPHONE ENCOUNTER
----- Message from Alina Desouza RN sent at 4/3/2019 11:57 AM CDT -----  Pt is scheduled for TURP with Dr. Staples on 4/16/2019.  (General anesthesia, 105 minutes).  Anesthesia review in progress.    Is pt optimized/cleared for surgery?  Please advise.    Thank you,  Alina Desouza RN  [facility]

## 2019-04-03 NOTE — ANESTHESIA PREPROCEDURE EVALUATION
Alina Desouza, RN   Registered Nurse      Pre Admission Screening   Signed                      Anesthesia Assessment: Preoperative EQUATION     Planned Procedure: Procedure(s) (LRB):  TURP, WITHOUT USING LASER (N/A)  Requested Anesthesia Type:General  Surgeon: Marissa Staples MD  Service: Urology  Known or anticipated Date of Surgery:4/16/2019     Surgeon notes: reviewed     Electronic QUestionnaire Assessment completed via nurse interview with patient.      NO AQ     Triage considerations:      The patient has no apparent active cardiac condition (No unstable coronary Syndrome such as severe unstable angina or recent [<1 month] myocardial infarction, decompensated CHF, severe valvular   disease or significant arrhythmia)     Previous anesthesia records:GETA and No problems  11/11/2014 Ileostomy Closure, Cholecystectomy, Ventral Hernia repair  Airway/Jaw/Neck:  Airway Findings: (pt has only one vocal cord) Mouth Opening: Normal General Airway Assessment: Adult  Oropharynx Findings: hoarse voice.  Mallampati: II  TM Distance: Normal, at least 6 cm  Jaw/Neck Findings:  Neck ROM: Normal ROM         11/11/14 Placement Time: 1318 Method of Intubation: Direct laryngoscopy Inserted by: CRNA Airway Device: Endotracheal Tube Mask Ventilation: Easy Intubated: Postinduction Blade: Hopkins #2 Airway Device Size: 8.0 Style: Cuffed Cuff Inflation: Minimal occlusive pressure Placement Verified By: Auscultation;Capnometry Grade: Grade I Complicating Factors: None Findings Post-Intubation: Positive EtCO2;Bilateral breath sounds;Atraumatic/Condition of teeth unchanged Depth of Insertion (cm): 23 Secured at: Lips Complications: None Breath Sounds: Equal Bilateral Insertion attempts (enter comment if more than 2 attempts): 1      Last PCP note: within 3 months , within [facility]  Subspecialty notes: Cardiothoracic     Other important co-morbidities: Arthritis, Asthma, CAD, COPD, Emphysema, HTN, Hx DVT     Tests already  available:  No recent tests. Available tests,  3-6 months ago , within [facility] . 12/29/2018 EKG                            Instructions given. (See in Nurse's note)     Optimization:  Anesthesia Preop Clinic Assessment  Indicated: Not required for this procedure    Medical Opinion Indicated: PCP optimization note                                        Plan:    Testing:  Hematology Profile and BMP                           Consultation:Patient's PCP for a statement of optimization                            Patient  has previously scheduled Medical Appointment:  4/4/2019     Navigation: Tests Scheduled.                         Consults scheduled.                        Results will be tracked by Preop Clinic.          4/3/2019  Hi, based on my last appt with him 2/12 and seeing him with his wife last week I do not see any medical contra-indications to proceeding with the urologic procedure.  Let me know if you have any more questions.  Ted Paula    4/4/2019  Lab results noted.                                                                                                                 04/03/2019  Herminio Burger is a 73 y.o., male.    Anesthesia Evaluation    I have reviewed the Patient Summary Reports.    I have reviewed the Nursing Notes.   I have reviewed the Medications.     Review of Systems  Anesthesia Hx:  No problems with previous Anesthesia  History of prior surgery of interest to airway management or planning: lung surgery. Previous anesthesia: General 11/11/2014  Ileostomy Closure, Cholecystectomy, Ventral Hernia repair with general anesthesia.  Procedure performed at an [facility]. Denies Family Hx of Anesthesia complications.   Denies Personal Hx of Anesthesia complications.   Social:  Former Smoker, Social Alcohol Use Quit in 2014. 50 pack year history   Hematology/Oncology:  Hematology Normal   Oncology Normal     EENT/Dental:EENT/Dental Normal   Cardiovascular:    Denies Angina.  Coronary  Artery Disease:  Deep Venous Thrombosis (DVT), Hx of DVT  Hypertension , Fairly Controlled on RX    Pulmonary:   Denies Shortness of breath.  Denies Recent URI.  Asthma:  last episode was > 1 year ago. Emergency visits this year is none. Chronic Obstructive Pulmonary Disease (COPD): Emphysema Inhaler use is maintenance inhaler daily.  Hx of Lung/Chest Surgery or Procedure: ( right VATS on 2/27/14 for apical segmentectomy/blebectomy) In Past, right    Renal/:  Renal Symptoms/Infections/Stones:  Incomplete bladder emptying   Hepatic/GI:  Hepatic/GI Normal    Musculoskeletal:   Arthritis     Neurological:  Neurology Normal    Endocrine:  Endocrine Normal    Psych:  Psychiatric Normal           Physical Exam  General:  Well nourished    Airway/Jaw/Neck:  Airway Findings: Mouth Opening: Normal Tongue: Normal  General Airway Assessment: Adult  Mallampati: II  TM Distance: Normal, at least 6 cm       Chest/Lungs:  Chest/Lungs Findings: Clear to auscultation, Normal Respiratory Rate     Heart/Vascular:  Heart Findings: Rate: Normal  Rhythm: Regular Rhythm             Anesthesia Plan  Type of Anesthesia, risks & benefits discussed:  Anesthesia Type:  general  Patient's Preference:   Intra-op Monitoring Plan: standard ASA monitors  Intra-op Monitoring Plan Comments:   Post Op Pain Control Plan: multimodal analgesia and IV/PO Opioids PRN  Post Op Pain Control Plan Comments:   Induction:   IV  Beta Blocker:         Informed Consent: Patient understands risks and agrees with Anesthesia plan.  Questions answered. Anesthesia consent signed with patient.  ASA Score: 3     Day of Surgery Review of History & Physical:            Ready For Surgery From Anesthesia Perspective.

## 2019-04-03 NOTE — TELEPHONE ENCOUNTER
Hi, based on my last appt with him 2/12 and seeing him with his wife last week I do not see any medical contra-indications to proceeding with the urologic procedure.  Let me know if you have any more questions.  Ted Staples and staff

## 2019-04-03 NOTE — PRE ADMISSION SCREENING
Anesthesia Assessment: Preoperative EQUATION    Planned Procedure: Procedure(s) (LRB):  TURP, WITHOUT USING LASER (N/A)  Requested Anesthesia Type:General  Surgeon: Marissa Staples MD  Service: Urology  Known or anticipated Date of Surgery:4/16/2019    Surgeon notes: reviewed    Electronic QUestionnaire Assessment completed via nurse interview with patient.      NO AQ    Triage considerations:     The patient has no apparent active cardiac condition (No unstable coronary Syndrome such as severe unstable angina or recent [<1 month] myocardial infarction, decompensated CHF, severe valvular   disease or significant arrhythmia)    Previous anesthesia records:GETA and No problems  11/11/2014 Ileostomy Closure, Cholecystectomy, Ventral Hernia repair  Airway/Jaw/Neck:  Airway Findings: (pt has only one vocal cord) Mouth Opening: Normal General Airway Assessment: Adult  Oropharynx Findings: hoarse voice.  Mallampati: II  TM Distance: Normal, at least 6 cm  Jaw/Neck Findings:  Neck ROM: Normal ROM        11/11/14 Placement Time: 1318 Method of Intubation: Direct laryngoscopy Inserted by: CRNA Airway Device: Endotracheal Tube Mask Ventilation: Easy Intubated: Postinduction Blade: Hopkins #2 Airway Device Size: 8.0 Style: Cuffed Cuff Inflation: Minimal occlusive pressure Placement Verified By: Auscultation;Capnometry Grade: Grade I Complicating Factors: None Findings Post-Intubation: Positive EtCO2;Bilateral breath sounds;Atraumatic/Condition of teeth unchanged Depth of Insertion (cm): 23 Secured at: Lips Complications: None Breath Sounds: Equal Bilateral Insertion attempts (enter comment if more than 2 attempts): 1     Last PCP note: within 3 months , within [facility]  Subspecialty notes: Cardiothoracic    Other important co-morbidities: Arthritis, Asthma, CAD, COPD, Emphysema, HTN, Hx DVT     Tests already available:  No recent tests. Available tests,  3-6 months ago , within [facility] . 12/29/2018 EKG             Instructions given. (See in Nurse's note)    Optimization:  Anesthesia Preop Clinic Assessment  Indicated: Not required for this procedure    Medical Opinion Indicated: PCP optimization note          Plan:    Testing:  Hematology Profile and BMP     Consultation:Patient's PCP for a statement of optimization      Patient  has previously scheduled Medical Appointment:  4/4/2019    Navigation: Tests Scheduled.              Consults scheduled.             Results will be tracked by Preop Clinic.

## 2019-04-04 ENCOUNTER — OFFICE VISIT (OUTPATIENT)
Dept: UROLOGY | Facility: CLINIC | Age: 74
End: 2019-04-04
Payer: MEDICARE

## 2019-04-04 ENCOUNTER — LAB VISIT (OUTPATIENT)
Dept: LAB | Facility: HOSPITAL | Age: 74
End: 2019-04-04
Attending: ANESTHESIOLOGY
Payer: MEDICARE

## 2019-04-04 VITALS
HEART RATE: 76 BPM | DIASTOLIC BLOOD PRESSURE: 88 MMHG | SYSTOLIC BLOOD PRESSURE: 156 MMHG | BODY MASS INDEX: 25.4 KG/M2 | HEIGHT: 71 IN | WEIGHT: 181.44 LBS

## 2019-04-04 DIAGNOSIS — R39.12 BENIGN PROSTATIC HYPERPLASIA WITH WEAK URINARY STREAM: Primary | ICD-10-CM

## 2019-04-04 DIAGNOSIS — Z01.818 PREOPERATIVE TESTING: ICD-10-CM

## 2019-04-04 DIAGNOSIS — N40.1 BENIGN PROSTATIC HYPERPLASIA WITH WEAK URINARY STREAM: Primary | ICD-10-CM

## 2019-04-04 DIAGNOSIS — N40.0 BENIGN PROSTATIC HYPERPLASIA, UNSPECIFIED WHETHER LOWER URINARY TRACT SYMPTOMS PRESENT: ICD-10-CM

## 2019-04-04 LAB
ANION GAP SERPL CALC-SCNC: 6 MMOL/L (ref 8–16)
BUN SERPL-MCNC: 10 MG/DL (ref 8–23)
CALCIUM SERPL-MCNC: 9.4 MG/DL (ref 8.7–10.5)
CHLORIDE SERPL-SCNC: 106 MMOL/L (ref 95–110)
CO2 SERPL-SCNC: 29 MMOL/L (ref 23–29)
COMPLEXED PSA SERPL-MCNC: 3.9 NG/ML (ref 0–4)
CREAT SERPL-MCNC: 1.2 MG/DL (ref 0.5–1.4)
ERYTHROCYTE [DISTWIDTH] IN BLOOD BY AUTOMATED COUNT: 14.7 % (ref 11.5–14.5)
EST. GFR  (AFRICAN AMERICAN): >60 ML/MIN/1.73 M^2
EST. GFR  (NON AFRICAN AMERICAN): 58.8 ML/MIN/1.73 M^2
GLUCOSE SERPL-MCNC: 105 MG/DL (ref 70–110)
HCT VFR BLD AUTO: 45.9 % (ref 40–54)
HGB BLD-MCNC: 15.2 G/DL (ref 14–18)
MCH RBC QN AUTO: 30.3 PG (ref 27–31)
MCHC RBC AUTO-ENTMCNC: 33.1 G/DL (ref 32–36)
MCV RBC AUTO: 92 FL (ref 82–98)
PLATELET # BLD AUTO: 161 K/UL (ref 150–350)
PMV BLD AUTO: 10.4 FL (ref 9.2–12.9)
POTASSIUM SERPL-SCNC: 4.5 MMOL/L (ref 3.5–5.1)
RBC # BLD AUTO: 5.01 M/UL (ref 4.6–6.2)
SODIUM SERPL-SCNC: 141 MMOL/L (ref 136–145)
WBC # BLD AUTO: 2.17 K/UL (ref 3.9–12.7)

## 2019-04-04 PROCEDURE — 36415 COLL VENOUS BLD VENIPUNCTURE: CPT | Mod: HCNC

## 2019-04-04 PROCEDURE — 99024 POSTOP FOLLOW-UP VISIT: CPT | Mod: HCNC,S$GLB,, | Performed by: UROLOGY

## 2019-04-04 PROCEDURE — 99999 PR PBB SHADOW E&M-EST. PATIENT-LVL III: ICD-10-PCS | Mod: PBBFAC,HCNC,, | Performed by: UROLOGY

## 2019-04-04 PROCEDURE — 80048 BASIC METABOLIC PNL TOTAL CA: CPT | Mod: HCNC

## 2019-04-04 PROCEDURE — 99999 PR PBB SHADOW E&M-EST. PATIENT-LVL III: CPT | Mod: PBBFAC,HCNC,, | Performed by: UROLOGY

## 2019-04-04 PROCEDURE — 85027 COMPLETE CBC AUTOMATED: CPT | Mod: HCNC

## 2019-04-04 PROCEDURE — 84153 ASSAY OF PSA TOTAL: CPT | Mod: HCNC

## 2019-04-04 PROCEDURE — 99024 PR POST-OP FOLLOW-UP VISIT: ICD-10-PCS | Mod: HCNC,S$GLB,, | Performed by: UROLOGY

## 2019-04-04 NOTE — H&P (VIEW-ONLY)
CHIEF COMPLAINT:    Mr. Burger is a 73 y.o. male presenting to sign consent for TURP, for split urinary stream.      PRESENTING ILLNESS:    Herminio Burger is a 73 y.o. male who presents for a pre op appointment.  He has a history of a split urinary stream and sometimes sprays in a direction that he had not intended.  He feels like this stems from a small split in the urethral meatus.  He is on Flomax and it has not changed the manner in which he voids.  He denies any urgency, hesitancy.  He has frequency x 4-5, nocturia x 0, seldom x 1.  Denies any gross hematuria or recurrent UTI.     On urodynamics, he was found to have obstructed voiding with a pdet at Q max 75.7 cm H2O.  PVR has been variable from 50 ml on his UDS to 100 ml.  On cystoscopy, he was found to have a large medial lobe.  Based on this, I recommended a traditional TURP.     REVIEW OF SYSTEMS:    Review of Systems   Constitutional: Negative.    HENT: Negative.    Eyes: Negative.    Respiratory: Negative.    Cardiovascular: Negative.    Gastrointestinal: Negative.    Genitourinary:        Slow stream   Musculoskeletal: Negative.    Skin: Negative.    Neurological: Negative.    Endo/Heme/Allergies: Negative.    Psychiatric/Behavioral: Negative.        PATIENT HISTORY:    Past Medical History:   Diagnosis Date    Asthma     Bilateral carotid artery disease 6/6/2018    Cervical spine arthritis 6/6/2018    Colon necrosis     ischemia    Colon polyps     COPD (chronic obstructive pulmonary disease)     DVT (deep venous thrombosis)     right arm resolved    Elevated liver enzymes     Emphysema     Gallstones     Herniated disc     Hypertension     Ileostomy in place     Ischemic colitis     Screening for colon cancer        Past Surgical History:   Procedure Laterality Date    ABDOMINAL REEXPLORATION      ABDOMINAL SURGERY      BRONCHOSCOPY-FIBEROPTIC N/A 2/27/2014    Performed by [facility] at [facility] OR [address] [place] [address]     CHOLECYSTECTOMY      CHOLECYSTECTOMY N/A 11/11/2014    [facility]    CLOSURE-ILEOSTOMY N/A 11/11/2014    [facility]see Foster [facility] at [facility]    COLON SURGERY      ilesostomy closure    COLONOSCOPY N/A 10/6/2014    [facility] ([facility])    COLONOSCOPY N/A 3/3/2014    [facility] at [facility] ([facility])    COLONOSCOPY W/ POLYPECTOMY      COLOSTOMY      CREATION, ILEOSTOMY Right 3/3/2014    [facility]    EGD (ESOPHAGOGASTRODUODENOSCOPY) N/A 4/17/2014    Performed by Hero Palacio [facility] at [facility] ([facility])    ERCP N/A 11/7/2014    Performed by Berny Corbett [facility] at [facility] ([facility])    ESOPHAGOGASTRODUODENOSCOPY (EGD) N/A 10/28/2014    Performed by Raul Mckinley MD at [facility] ([facility])    EXPLORATORY-LAPAROTOMY N/A 3/19/2014    [facility] Bill Vee [facility] at [facility] OR [facility]    EXPLORATORY-LAPAROTOMY N/A 3/3/2014    Per[facility] at [facility]    GASTROSTOMY TUBE PLACEMENT      REPAIR-HERNIA-VENTRAL N/A 11/11/2014    [facility]formed [facility] at [facility]    scalp lac       1970    SHOULDER ARTHROSCOPY      TONSILLECTOMY      ULTRASOUND-ENDOSCOPIC-UPPER N/A 11/7/2014    Performed by Berny Corbett [facility] ([facility])    VATS Right     VATS (VIDEO-ASSISTED THORACOSCOPIC SURGERY) Right 2/27/2014    [facility] at [facility] OR [facility]       Family History   Problem Relation Age of Onset    Heart disease Mother     Diabetes Mother     Cancer Father     No Known Problems Sister     Cancer Brother         prostate     Nephrotic syndrome Daughter     Cancer Sister         gallbladder    Stroke Brother     Cancer Brother         liver, kidney, prostate    Congenital heart disease Brother     Cancer Daughter         breast    Anxiety disorder Daughter       Socioeconomic History    Marital status:    Tobacco Use    Smoking status: Former Smoker     Packs/day: 1.00     Years: 50.00     Pack years: 50.00     Types: Cigarettes     Last attempt to quit: 2014     Years since quittin.1    Smokeless tobacco: Never Used   Substance and Sexual Activity    Alcohol use: Yes     Alcohol/week: 0.0 oz     Comment: less than one monthly    Drug use: Yes     Types: Marijuana     Comment: daily    Sexual activity: Never   Other Topics Concern    Not on file   Social History Narrative    Previous . 3 kids, 51, 49, 48. No exercise.       Allergies:  Patient has no known allergies.    Medications:  Outpatient Encounter Medications as of 2019   Medication Sig Dispense Refill    amLODIPine (NORVASC) 5 MG tablet Take 1 tablet (5 mg total) by mouth once daily. 90 tablet 11    diphenhydrAMINE (BENADRYL) 25 mg capsule Take 1 each (25 mg total) by mouth every 6 (six) hours as needed for Itching. 30 capsule 1    HYDROcodone-acetaminophen (NORCO) 5-325 mg per tablet Take 1 tablet by mouth every 8 (eight) hours as needed for Pain. 21 tablet 0    ibuprofen (ADVIL,MOTRIN) 200 MG tablet Take 200 mg by mouth 2 (two) times daily as needed for Pain.      losartan (COZAAR) 100 MG tablet TAKE 1 TABLET BY MOUTH EVERY DAY 90 tablet 11    triamcinolone acetonide 0.1% (KENALOG) 0.1 % ointment YEIMY TO RASH ON BODY BID PRN FOR ITCHING AND FLARES  1    vitamin E 1000 UNIT capsule Take 1,000 Units by mouth once daily.      albuterol 90 mcg/actuation inhaler Inhale 2 puffs into the lungs every 6 (six) hours as needed for Wheezing. 18 g 11    [DISCONTINUED] BANOPHEN 50 mg capsule TK ONE C PO  BID PRN  1    [DISCONTINUED] cyclobenzaprine (FLEXERIL) 10 MG tablet TK 1 T PO  QD  2     No facility-administered encounter medications on file as of 2019.          PHYSICAL EXAMINATION:    The patient generally appears in good health, is appropriately interactive, and  is in no apparent distress.    Skin: No lesions.    Mental: Cooperative with normal affect.    Neuro: Grossly intact.    HEENT: Normal. No evidence of lymphadenopathy.    Chest:  normal inspiratory effort.    Abdomen: Soft, non-tender. No masses or organomegaly. Bladder is not palpable. No evidence of flank discomfort. No evidence of inguinal hernia.    Extremities: No clubbing, cyanosis, or edema    Scrotum showed no rashes or lesions. Testicles showed no masses or tenderness.  Epididymis showed no masses or tenderness.  Penis was un-circumcised. No meatal stenosis. No penile discharge.  No inguinal hernias.  No inguinal lymphadenopathy.   CARROL:  60 g prostate without nodularity.  Normal rectal tone, no anal masses.     LABS:    Lab Results   Component Value Date    BUN 10 07/12/2018    CREATININE 1.2 07/12/2018     UA 1.010, pH 6, otherwise, negative    IMPRESSION:    Encounter Diagnoses   Name Primary?    Benign prostatic hyperplasia with weak urinary stream Yes       PLAN:    1.  Consented for TURP after discussing risks and benefits.   2.  Will plan to keep overnight on CBI  3.  Catheter duration is variable based on findings at TURP.   4.  He has a positive family history for prostate cancer, last PSA was 2016.  Will get a PSA today, if elevated, will do a biopsy at the time of TURP.  This was discussed with the patient and his wife.

## 2019-04-04 NOTE — PROGRESS NOTES
CHIEF COMPLAINT:    Mr. Burger is a 73 y.o. male presenting to sign consent for TURP, for split urinary stream.      PRESENTING ILLNESS:    Herminio Burger is a 73 y.o. male who presents for a pre op appointment.  He has a history of a split urinary stream and sometimes sprays in a direction that he had not intended.  He feels like this stems from a small split in the urethral meatus.  He is on Flomax and it has not changed the manner in which he voids.  He denies any urgency, hesitancy.  He has frequency x 4-5, nocturia x 0, seldom x 1.  Denies any gross hematuria or recurrent UTI.     On urodynamics, he was found to have obstructed voiding with a pdet at Q max 75.7 cm H2O.  PVR has been variable from 50 ml on his UDS to 100 ml.  On cystoscopy, he was found to have a large medial lobe.  Based on this, I recommended a traditional TURP.     REVIEW OF SYSTEMS:    Review of Systems   Constitutional: Negative.    HENT: Negative.    Eyes: Negative.    Respiratory: Negative.    Cardiovascular: Negative.    Gastrointestinal: Negative.    Genitourinary:        Slow stream   Musculoskeletal: Negative.    Skin: Negative.    Neurological: Negative.    Endo/Heme/Allergies: Negative.    Psychiatric/Behavioral: Negative.        PATIENT HISTORY:    Past Medical History:   Diagnosis Date    Asthma     Bilateral carotid artery disease 6/6/2018    Cervical spine arthritis 6/6/2018    Colon necrosis     ischemia    Colon polyps     COPD (chronic obstructive pulmonary disease)     DVT (deep venous thrombosis)     right arm resolved    Elevated liver enzymes     Emphysema     Gallstones     Herniated disc     Hypertension     Ileostomy in place     Ischemic colitis     Screening for colon cancer        Past Surgical History:   Procedure Laterality Date    ABDOMINAL REEXPLORATION      ABDOMINAL SURGERY      BRONCHOSCOPY-FIBEROPTIC N/A 2/27/2014    Performed by [facility] at [facility] OR [address] [place] [address]     CHOLECYSTECTOMY      CHOLECYSTECTOMY N/A 11/11/2014    Performed by Adrian Morrissey [facility] at [facility] OR [facility]    CLOSURE-ILEOSTOMY N/A 11/11/2014    Performed by Jessee Foster [facility] at [facility]    COLON SURGERY      ilesostomy closure    COLONOSCOPY N/A 10/6/2014    [facility]formed by Jessee Foster [facility] at [facility] ([facility])    COLONOSCOPY N/A 3/3/2014    Performed by Jessee Foster [facility] at [facility] ([facility])    COLONOSCOPY W/ POLYPECTOMY      COLOSTOMY      CREATION, ILEOSTOMY Right 3/3/2014    Performed by Jes[facility] Foster [facility] at [facility]    EGD (ESOPHAGOGASTRODUODENOSCOPY) N/A 4/17/2014    Performed by Hero Palacio MD at [facility] ([facility])    ERCP N/A 11/7/2014    Performed by Berny Corbett [facility] at [facility] ([facility])    ESOPHAGOGASTRODUODENOSCOPY (EGD) N/A 10/28/2014    Performed by Raul Mckinley MD at [facility] ([facility])    EXPLORATORY-LAPAROTOMY N/A 3/19/2014    Performed by Bill Vee MD at [facility]    EXPLORATORY-LAPAROTOMY N/A 3/3/2014    Performed by Jessee Foster [facility] at [facility]    GASTROSTOMY TUBE PLACEMENT      REPAIR-HERNIA-VENTRAL N/A 11/11/2014    Performed by Adrian Morrissey MD at [facility]    scalp lac       1970    SHOULDER ARTHROSCOPY      TONSILLECTOMY      ULTRASOUND-ENDOSCOPIC-UPPER N/A 11/7/2014    Performed by Berny Corbett MD at [facility] ([facility])    VATS Right     VATS (VIDEO-ASSISTED THORACOSCOPIC SURGERY) Right 2/27/2014    Performed by [facility]baldo Lucas [facility] at [facility]       Family History   Problem Relation Age of Onset    Heart disease Mother     Diabetes Mother     Cancer Father     No Known Problems Sister     Cancer Brother         prostate     Nephrotic syndrome Daughter     Cancer Sister         gallbladder    Stroke Brother     Cancer Brother         liver, kidney, prostate    Congenital heart disease Brother     Cancer Daughter         breast    Anxiety disorder Daughter       Socioeconomic History    Marital status:    Tobacco Use    Smoking status: Former Smoker     Packs/day: 1.00     Years: 50.00     Pack years: 50.00     Types: Cigarettes     Last attempt to quit: 2014     Years since quittin.1    Smokeless tobacco: Never Used   Substance and Sexual Activity    Alcohol use: Yes     Alcohol/week: 0.0 oz     Comment: less than one monthly    Drug use: Yes     Types: Marijuana     Comment: daily    Sexual activity: Never   Other Topics Concern    Not on file   Social History Narrative    Previous . 3 kids, 51, 51, 48. No exercise.       Allergies:  Patient has no known allergies.    Medications:  Outpatient Encounter Medications as of 2019   Medication Sig Dispense Refill    amLODIPine (NORVASC) 5 MG tablet Take 1 tablet (5 mg total) by mouth once daily. 90 tablet 11    diphenhydrAMINE (BENADRYL) 25 mg capsule Take 1 each (25 mg total) by mouth every 6 (six) hours as needed for Itching. 30 capsule 1    HYDROcodone-acetaminophen (NORCO) 5-325 mg per tablet Take 1 tablet by mouth every 8 (eight) hours as needed for Pain. 21 tablet 0    ibuprofen (ADVIL,MOTRIN) 200 MG tablet Take 200 mg by mouth 2 (two) times daily as needed for Pain.      losartan (COZAAR) 100 MG tablet TAKE 1 TABLET BY MOUTH EVERY DAY 90 tablet 11    triamcinolone acetonide 0.1% (KENALOG) 0.1 % ointment YEIMY TO RASH ON BODY BID PRN FOR ITCHING AND FLARES  1    vitamin E 1000 UNIT capsule Take 1,000 Units by mouth once daily.      albuterol 90 mcg/actuation inhaler Inhale 2 puffs into the lungs every 6 (six) hours as needed for Wheezing. 18 g 11    [DISCONTINUED] BANOPHEN 50 mg capsule TK ONE C PO  BID PRN  1    [DISCONTINUED] cyclobenzaprine (FLEXERIL) 10 MG tablet TK 1 T PO  QD  2     No facility-administered encounter medications on file as of 2019.          PHYSICAL EXAMINATION:    The patient generally appears in good health, is appropriately interactive, and  is in no apparent distress.    Skin: No lesions.    Mental: Cooperative with normal affect.    Neuro: Grossly intact.    HEENT: Normal. No evidence of lymphadenopathy.    Chest:  normal inspiratory effort.    Abdomen: Soft, non-tender. No masses or organomegaly. Bladder is not palpable. No evidence of flank discomfort. No evidence of inguinal hernia.    Extremities: No clubbing, cyanosis, or edema    Scrotum showed no rashes or lesions. Testicles showed no masses or tenderness.  Epididymis showed no masses or tenderness.  Penis was un-circumcised. No meatal stenosis. No penile discharge.  No inguinal hernias.  No inguinal lymphadenopathy.   CARROL:  60 g prostate without nodularity.  Normal rectal tone, no anal masses.     LABS:    Lab Results   Component Value Date    BUN 10 07/12/2018    CREATININE 1.2 07/12/2018     UA 1.010, pH 6, otherwise, negative    IMPRESSION:    Encounter Diagnoses   Name Primary?    Benign prostatic hyperplasia with weak urinary stream Yes       PLAN:    1.  Consented for TURP after discussing risks and benefits.   2.  Will plan to keep overnight on CBI  3.  Catheter duration is variable based on findings at TURP.   4.  He has a positive family history for prostate cancer, last PSA was 2016.  Will get a PSA today, if elevated, will do a biopsy at the time of TURP.  This was discussed with the patient and his wife.

## 2019-04-08 ENCOUNTER — TELEPHONE (OUTPATIENT)
Dept: UROLOGY | Facility: CLINIC | Age: 74
End: 2019-04-08

## 2019-04-08 NOTE — TELEPHONE ENCOUNTER
Pt was told that as long as meds were stopped 1 week prior to surgery he is good to go. Pt verbalized understanding.

## 2019-04-08 NOTE — TELEPHONE ENCOUNTER
----- Message from Daniela Solis LPN sent at 4/8/2019 10:10 AM CDT -----  Contact: pt: [phone]      ----- Message -----  From: Mariama Porter  Sent: 4/8/2019   8:03 AM  To: [facility] Staff    Needs Advice    Reason for call: pt states he was told to stop taking ibuprofen & vitamin E so many days in advance before scheduled surgery, stop taking the medication yesterday would like to know if this would affect surgery         Communication Preference: pt: [phone]

## 2019-04-12 ENCOUNTER — TELEPHONE (OUTPATIENT)
Dept: UROLOGY | Facility: CLINIC | Age: 74
End: 2019-04-12

## 2019-04-12 NOTE — TELEPHONE ENCOUNTER
Called pt to confirm 11:30am arrival time for procedure on 4/16. Gave pt NPO instructions and gave pt opportunity to ask questions. Pt verbalized understanding.

## 2019-04-15 RX ORDER — VANCOMYCIN HCL IN 5 % DEXTROSE 1.5G/250ML
1500 PLASTIC BAG, INJECTION (ML) INTRAVENOUS
Status: CANCELLED | OUTPATIENT
Start: 2019-04-15

## 2019-04-16 ENCOUNTER — HOSPITAL ENCOUNTER (OUTPATIENT)
Facility: HOSPITAL | Age: 74
Discharge: HOME | End: 2019-04-17
Attending: UROLOGY | Admitting: UROLOGY
Payer: MEDICARE

## 2019-04-16 ENCOUNTER — ANESTHESIA (OUTPATIENT)
Dept: SURGERY | Facility: HOSPITAL | Age: 74
End: 2019-04-16
Payer: MEDICARE

## 2019-04-16 DIAGNOSIS — N40.0 BPH (BENIGN PROSTATIC HYPERPLASIA): Primary | ICD-10-CM

## 2019-04-16 DIAGNOSIS — M47.812 ARTHRITIS OF NECK: ICD-10-CM

## 2019-04-16 PROCEDURE — 71000015 HC POSTOP RECOV 1ST HR: Mod: HCNC | Performed by: UROLOGY

## 2019-04-16 PROCEDURE — 25000003 PHARM REV CODE 250: Mod: HCNC | Performed by: NURSE ANESTHETIST, CERTIFIED REGISTERED

## 2019-04-16 PROCEDURE — 37000009 HC ANESTHESIA EA ADD 15 MINS: Mod: HCNC | Performed by: UROLOGY

## 2019-04-16 PROCEDURE — 63600175 PHARM REV CODE 636 W HCPCS: Mod: HCNC | Performed by: NURSE ANESTHETIST, CERTIFIED REGISTERED

## 2019-04-16 PROCEDURE — 71000016 HC POSTOP RECOV ADDL HR: Mod: HCNC | Performed by: UROLOGY

## 2019-04-16 PROCEDURE — 36000707: Mod: HCNC | Performed by: UROLOGY

## 2019-04-16 PROCEDURE — 37000008 HC ANESTHESIA 1ST 15 MINUTES: Mod: HCNC | Performed by: UROLOGY

## 2019-04-16 PROCEDURE — 71000044 HC DOSC ROUTINE RECOVERY FIRST HOUR: Mod: HCNC | Performed by: UROLOGY

## 2019-04-16 PROCEDURE — D9220A PRA ANESTHESIA: Mod: HCNC,ANES,, | Performed by: ANESTHESIOLOGY

## 2019-04-16 PROCEDURE — 25000003 PHARM REV CODE 250: Mod: HCNC | Performed by: STUDENT IN AN ORGANIZED HEALTH CARE EDUCATION/TRAINING PROGRAM

## 2019-04-16 PROCEDURE — 52601 PROSTATECTOMY (TURP): CPT | Mod: HCNC,,, | Performed by: UROLOGY

## 2019-04-16 PROCEDURE — 52601 PR TRANSURETHRAL ELEC-SURG PROSTATECTOM: ICD-10-PCS | Mod: HCNC,,, | Performed by: UROLOGY

## 2019-04-16 PROCEDURE — 63600175 PHARM REV CODE 636 W HCPCS: Mod: HCNC | Performed by: STUDENT IN AN ORGANIZED HEALTH CARE EDUCATION/TRAINING PROGRAM

## 2019-04-16 PROCEDURE — 25000003 PHARM REV CODE 250: Mod: HCNC

## 2019-04-16 PROCEDURE — 88305 TISSUE SPECIMEN TO PATHOLOGY - SURGERY: ICD-10-PCS | Mod: 26,HCNC,, | Performed by: PATHOLOGY

## 2019-04-16 PROCEDURE — 88305 TISSUE EXAM BY PATHOLOGIST: CPT | Mod: 26,HCNC,, | Performed by: PATHOLOGY

## 2019-04-16 PROCEDURE — D9220A PRA ANESTHESIA: ICD-10-PCS | Mod: HCNC,ANES,, | Performed by: ANESTHESIOLOGY

## 2019-04-16 PROCEDURE — 36000706: Mod: HCNC | Performed by: UROLOGY

## 2019-04-16 PROCEDURE — 88305 TISSUE EXAM BY PATHOLOGIST: CPT | Mod: HCNC | Performed by: PATHOLOGY

## 2019-04-16 PROCEDURE — 25000003 PHARM REV CODE 250: Mod: HCNC | Performed by: UROLOGY

## 2019-04-16 RX ORDER — ATROPA BELLADONNA AND OPIUM 16.2; 6 MG/1; MG/1
60 SUPPOSITORY RECTAL DAILY PRN
Status: DISCONTINUED | OUTPATIENT
Start: 2019-04-16 | End: 2019-04-17 | Stop reason: HOSPADM

## 2019-04-16 RX ORDER — HYDROCODONE BITARTRATE AND ACETAMINOPHEN 5; 325 MG/1; MG/1
TABLET ORAL
Status: COMPLETED
Start: 2019-04-16 | End: 2019-04-16

## 2019-04-16 RX ORDER — OXYCODONE HYDROCHLORIDE 10 MG/1
10 TABLET ORAL EVERY 4 HOURS PRN
Status: DISCONTINUED | OUTPATIENT
Start: 2019-04-16 | End: 2019-04-17 | Stop reason: HOSPADM

## 2019-04-16 RX ORDER — TAMSULOSIN HYDROCHLORIDE 0.4 MG/1
0.4 CAPSULE ORAL DAILY
Status: CANCELLED | OUTPATIENT
Start: 2019-04-16

## 2019-04-16 RX ORDER — POLYETHYLENE GLYCOL 3350 17 G/17G
17 POWDER, FOR SOLUTION ORAL DAILY
Qty: 1 BOTTLE | Refills: 0 | COMMUNITY
Start: 2019-04-16 | End: 2019-04-27

## 2019-04-16 RX ORDER — ACETAMINOPHEN 500 MG
1000 TABLET ORAL
Status: DISCONTINUED | OUTPATIENT
Start: 2019-04-16 | End: 2019-04-17 | Stop reason: HOSPADM

## 2019-04-16 RX ORDER — ROCURONIUM BROMIDE 10 MG/ML
INJECTION, SOLUTION INTRAVENOUS
Status: DISCONTINUED | OUTPATIENT
Start: 2019-04-16 | End: 2019-04-16

## 2019-04-16 RX ORDER — HYDROCODONE BITARTRATE AND ACETAMINOPHEN 5; 325 MG/1; MG/1
1 TABLET ORAL EVERY 8 HOURS PRN
Qty: 15 TABLET | Refills: 0 | Status: SHIPPED | OUTPATIENT
Start: 2019-04-16 | End: 2019-04-17 | Stop reason: SDUPTHER

## 2019-04-16 RX ORDER — DEXAMETHASONE SODIUM PHOSPHATE 4 MG/ML
INJECTION, SOLUTION INTRA-ARTICULAR; INTRALESIONAL; INTRAMUSCULAR; INTRAVENOUS; SOFT TISSUE
Status: DISCONTINUED | OUTPATIENT
Start: 2019-04-16 | End: 2019-04-16

## 2019-04-16 RX ORDER — ONDANSETRON 2 MG/ML
8 INJECTION INTRAMUSCULAR; INTRAVENOUS EVERY 6 HOURS PRN
Status: DISCONTINUED | OUTPATIENT
Start: 2019-04-16 | End: 2019-04-17 | Stop reason: HOSPADM

## 2019-04-16 RX ORDER — LIDOCAINE HYDROCHLORIDE 20 MG/ML
JELLY TOPICAL
Status: DISCONTINUED | OUTPATIENT
Start: 2019-04-16 | End: 2019-04-16 | Stop reason: HOSPADM

## 2019-04-16 RX ORDER — OXYCODONE HYDROCHLORIDE 5 MG/1
5 TABLET ORAL EVERY 4 HOURS PRN
Status: DISCONTINUED | OUTPATIENT
Start: 2019-04-16 | End: 2019-04-17 | Stop reason: HOSPADM

## 2019-04-16 RX ORDER — SODIUM CHLORIDE 0.9 % (FLUSH) 0.9 %
3 SYRINGE (ML) INJECTION
Status: DISCONTINUED | OUTPATIENT
Start: 2019-04-16 | End: 2019-04-16 | Stop reason: HOSPADM

## 2019-04-16 RX ORDER — SODIUM CHLORIDE, SODIUM LACTATE, POTASSIUM CHLORIDE, CALCIUM CHLORIDE 600; 310; 30; 20 MG/100ML; MG/100ML; MG/100ML; MG/100ML
INJECTION, SOLUTION INTRAVENOUS CONTINUOUS
Status: DISCONTINUED | OUTPATIENT
Start: 2019-04-16 | End: 2019-04-17 | Stop reason: HOSPADM

## 2019-04-16 RX ORDER — SODIUM CHLORIDE 9 MG/ML
INJECTION, SOLUTION INTRAVENOUS CONTINUOUS
Status: DISCONTINUED | OUTPATIENT
Start: 2019-04-16 | End: 2019-04-17 | Stop reason: HOSPADM

## 2019-04-16 RX ORDER — FAMOTIDINE 20 MG/1
20 TABLET, FILM COATED ORAL 2 TIMES DAILY
Status: DISCONTINUED | OUTPATIENT
Start: 2019-04-16 | End: 2019-04-17 | Stop reason: HOSPADM

## 2019-04-16 RX ORDER — LIDOCAINE HCL/PF 100 MG/5ML
SYRINGE (ML) INTRAVENOUS
Status: DISCONTINUED | OUTPATIENT
Start: 2019-04-16 | End: 2019-04-16

## 2019-04-16 RX ORDER — TAMSULOSIN HYDROCHLORIDE 0.4 MG/1
0.4 CAPSULE ORAL DAILY
COMMUNITY
End: 2019-06-21

## 2019-04-16 RX ORDER — HYDROMORPHONE HYDROCHLORIDE 1 MG/ML
0.2 INJECTION, SOLUTION INTRAMUSCULAR; INTRAVENOUS; SUBCUTANEOUS EVERY 5 MIN PRN
Status: DISCONTINUED | OUTPATIENT
Start: 2019-04-16 | End: 2019-04-16 | Stop reason: HOSPADM

## 2019-04-16 RX ORDER — AMLODIPINE BESYLATE 5 MG/1
5 TABLET ORAL DAILY
Status: DISCONTINUED | OUTPATIENT
Start: 2019-04-16 | End: 2019-04-17 | Stop reason: HOSPADM

## 2019-04-16 RX ORDER — FENTANYL CITRATE 50 UG/ML
25 INJECTION, SOLUTION INTRAMUSCULAR; INTRAVENOUS EVERY 5 MIN PRN
Status: DISCONTINUED | OUTPATIENT
Start: 2019-04-16 | End: 2019-04-16 | Stop reason: HOSPADM

## 2019-04-16 RX ORDER — PROPOFOL 10 MG/ML
VIAL (ML) INTRAVENOUS
Status: DISCONTINUED | OUTPATIENT
Start: 2019-04-16 | End: 2019-04-16

## 2019-04-16 RX ORDER — FENTANYL CITRATE 50 UG/ML
INJECTION, SOLUTION INTRAMUSCULAR; INTRAVENOUS
Status: DISCONTINUED | OUTPATIENT
Start: 2019-04-16 | End: 2019-04-16

## 2019-04-16 RX ORDER — ALBUTEROL SULFATE 90 UG/1
2 AEROSOL, METERED RESPIRATORY (INHALATION) EVERY 6 HOURS PRN
Status: CANCELLED | OUTPATIENT
Start: 2019-04-16

## 2019-04-16 RX ORDER — CEFAZOLIN SODIUM 1 G/3ML
2 INJECTION, POWDER, FOR SOLUTION INTRAMUSCULAR; INTRAVENOUS
Status: DISCONTINUED | OUTPATIENT
Start: 2019-04-16 | End: 2019-04-16 | Stop reason: HOSPADM

## 2019-04-16 RX ORDER — HYDROCODONE BITARTRATE AND ACETAMINOPHEN 5; 325 MG/1; MG/1
1 TABLET ORAL ONCE
Status: COMPLETED | OUTPATIENT
Start: 2019-04-16 | End: 2019-04-16

## 2019-04-16 RX ORDER — ONDANSETRON 2 MG/ML
INJECTION INTRAMUSCULAR; INTRAVENOUS
Status: DISCONTINUED | OUTPATIENT
Start: 2019-04-16 | End: 2019-04-16

## 2019-04-16 RX ADMIN — ACETAMINOPHEN 1000 MG: 500 TABLET ORAL at 08:04

## 2019-04-16 RX ADMIN — PROPOFOL 150 MG: 10 INJECTION, EMULSION INTRAVENOUS at 12:04

## 2019-04-16 RX ADMIN — HYDROCODONE BITARTRATE AND ACETAMINOPHEN 1 TABLET: 5; 325 TABLET ORAL at 03:04

## 2019-04-16 RX ADMIN — ONDANSETRON 4 MG: 2 INJECTION INTRAMUSCULAR; INTRAVENOUS at 12:04

## 2019-04-16 RX ADMIN — FENTANYL CITRATE 50 MCG: 50 INJECTION, SOLUTION INTRAMUSCULAR; INTRAVENOUS at 12:04

## 2019-04-16 RX ADMIN — DEXAMETHASONE SODIUM PHOSPHATE 8 MG: 4 INJECTION, SOLUTION INTRAMUSCULAR; INTRAVENOUS at 12:04

## 2019-04-16 RX ADMIN — OXYCODONE HYDROCHLORIDE 10 MG: 10 TABLET ORAL at 08:04

## 2019-04-16 RX ADMIN — FAMOTIDINE 20 MG: 20 TABLET, FILM COATED ORAL at 08:04

## 2019-04-16 RX ADMIN — CEFAZOLIN 2 G: 330 INJECTION, POWDER, FOR SOLUTION INTRAMUSCULAR; INTRAVENOUS at 12:04

## 2019-04-16 RX ADMIN — ROCURONIUM BROMIDE 50 MG: 10 INJECTION, SOLUTION INTRAVENOUS at 12:04

## 2019-04-16 RX ADMIN — FENTANYL CITRATE 50 MCG: 50 INJECTION, SOLUTION INTRAMUSCULAR; INTRAVENOUS at 02:04

## 2019-04-16 RX ADMIN — SODIUM CHLORIDE, SODIUM GLUCONATE, SODIUM ACETATE, POTASSIUM CHLORIDE, MAGNESIUM CHLORIDE, SODIUM PHOSPHATE, DIBASIC, AND POTASSIUM PHOSPHATE: .53; .5; .37; .037; .03; .012; .00082 INJECTION, SOLUTION INTRAVENOUS at 01:04

## 2019-04-16 RX ADMIN — LIDOCAINE HYDROCHLORIDE 100 MG: 20 INJECTION, SOLUTION INTRAVENOUS at 12:04

## 2019-04-16 RX ADMIN — SODIUM CHLORIDE: 0.9 INJECTION, SOLUTION INTRAVENOUS at 12:04

## 2019-04-16 NOTE — INTERVAL H&P NOTE
The patient has been examined and the H&P has been reviewed:    I concur with the findings and no changes have occurred since H&P was written.   Urine dipstick today negative for blood, LE, nitrites.    Anesthesia/Surgery risks, benefits and alternative options discussed and understood by patient/family.          Active Hospital Problems    Diagnosis  POA    BPH (benign prostatic hyperplasia) [N40.0]  Yes      Resolved Hospital Problems   No resolved problems to display.     Patient seen in holding.  No changes in clinical condition.  Proceed with planned procedure.

## 2019-04-16 NOTE — ANESTHESIA POSTPROCEDURE EVALUATION
Anesthesia Post Evaluation    Patient: Herminio Burger    Procedure(s) Performed: Procedure(s) (LRB):  TURP, WITHOUT USING LASER (N/A)  CYSTOSCOPY    Final Anesthesia Type: general  Patient location during evaluation: PACU  Patient participation: Yes- Able to Participate  Level of consciousness: awake and alert  Post-procedure vital signs: reviewed and stable  Pain management: adequate  Airway patency: patent  PONV status at discharge: No PONV  Anesthetic complications: no      Cardiovascular status: blood pressure returned to baseline  Respiratory status: unassisted  Follow-up not needed.          Vitals Value Taken Time   /93 4/16/2019  2:47 PM   Temp 36.7 °C (98 °F) 4/16/2019 12:08 PM   Pulse 57 4/16/2019  2:57 PM   Resp 14 4/16/2019  2:57 PM   SpO2 97 % 4/16/2019  2:57 PM   Vitals shown include unvalidated device data.      No case tracking events are documented in the log.      Pain/Francis Score: Francis Score: 10 (4/16/2019  2:40 PM)

## 2019-04-16 NOTE — TRANSFER OF CARE
"Anesthesia Transfer of Care Note    Patient: Herminio Burger    Procedure(s) Performed: Procedure(s) (LRB):  TURP, WITHOUT USING LASER (N/A)  CYSTOSCOPY    Patient location: PACU    Anesthesia Type: general    Transport from OR: Transported from OR on 6-10 L/min O2 by face mask with adequate spontaneous ventilation    Post pain: adequate analgesia    Post assessment: no apparent anesthetic complications and tolerated procedure well    Post vital signs: stable    Level of consciousness: lethargic    Nausea/Vomiting: no nausea/vomiting    Complications: none    Transfer of care protocol was followed      Last vitals:   Visit Vitals  BP (!) 175/89 (BP Location: Left arm, Patient Position: Lying)   Pulse 68   Temp 36.7 °C (98 °F) (Oral)   Resp 18   Ht 5' 11" (1.803 m)   Wt 79.4 kg (175 lb)   SpO2 100%   BMI 24.41 kg/m²     "

## 2019-04-16 NOTE — NURSING TRANSFER
Nursing Transfer Note      4/16/2019     Transfer To: 524    Transfer via stretcher    Transfer with BELONGINGS    Transported by PCT    Medicines sent: NONE    Chart send with patient: Yes    Notified: spouse    Patient reassessed at: 04/16/19 1800 (date, time)    Upon arrival to floor: patient oriented to room, call bell in reach and bed in lowest position

## 2019-04-16 NOTE — OP NOTE
[facility]  Operative Note    Date: 04/16/2019    Pre-Op Diagnosis: BPH with bladder outlet obstruction  Patient Active Problem List    Diagnosis Date Noted    BPH (benign prostatic hyperplasia) 04/16/2019    Benign prostatic hyperplasia 04/04/2019    Nuclear sclerotic cataract of both eyes 03/18/2019    Hypertensive retinopathy of both eyes 03/18/2019    Meibomian gland dysfunction 03/18/2019    Malignant neoplasm of left lung 02/12/2019    Lung nodule 06/06/2018    Cervical spine arthritis 06/06/2018    History of small bowel obstruction 06/09/2017    History of hernia repair 06/09/2017    Atherosclerosis of aorta 06/09/2017    Essential hypertension 11/14/2016    Chronic obstructive pulmonary disease 11/14/2016    History of pneumothorax 02/27/2014      Post-Op Diagnosis: same    Procedure(s) Performed:   1. TURP    Specimen(s): prostate chips    Staff Surgeon: Marissa Staples MD    Assistant Surgeon: Fran Rivera MD    Anesthesia: General endotracheal anesthesia    Indications: Herminio Burger is a 73 y.o. male with BPH with urinary obstruction who presents for bipolar TURP    Findings:   - trilobar hyperplasia  - large median lobe    Estimated Blood Loss: minimal    Drains: 24 Fr 3-way kaplan catheter    Procedure in detail:  After the risks and benefits of the procedure were explained, consent was obtained, and the patient was taken to the operating suite and placed in the supine position.  Anesthesia was administered.  When adequately sedated, the patient was placed in dorsal lithotomy position and prepped and draped in normal sterile fashion.  Timeout was performed and preoperative ABx were confirmed.      A 28-[place] sheath resectoscope was placed into the bladder using a visual obturator. The visual obturator was exchanged for the resecting mechanism. The ureteral orifices were identified. The median lobe was first resected with care to avoid the ureteral orifices. Once the  median lobe was resected, we then turned our attention to the left lateral lobe of the prostate.The left lateral lobe was then resected in a stepwise fashion from 5 o'clock to 12 o'clock with care to leave the verumontanum and sphincter intact. Once this was performed we directed our attention to the right lobe of the prostate and again the lateral lobe was resected from the 7 o'clock to the 12 o'clock position. Hemostasis was then achieved.    The ureteral orifices, verumontanum and sphincter were intact. The [employer] evacuator was used to remove all prostate chips and again hemostasis was obtained.    Once the bladder was drained, we could see that he had an open channel and the scope was removed.  A 24 Fr 3-way catheter was placed in the bladder with 60 mL of water in the balloon. The patient was placed on traction and on CBI. The patient was transferred to recovery in stable condition.      Disposition: The patient will remain on the urology service overnight for observation and CBI will be titrated.    MD ANJALI Nickerson was present for the entire case and agree with the above note.

## 2019-04-17 VITALS
HEIGHT: 71 IN | DIASTOLIC BLOOD PRESSURE: 77 MMHG | WEIGHT: 175 LBS | SYSTOLIC BLOOD PRESSURE: 133 MMHG | BODY MASS INDEX: 24.5 KG/M2 | HEART RATE: 92 BPM | TEMPERATURE: 98 F | RESPIRATION RATE: 16 BRPM | OXYGEN SATURATION: 99 %

## 2019-04-17 LAB
ANION GAP SERPL CALC-SCNC: 8 MMOL/L (ref 8–16)
BASOPHILS # BLD AUTO: 0 K/UL (ref 0–0.2)
BASOPHILS NFR BLD: 0 % (ref 0–1.9)
BUN SERPL-MCNC: 15 MG/DL (ref 8–23)
CALCIUM SERPL-MCNC: 8.6 MG/DL (ref 8.7–10.5)
CHLORIDE SERPL-SCNC: 107 MMOL/L (ref 95–110)
CO2 SERPL-SCNC: 22 MMOL/L (ref 23–29)
CREAT SERPL-MCNC: 1.1 MG/DL (ref 0.5–1.4)
DIFFERENTIAL METHOD: ABNORMAL
EOSINOPHIL # BLD AUTO: 0 K/UL (ref 0–0.5)
EOSINOPHIL NFR BLD: 0 % (ref 0–8)
ERYTHROCYTE [DISTWIDTH] IN BLOOD BY AUTOMATED COUNT: 14.1 % (ref 11.5–14.5)
EST. GFR  (AFRICAN AMERICAN): >60 ML/MIN/1.73 M^2
EST. GFR  (NON AFRICAN AMERICAN): >60 ML/MIN/1.73 M^2
GLUCOSE SERPL-MCNC: 108 MG/DL (ref 70–110)
HCT VFR BLD AUTO: 41.9 % (ref 40–54)
HGB BLD-MCNC: 14.1 G/DL (ref 14–18)
IMM GRANULOCYTES # BLD AUTO: 0.01 K/UL (ref 0–0.04)
IMM GRANULOCYTES NFR BLD AUTO: 0.3 % (ref 0–0.5)
LYMPHOCYTES # BLD AUTO: 0.5 K/UL (ref 1–4.8)
LYMPHOCYTES NFR BLD: 13.6 % (ref 18–48)
MCH RBC QN AUTO: 30.7 PG (ref 27–31)
MCHC RBC AUTO-ENTMCNC: 33.7 G/DL (ref 32–36)
MCV RBC AUTO: 91 FL (ref 82–98)
MONOCYTES # BLD AUTO: 0.5 K/UL (ref 0.3–1)
MONOCYTES NFR BLD: 11.8 % (ref 4–15)
NEUTROPHILS # BLD AUTO: 2.8 K/UL (ref 1.8–7.7)
NEUTROPHILS NFR BLD: 74.3 % (ref 38–73)
NRBC BLD-RTO: 0 /100 WBC
PLATELET # BLD AUTO: 152 K/UL (ref 150–350)
PMV BLD AUTO: 9.8 FL (ref 9.2–12.9)
POTASSIUM SERPL-SCNC: 4.4 MMOL/L (ref 3.5–5.1)
RBC # BLD AUTO: 4.6 M/UL (ref 4.6–6.2)
SODIUM SERPL-SCNC: 137 MMOL/L (ref 136–145)
WBC # BLD AUTO: 3.81 K/UL (ref 3.9–12.7)

## 2019-04-17 PROCEDURE — 80048 BASIC METABOLIC PNL TOTAL CA: CPT | Mod: HCNC

## 2019-04-17 PROCEDURE — 25000003 PHARM REV CODE 250: Mod: HCNC | Performed by: STUDENT IN AN ORGANIZED HEALTH CARE EDUCATION/TRAINING PROGRAM

## 2019-04-17 PROCEDURE — 36415 COLL VENOUS BLD VENIPUNCTURE: CPT | Mod: HCNC

## 2019-04-17 PROCEDURE — 85025 COMPLETE CBC W/AUTO DIFF WBC: CPT | Mod: HCNC

## 2019-04-17 RX ORDER — LIDOCAINE HYDROCHLORIDE 20 MG/ML
JELLY TOPICAL
Status: DISCONTINUED | OUTPATIENT
Start: 2019-04-17 | End: 2019-04-17 | Stop reason: HOSPADM

## 2019-04-17 RX ORDER — HYDROCODONE BITARTRATE AND ACETAMINOPHEN 5; 325 MG/1; MG/1
1 TABLET ORAL EVERY 8 HOURS PRN
Qty: 15 TABLET | Refills: 0 | Status: SHIPPED | OUTPATIENT
Start: 2019-04-17 | End: 2019-09-18

## 2019-04-17 RX ADMIN — ACETAMINOPHEN 500 MG: 500 TABLET ORAL at 08:04

## 2019-04-17 RX ADMIN — AMLODIPINE BESYLATE 5 MG: 5 TABLET ORAL at 08:04

## 2019-04-17 RX ADMIN — OXYCODONE HYDROCHLORIDE 10 MG: 10 TABLET ORAL at 03:04

## 2019-04-17 RX ADMIN — LIDOCAINE HYDROCHLORIDE 10 ML: 20 JELLY TOPICAL at 04:04

## 2019-04-17 RX ADMIN — ACETAMINOPHEN 1000 MG: 500 TABLET ORAL at 02:04

## 2019-04-17 RX ADMIN — FAMOTIDINE 20 MG: 20 TABLET, FILM COATED ORAL at 08:04

## 2019-04-17 RX ADMIN — OXYCODONE HYDROCHLORIDE 10 MG: 10 TABLET ORAL at 08:04

## 2019-04-17 RX ADMIN — SODIUM CHLORIDE, SODIUM LACTATE, POTASSIUM CHLORIDE, AND CALCIUM CHLORIDE: .6; .31; .03; .02 INJECTION, SOLUTION INTRAVENOUS at 06:04

## 2019-04-17 RX ADMIN — ACETAMINOPHEN 1000 MG: 500 TABLET ORAL at 03:04

## 2019-04-17 RX ADMIN — OXYCODONE HYDROCHLORIDE 10 MG: 10 TABLET ORAL at 02:04

## 2019-04-17 NOTE — SUBJECTIVE & OBJECTIVE
Interval History:   Ambulated, tolerating diet, pain control improved.     Review of Systems  Objective:     Temp:  [96.3 °F (35.7 °C)-98 °F (36.7 °C)] 96.3 °F (35.7 °C)  Pulse:  [54-93] 80  Resp:  [14-30] 17  SpO2:  [92 %-100 %] 98 %  BP: (132-209)/() 142/68     Body mass index is 24.41 kg/m².           Drains     Drain                 Urethral Catheter 04/16/19 1330 Triple-lumen 24 Fr. less than 1 day                Physical Exam   Nursing note and vitals reviewed.  Constitutional: He is oriented to person, place, and time. He appears well-developed and well-nourished. No distress.   Cardiovascular: Normal rate.    Pulmonary/Chest: Effort normal. No accessory muscle usage. No respiratory distress.   Abdominal: Soft. He exhibits no distension. There is no tenderness.   Genitourinary:   Genitourinary Comments: 24 - way with CBI on slow drip draining clear, kaplan plugged this morning   Musculoskeletal: He exhibits no edema.   Neurological: He is alert and oriented to person, place, and time.       Significant Labs:    BMP:  Recent Labs   Lab 04/17/19  0527      K 4.4      CO2 22*   BUN 15   CREATININE 1.1   CALCIUM 8.6*       CBC:   Recent Labs   Lab 04/17/19  0527   WBC 3.81*   HGB 14.1   HCT 41.9          All pertinent labs results from the past 24 hours have been reviewed.    Significant Imaging:  All pertinent imaging results/findings from the past 24 hours have been reviewed.

## 2019-04-17 NOTE — PLAN OF CARE
Problem: Pain Acute  Goal: Optimal Pain Control    Intervention: Optimize Psychosocial Wellbeing  Pt waited too long before calling for pain med, continue to monitor.

## 2019-04-17 NOTE — PROGRESS NOTES
[facility]  Urology  Progress Note    Patient Name: Herminio Burger  MRN: [id number]  Admission Date: 4/16/2019  Hospital Length of Stay: 0 days  Code Status: Prior   Attending Provider: Marissa Staples MD   Primary Care Physician: Ted Paula MD    Subjective:     HPI:  Herminio Burger is a 73 y.o. male with BPH who underwent TURP on 4/16/19.    Interval History:   Ambulated, tolerating diet, pain control improved.     Review of Systems  Objective:     Temp:  [96.3 °F (35.7 °C)-98 °F (36.7 °C)] 96.3 °F (35.7 °C)  Pulse:  [54-93] 80  Resp:  [14-30] 17  SpO2:  [92 %-100 %] 98 %  BP: (132-209)/() 142/68     Body mass index is 24.41 kg/m².           Drains     Drain                 Urethral Catheter 04/16/19 1330 Triple-lumen 24 Fr. less than 1 day                Physical Exam   Nursing note and vitals reviewed.  Constitutional: He is oriented to person, place, and time. He appears well-developed and well-nourished. No distress.   Cardiovascular: Normal rate.    Pulmonary/Chest: Effort normal. No accessory muscle usage. No respiratory distress.   Abdominal: Soft. He exhibits no distension. There is no tenderness.   Genitourinary:   Genitourinary Comments: 24 - way with CBI on slow drip draining clear, kaplan plugged this morning   Musculoskeletal: He exhibits no edema.   Neurological: He is alert and oriented to person, place, and time.       Significant Labs:    BMP:  Recent Labs   Lab 04/17/19  0527      K 4.4      CO2 22*   BUN 15   CREATININE 1.1   CALCIUM 8.6*       CBC:   Recent Labs   Lab 04/17/19  0527   WBC 3.81*   HGB 14.1   HCT 41.9          All pertinent labs results from the past 24 hours have been reviewed.    Significant Imaging:  All pertinent imaging results/findings from the past 24 hours have been reviewed.          Assessment/Plan:     * BPH (benign prostatic hyperplasia)  - PO tylenol, PO oxycodone for pain control  - regular diet  - d/c MIVF  - CBC, BMP  stable  - Ambulate QID  - CBI - clamped this morning  - Drains: clamp CBI, check kaplan and remove this morning if remains clear or pink with voiding trial today  - Prophylaxis: IS, SCDs, GI ppx        VTE Risk Mitigation (From admission, onward)        Ordered     Place sequential compression device  Until discontinued      04/16/19 3209          Fran Rivera MD  Urology  [facility]

## 2019-04-17 NOTE — PROGRESS NOTES
Pt voided 250 of marroon color urine,bladder scan patient showed 301, thaddeus Hobson notified and stated to let patient void again and get bladder scan after voiding and call will results.

## 2019-04-17 NOTE — ASSESSMENT & PLAN NOTE
- PO tylenol, PO oxycodone for pain control  - regular diet  - d/c MIVF  - CBC, BMP stable  - Ambulate QID  - CBI - clamped this morning  - Drains: clamp CBI, check kaplan and remove this morning if remains clear or pink with voiding trial today  - Prophylaxis: IS, SCDs, GI ppx

## 2019-04-17 NOTE — DISCHARGE SUMMARY
[facility]  Discharge Note  Short Stay    Admit Date: 4/16/2019    Discharge Date and Time: 04/17/2019 1:45 PM      Attending Physician: Marissa Staples MD     Discharge Provider: Nilson Adler    Diagnoses:  Active Hospital Problems    Diagnosis  POA    *BPH (benign prostatic hyperplasia) [N40.0]  Yes    Benign prostatic hyperplasia [N40.0]  Yes    Nuclear sclerotic cataract of both eyes [H25.13]  Yes    Hypertensive retinopathy of both eyes [H35.033]  Yes    Malignant neoplasm of left lung [C34.92]  Yes    Cervical spine arthritis [M47.812]  Yes    History of small bowel obstruction [Z87.19]  Not Applicable    History of hernia repair [Z98.890, Z87.19]  Not Applicable    Atherosclerosis of aorta [I70.0]  Yes     Noted on imaging 5/23/2016.      Essential hypertension [I10]  Yes    Chronic obstructive pulmonary disease [J44.9]  Yes    History of pneumothorax [Z87.09]  Not Applicable      Resolved Hospital Problems   No resolved problems to display.       Discharged Condition: good    Hospital Course: Patient was admitted for TURP on 4/16/19 and tolerated the procedure well with no complications. He was kept overnight on continuous bladder irrigation. The following morning the Akers was removed, and the patient was able to void spontaneously. The patient was discharged home in good condition on 4/17/19.       Final Diagnoses: Same as principal problem.    Disposition: Home or Self Care    Follow up/Patient Instructions:    Medications:  Reconciled Home Medications:   Current Discharge Medication List      START taking these medications    Details   polyethylene glycol (GLYCOLAX) 17 gram/dose powder Take 17 g by mouth once daily.  Qty: 1 Bottle, Refills: 0         CONTINUE these medications which have CHANGED    Details   HYDROcodone-acetaminophen (NORCO) 5-325 mg per tablet Take 1 tablet by mouth every 8 (eight) hours as needed for Pain.  Qty: 15 tablet, Refills: 0    Associated Diagnoses:  Arthritis of neck         CONTINUE these medications which have NOT CHANGED    Details   albuterol 90 mcg/actuation inhaler Inhale 2 puffs into the lungs every 6 (six) hours as needed for Wheezing.  Qty: 18 g, Refills: 11    Associated Diagnoses: COPD exacerbation      amLODIPine (NORVASC) 5 MG tablet Take 1 tablet (5 mg total) by mouth once daily.  Qty: 90 tablet, Refills: 11    Associated Diagnoses: Essential hypertension      diphenhydrAMINE (BENADRYL) 25 mg capsule Take 1 each (25 mg total) by mouth every 6 (six) hours as needed for Itching.  Qty: 30 capsule, Refills: 1      ibuprofen (ADVIL,MOTRIN) 200 MG tablet Take 200 mg by mouth 2 (two) times daily as needed for Pain.      losartan (COZAAR) 100 MG tablet TAKE 1 TABLET BY MOUTH EVERY DAY  Qty: 90 tablet, Refills: 11    Associated Diagnoses: Essential hypertension      tamsulosin (FLOMAX) 0.4 mg Cap Take 0.4 mg by mouth once daily.      triamcinolone acetonide 0.1% (KENALOG) 0.1 % ointment YEIMY TO RASH ON BODY BID PRN FOR ITCHING AND FLARES  Refills: 1      vitamin E 1000 UNIT capsule Take 1,000 Units by mouth once daily.           Discharge Procedure Orders   Diet general     Call MD for:  temperature >100.4     Call MD for:  persistent nausea and vomiting or diarrhea     Call MD for:  severe uncontrolled pain     Call MD for:  difficulty breathing or increased cough     Call MD for:  severe persistent headache     Call MD for:  persistent dizziness, light-headedness, or visual disturbances     Call MD for:  increased confusion or weakness     No dressing needed     Call MD for:  temperature >100.4     Call MD for:  persistent nausea and vomiting     Call MD for:  severe uncontrolled pain     Call MD for:  difficulty breathing, headache or visual disturbances     Call MD for:  redness, tenderness, or signs of infection (pain, swelling, redness, odor or green/yellow discharge around incision site)     Call MD for:  extreme fatigue     Call MD for:  persistent  dizziness or light-headedness     Call MD for:  hives     Activity as tolerated     Activity as tolerated     Follow-up Information     Marissa Staples MD On 5/1/2019.    Specialty:  Urology  Why:  Post-op follow up appointment s/p TURP  Contact information:  [address]  [facility] [id number] [phone]                 Patient was seen on rounds.  Had urinated once, had some dysuria (as expected)  His abd was soft.    Follow up in 2 weeks as previously scheduled.

## 2019-04-17 NOTE — PROGRESS NOTES
Discharge teaching: Pt discharged home alert and oriented x4, Pt voiding without difficulty. Skin warm to touch, pt verbalized understanding of discharge teaching. Wife at bedside.

## 2019-04-17 NOTE — PLAN OF CARE
Problem: Adult Inpatient Plan of Care  Goal: Plan of Care Review  Outcome: Ongoing (interventions implemented as appropriate)  Patient AAOx4 and VSS throughout the night. Q2H rounding and white board updating maintained. Call bell within reach. Pain well controlled with PRN medication. Patient ambulated in serrano well with one assist. CBI clamped in morning.

## 2019-04-27 ENCOUNTER — OFFICE VISIT (OUTPATIENT)
Dept: URGENT CARE | Facility: CLINIC | Age: 74
End: 2019-04-27
Payer: MEDICARE

## 2019-04-27 VITALS
SYSTOLIC BLOOD PRESSURE: 133 MMHG | BODY MASS INDEX: 24.5 KG/M2 | RESPIRATION RATE: 15 BRPM | WEIGHT: 175 LBS | DIASTOLIC BLOOD PRESSURE: 82 MMHG | HEART RATE: 89 BPM | HEIGHT: 71 IN | TEMPERATURE: 99 F | OXYGEN SATURATION: 98 %

## 2019-04-27 DIAGNOSIS — J04.0 LARYNGITIS: Primary | ICD-10-CM

## 2019-04-27 DIAGNOSIS — J06.9 UPPER RESPIRATORY TRACT INFECTION, UNSPECIFIED TYPE: ICD-10-CM

## 2019-04-27 PROCEDURE — 1101F PR PT FALLS ASSESS DOC 0-1 FALLS W/OUT INJ PAST YR: ICD-10-PCS | Mod: CPTII,S$GLB,, | Performed by: NURSE PRACTITIONER

## 2019-04-27 PROCEDURE — 3075F PR MOST RECENT SYSTOLIC BLOOD PRESS GE 130-139MM HG: ICD-10-PCS | Mod: CPTII,S$GLB,, | Performed by: NURSE PRACTITIONER

## 2019-04-27 PROCEDURE — 3079F PR MOST RECENT DIASTOLIC BLOOD PRESSURE 80-89 MM HG: ICD-10-PCS | Mod: CPTII,S$GLB,, | Performed by: NURSE PRACTITIONER

## 2019-04-27 PROCEDURE — 3075F SYST BP GE 130 - 139MM HG: CPT | Mod: CPTII,S$GLB,, | Performed by: NURSE PRACTITIONER

## 2019-04-27 PROCEDURE — 99214 OFFICE O/P EST MOD 30 MIN: CPT | Mod: S$GLB,,, | Performed by: NURSE PRACTITIONER

## 2019-04-27 PROCEDURE — 3079F DIAST BP 80-89 MM HG: CPT | Mod: CPTII,S$GLB,, | Performed by: NURSE PRACTITIONER

## 2019-04-27 PROCEDURE — 99214 PR OFFICE/OUTPT VISIT, EST, LEVL IV, 30-39 MIN: ICD-10-PCS | Mod: S$GLB,,, | Performed by: NURSE PRACTITIONER

## 2019-04-27 PROCEDURE — 1101F PT FALLS ASSESS-DOCD LE1/YR: CPT | Mod: CPTII,S$GLB,, | Performed by: NURSE PRACTITIONER

## 2019-04-27 RX ORDER — CODEINE PHOSPHATE AND GUAIFENESIN 10; 100 MG/5ML; MG/5ML
5 SOLUTION ORAL NIGHTLY PRN
Qty: 236 ML | Refills: 0 | Status: SHIPPED | OUTPATIENT
Start: 2019-04-27 | End: 2020-03-10 | Stop reason: SDUPTHER

## 2019-04-27 NOTE — PATIENT INSTRUCTIONS
FOLLOW UP CLOSELY WITH YOUR PCP IF ANYTHING CHANGES.     You must understand that you've received an Urgent Care treatment only and that you may be released before all your medical problems are known or treated. You, the patient, will arrange for follow up care as instructed.  If your condition worsens we recommend that you receive another evaluation at the emergency room immediately or contact your primary medical clinics after hours call service to discuss your concerns.  Please return here or go to the Emergency Department for any concerns or worsening of condition.      Laryngitis    Laryngitis is a swelling of the tissues around the vocal cords. Symptoms include a hoarse (scratchy) voice. The voice may be lost completely. It may be caused by a viral illness, such as a head or chest cold. It may also be due to overuse and strain of the voice. Smoking, drinking alcohol, acid reflux, allergies, or inhaling harsh chemicals may also lead to symptoms. This condition will usually resolve in 1-2 weeks.  Home care  · Rest your voice until it recovers. Talk as little as possible. If your symptoms are severe, rest at home for a day or so.  · Breathing cool steam from a humidifier/vaporizer or in a steamy shower may be helpful.  · Drink plenty of fluids to stay well hydrated.  · Do not smoke  Follow-up care  Follow up with your healthcare provider or this facility if you have not improved after one week.  When to seek medical advice  Contact your healthcare provider for any of the following:  · Severe pain with swallowing  · Trouble opening mouth  · Neck swelling, neck pain, or trouble moving neck  · Noisy breathing or trouble breathing  · Fever of 100.4°F (38.ºC) or higher, or as directed by your healthcare provider  · Drooling  · Symptoms do not resolve in 2 weeks  Date Last Reviewed: 4/26/2015  © 3784-6018 [employer]. [address] [place] [address], [place] [address]. All rights reserved. This information is not  intended as a substitute for professional medical care. Always follow your healthcare professional's instructions.

## 2019-04-27 NOTE — PROGRESS NOTES
"Subjective:       Patient ID: Herminio Burger is a 73 y.o. male.    Vitals:  height is 5' 11" (1.803 m) and weight is 79.4 kg (175 lb). His temperature is 98.9 °F (37.2 °C). His blood pressure is 133/82 and his pulse is 89. His respiration is 15 and oxygen saturation is 98%.     Chief Complaint: URI    No fever or chills.     URI    This is a new problem. The current episode started in the past 7 days (3-4 days). The problem has been gradually worsening. There has been no fever. Associated symptoms include coughing, a sore throat and swollen glands. Pertinent negatives include no congestion, ear pain, nausea, rash, sinus pain, vomiting or wheezing.       Constitution: Negative for chills, sweating, fatigue and fever.   HENT: Positive for sore throat, trouble swallowing and voice change. Negative for ear pain, congestion, sinus pain and sinus pressure.    Neck: Negative for painful lymph nodes.   Eyes: Negative for eye redness.   Respiratory: Positive for cough. Negative for chest tightness, sputum production, bloody sputum, COPD, shortness of breath, stridor, wheezing and asthma.    Gastrointestinal: Negative for nausea and vomiting.   Musculoskeletal: Negative for muscle ache.   Skin: Negative for rash.   Allergic/Immunologic: Negative for seasonal allergies and asthma.   Hematologic/Lymphatic: Negative for swollen lymph nodes.       Objective:      Physical Exam   Constitutional: He is oriented to person, place, and time. He appears well-developed and well-nourished. He is cooperative.  Non-toxic appearance. He does not appear ill. No distress.   HENT:   Head: Normocephalic and atraumatic.   Right Ear: Hearing, tympanic membrane, external ear and ear canal normal.   Left Ear: Hearing, tympanic membrane, external ear and ear canal normal.   Nose: Nose normal. No mucosal edema, rhinorrhea or nasal deformity. No epistaxis. Right sinus exhibits no maxillary sinus tenderness and no frontal sinus tenderness. Left sinus " exhibits no maxillary sinus tenderness and no frontal sinus tenderness.   Mouth/Throat: Uvula is midline, oropharynx is clear and moist and mucous membranes are normal. No trismus in the jaw. Normal dentition. No uvula swelling. No posterior oropharyngeal erythema.   Hoarseness noted   Eyes: Conjunctivae and lids are normal. No scleral icterus.   Sclera clear bilat   Neck: Trachea normal, full passive range of motion without pain and phonation normal. Neck supple.   Cardiovascular: Normal rate, regular rhythm, normal heart sounds, intact distal pulses and normal pulses.   Pulmonary/Chest: Effort normal and breath sounds normal. No respiratory distress.   Abdominal: Soft. Normal appearance and bowel sounds are normal. He exhibits no distension. There is no tenderness.   Musculoskeletal: Normal range of motion. He exhibits no edema or deformity.   Neurological: He is alert and oriented to person, place, and time. He exhibits normal muscle tone. Coordination normal.   Skin: Skin is warm, dry and intact. He is not diaphoretic. No pallor.   Psychiatric: He has a normal mood and affect. His speech is normal and behavior is normal. Judgment and thought content normal. Cognition and memory are normal.   Nursing note and vitals reviewed.      Assessment:       1. Laryngitis    2. Upper respiratory tract infection, unspecified type        Plan:         Patient has had codeine cough syrup before by his primary care physician.  Educated patient that I will make him drowsy and given precautions. Verb an understanding.     Laryngitis    Upper respiratory tract infection, unspecified type  -     (Magic mouthwash) 1:1:1 Benadryl 12.5mg/5ml liq, aluminum & magnesium hydroxide-simehticone (Maalox), lidocaine viscous 2%; Swish and spit 10 mLs every 4 (four) hours as needed. for sore throat  Dispense: 360 mL; Refill: 0  -     guaifenesin-codeine 100-10 mg/5 ml (TUSSI-ORGANIDIN NR)  mg/5 mL syrup; Take 5 mLs by mouth nightly as  needed.  Dispense: 236 mL; Refill: 0            Patient Instructions   FOLLOW UP CLOSELY WITH YOUR PCP IF ANYTHING CHANGES.     You must understand that you've received an Urgent Care treatment only and that you may be released before all your medical problems are known or treated. You, the patient, will arrange for follow up care as instructed.  If your condition worsens we recommend that you receive another evaluation at the emergency room immediately or contact your primary medical clinics after hours call service to discuss your concerns.  Please return here or go to the Emergency Department for any concerns or worsening of condition.      Laryngitis    Laryngitis is a swelling of the tissues around the vocal cords. Symptoms include a hoarse (scratchy) voice. The voice may be lost completely. It may be caused by a viral illness, such as a head or chest cold. It may also be due to overuse and strain of the voice. Smoking, drinking alcohol, acid reflux, allergies, or inhaling harsh chemicals may also lead to symptoms. This condition will usually resolve in 1-2 weeks.  Home care  · Rest your voice until it recovers. Talk as little as possible. If your symptoms are severe, rest at home for a day or so.  · Breathing cool steam from a humidifier/vaporizer or in a steamy shower may be helpful.  · Drink plenty of fluids to stay well hydrated.  · Do not smoke  Follow-up care  Follow up with your healthcare provider or this facility if you have not improved after one week.  When to seek medical advice  Contact your healthcare provider for any of the following:  · Severe pain with swallowing  · Trouble opening mouth  · Neck swelling, neck pain, or trouble moving neck  · Noisy breathing or trouble breathing  · Fever of 100.4°F (38.ºC) or higher, or as directed by your healthcare provider  · Drooling  · Symptoms do not resolve in 2 weeks  Date Last Reviewed: 4/26/2015  © 6100-4818 [employer]. [address] [place] [address]  Road, [place] [address]. All rights reserved. This information is not intended as a substitute for professional medical care. Always follow your healthcare professional's instructions.

## 2019-04-29 ENCOUNTER — TELEPHONE (OUTPATIENT)
Dept: UROLOGY | Facility: CLINIC | Age: 74
End: 2019-04-29

## 2019-05-01 ENCOUNTER — OFFICE VISIT (OUTPATIENT)
Dept: UROLOGY | Facility: CLINIC | Age: 74
End: 2019-05-01
Payer: MEDICARE

## 2019-05-01 VITALS
HEART RATE: 79 BPM | HEIGHT: 71 IN | DIASTOLIC BLOOD PRESSURE: 79 MMHG | BODY MASS INDEX: 24.53 KG/M2 | SYSTOLIC BLOOD PRESSURE: 151 MMHG | WEIGHT: 175.25 LBS

## 2019-05-01 DIAGNOSIS — R35.1 NOCTURIA: ICD-10-CM

## 2019-05-01 DIAGNOSIS — Z98.890 POST-OPERATIVE STATE: ICD-10-CM

## 2019-05-01 DIAGNOSIS — J02.9 SORE THROAT: ICD-10-CM

## 2019-05-01 DIAGNOSIS — R39.15 URINARY URGENCY: Primary | ICD-10-CM

## 2019-05-01 PROCEDURE — 99999 PR PBB SHADOW E&M-EST. PATIENT-LVL III: ICD-10-PCS | Mod: PBBFAC,HCNC,, | Performed by: UROLOGY

## 2019-05-01 PROCEDURE — 99024 POSTOP FOLLOW-UP VISIT: CPT | Mod: HCNC,S$GLB,, | Performed by: UROLOGY

## 2019-05-01 PROCEDURE — 99999 PR PBB SHADOW E&M-EST. PATIENT-LVL III: CPT | Mod: PBBFAC,HCNC,, | Performed by: UROLOGY

## 2019-05-01 PROCEDURE — 81002 URINALYSIS NONAUTO W/O SCOPE: CPT | Mod: HCNC,S$GLB,, | Performed by: UROLOGY

## 2019-05-01 PROCEDURE — 99024 PR POST-OP FOLLOW-UP VISIT: ICD-10-PCS | Mod: HCNC,S$GLB,, | Performed by: UROLOGY

## 2019-05-01 PROCEDURE — 81002 PR URINALYSIS NONAUTO W/O SCOPE: ICD-10-PCS | Mod: HCNC,S$GLB,, | Performed by: UROLOGY

## 2019-05-02 NOTE — PROGRESS NOTES
CHIEF COMPLAINT:    Mr. Burger is a 73 y.o. male presenting for a follow up after TURP on 4/16/2019.    PRESENTING ILLNESS:    Herminio Burger is a 73 y.o. male who returns for follow up.  He states that he continues to have gross hematuria the color of pink lemonade.  He has been drinking copious amounts of water (minimum 80 oz) to flush out his bladder and as a result he has urgency, frequency, occasional urge incontinence and nocturia x 3-4.  He also has perineal pain if he presses on the perineum along with pain that radiates to the glans penis.  No dysuria, no malodorous urine.  He has no stress incontinence.      He has had constipation post op.  Has taken miralax daily but had to double up on the dose in order to have a bm.   Has done this twice since surgery    He states he has a sore throat that did not occur right after the surgery, but happened a week or two later.  He went to urgent care and tested negative for strep however, he finds he has discomfort if he clears his throat and has ongoing pain with swallowing.     Allergies:  Patient has no known allergies.    Medications:  Outpatient Encounter Medications as of 5/1/2019   Medication Sig Dispense Refill    (Magic mouthwash) 1:1:1 Benadryl 12.5mg/5ml liq, aluminum & magnesium hydroxide-simehticone (Maalox), lidocaine viscous 2% Swish and spit 10 mLs every 4 (four) hours as needed. for sore throat 360 mL 0    amLODIPine (NORVASC) 5 MG tablet Take 1 tablet (5 mg total) by mouth once daily. 90 tablet 11    diphenhydrAMINE (BENADRYL) 25 mg capsule Take 1 each (25 mg total) by mouth every 6 (six) hours as needed for Itching. 30 capsule 1    HYDROcodone-acetaminophen (NORCO) 5-325 mg per tablet Take 1 tablet by mouth every 8 (eight) hours as needed for Pain. 15 tablet 0    ibuprofen (ADVIL,MOTRIN) 200 MG tablet Take 200 mg by mouth 2 (two) times daily as needed for Pain.      losartan (COZAAR) 100 MG tablet TAKE 1 TABLET BY MOUTH EVERY DAY 90 tablet 11     tamsulosin (FLOMAX) 0.4 mg Cap Take 0.4 mg by mouth once daily.      triamcinolone acetonide 0.1% (KENALOG) 0.1 % ointment YEIMY TO RASH ON BODY BID PRN FOR ITCHING AND FLARES  1    vitamin E 1000 UNIT capsule Take 1,000 Units by mouth once daily.      albuterol 90 mcg/actuation inhaler Inhale 2 puffs into the lungs every 6 (six) hours as needed for Wheezing. 18 g 11    guaifenesin-codeine 100-10 mg/5 ml (TUSSI-ORGANIDIN NR)  mg/5 mL syrup Take 5 mLs by mouth nightly as needed. 236 mL 0    mirabegron (MYRBETRIQ) 50 mg Tb24 Take 1 tablet (50 mg total) by mouth once daily. 30 tablet 2     No facility-administered encounter medications on file as of 5/1/2019.          PHYSICAL EXAMINATION:    The patient generally appears in good health, is appropriately interactive, and is in no apparent distress.    Skin: No lesions.    Mental: Cooperative with normal affect.    Neuro: Grossly intact.    HEENT: Normal. No evidence of lymphadenopathy.    Chest:  normal inspiratory effort.    Extremities: No clubbing, cyanosis, or edema      LABS:    Lab Results   Component Value Date    BUN 15 04/17/2019    CREATININE 1.1 04/17/2019     UA 1.005, pH 7, + leuk, 30 protein, 250 blood, otherwise, negative      IMPRESSION:    Encounter Diagnoses   Name Primary?    Urinary urgency Yes    Nocturia     Sore throat     Post-operative state        PLAN:    1.  Decrease fluid intake to 64 oz  2.  Reassured him about the gross hematuria  3.  Tylenol for pain as he cannot take NSAID's secondary to PUD  4.  Referred to ENT due to the sore throat  5.  Myrbetriq may be tried for the nocturia, though I suspect the majority is due to the fluid intake  6.  Add MOM to regimen for constipation  7.  Follow up in 6 weeks.  8.  OK to discontinue the Flomax, if he finds that it was effective, then take the first dose at bedtime as there is a risk of first dose syncope with interruption of the medication

## 2019-05-14 ENCOUNTER — OFFICE VISIT (OUTPATIENT)
Dept: INTERNAL MEDICINE | Facility: CLINIC | Age: 74
End: 2019-05-14
Payer: MEDICARE

## 2019-05-14 VITALS
SYSTOLIC BLOOD PRESSURE: 128 MMHG | TEMPERATURE: 98 F | DIASTOLIC BLOOD PRESSURE: 72 MMHG | HEIGHT: 71 IN | WEIGHT: 172.38 LBS | OXYGEN SATURATION: 98 % | BODY MASS INDEX: 24.13 KG/M2 | HEART RATE: 96 BPM

## 2019-05-14 DIAGNOSIS — N45.1 EPIDIDYMITIS: Primary | ICD-10-CM

## 2019-05-14 PROCEDURE — 3074F PR MOST RECENT SYSTOLIC BLOOD PRESSURE < 130 MM HG: ICD-10-PCS | Mod: HCNC,CPTII,S$GLB, | Performed by: INTERNAL MEDICINE

## 2019-05-14 PROCEDURE — 3078F PR MOST RECENT DIASTOLIC BLOOD PRESSURE < 80 MM HG: ICD-10-PCS | Mod: HCNC,CPTII,S$GLB, | Performed by: INTERNAL MEDICINE

## 2019-05-14 PROCEDURE — 1101F PR PT FALLS ASSESS DOC 0-1 FALLS W/OUT INJ PAST YR: ICD-10-PCS | Mod: HCNC,CPTII,S$GLB, | Performed by: INTERNAL MEDICINE

## 2019-05-14 PROCEDURE — 99213 PR OFFICE/OUTPT VISIT, EST, LEVL III, 20-29 MIN: ICD-10-PCS | Mod: HCNC,S$GLB,, | Performed by: INTERNAL MEDICINE

## 2019-05-14 PROCEDURE — 3074F SYST BP LT 130 MM HG: CPT | Mod: HCNC,CPTII,S$GLB, | Performed by: INTERNAL MEDICINE

## 2019-05-14 PROCEDURE — 99213 OFFICE O/P EST LOW 20 MIN: CPT | Mod: HCNC,S$GLB,, | Performed by: INTERNAL MEDICINE

## 2019-05-14 PROCEDURE — 1101F PT FALLS ASSESS-DOCD LE1/YR: CPT | Mod: HCNC,CPTII,S$GLB, | Performed by: INTERNAL MEDICINE

## 2019-05-14 PROCEDURE — 3078F DIAST BP <80 MM HG: CPT | Mod: HCNC,CPTII,S$GLB, | Performed by: INTERNAL MEDICINE

## 2019-05-14 PROCEDURE — 99999 PR PBB SHADOW E&M-EST. PATIENT-LVL IV: ICD-10-PCS | Mod: PBBFAC,HCNC,, | Performed by: INTERNAL MEDICINE

## 2019-05-14 PROCEDURE — 99999 PR PBB SHADOW E&M-EST. PATIENT-LVL IV: CPT | Mod: PBBFAC,HCNC,, | Performed by: INTERNAL MEDICINE

## 2019-05-14 RX ORDER — HYDROCODONE BITARTRATE AND ACETAMINOPHEN 5; 325 MG/1; MG/1
1 TABLET ORAL EVERY 6 HOURS PRN
Qty: 30 TABLET | Refills: 0 | Status: SHIPPED | OUTPATIENT
Start: 2019-05-14 | End: 2019-06-21 | Stop reason: SDUPTHER

## 2019-05-14 RX ORDER — CIPROFLOXACIN 500 MG/1
500 TABLET ORAL 2 TIMES DAILY
Qty: 20 TABLET | Refills: 0 | Status: SHIPPED | OUTPATIENT
Start: 2019-05-14 | End: 2019-05-24

## 2019-05-14 NOTE — PROGRESS NOTES
"Subjective:       Patient ID: Herminio Burger is a 73 y.o. male.    Chief Complaint: Testicle Pain    Complains of intermittent pain and swelling in scrotum, sometimes left, sometimes right and sometimes bilat.  No injury.  "cord" above testis gets swollen.  No urinary problems    Review of Systems   Constitutional: Negative for activity change, appetite change and fever.   HENT: Negative for congestion, postnasal drip and sore throat.    Respiratory: Negative for cough, shortness of breath and wheezing.    Cardiovascular: Negative for chest pain and palpitations.   Gastrointestinal: Negative for abdominal pain, blood in stool, constipation, diarrhea, nausea and vomiting.   Genitourinary: Negative for decreased urine volume, difficulty urinating, flank pain and frequency.   Musculoskeletal: Negative for arthralgias.   Neurological: Negative for dizziness, weakness and headaches.       Objective:      Physical Exam   Genitourinary: Left testis shows swelling and tenderness.       Assessment:       1. Epididymitis        Plan:   Herminio was seen today for testicle pain.    Diagnoses and all orders for this visit:    Epididymitis  -     US Scrotum And Testicles; Future  -     Ambulatory consult to Urology    Other orders  -     ciprofloxacin HCl (CIPRO) 500 MG tablet; Take 1 tablet (500 mg total) by mouth 2 (two) times daily. for 10 days  -     HYDROcodone-acetaminophen (NORCO) 5-325 mg per tablet; Take 1 tablet by mouth every 6 (six) hours as needed for Pain.      "

## 2019-05-16 ENCOUNTER — HOSPITAL ENCOUNTER (OUTPATIENT)
Dept: RADIOLOGY | Facility: HOSPITAL | Age: 74
Discharge: HOME | End: 2019-05-16
Attending: INTERNAL MEDICINE
Payer: MEDICARE

## 2019-05-16 DIAGNOSIS — N45.1 EPIDIDYMITIS: ICD-10-CM

## 2019-05-16 PROCEDURE — 76870 US EXAM SCROTUM: CPT | Mod: TC,HCNC

## 2019-05-16 PROCEDURE — 76870 US EXAM SCROTUM: CPT | Mod: 26,HCNC,, | Performed by: RADIOLOGY

## 2019-05-16 PROCEDURE — 76870 US SCROTUM AND TESTICLES: ICD-10-PCS | Mod: 26,HCNC,, | Performed by: RADIOLOGY

## 2019-05-21 ENCOUNTER — OFFICE VISIT (OUTPATIENT)
Dept: UROLOGY | Facility: CLINIC | Age: 74
End: 2019-05-21
Payer: MEDICARE

## 2019-05-21 VITALS
HEART RATE: 83 BPM | SYSTOLIC BLOOD PRESSURE: 144 MMHG | BODY MASS INDEX: 24.81 KG/M2 | DIASTOLIC BLOOD PRESSURE: 85 MMHG | HEIGHT: 71 IN | WEIGHT: 177.25 LBS

## 2019-05-21 DIAGNOSIS — R30.0 DYSURIA: ICD-10-CM

## 2019-05-21 DIAGNOSIS — Z98.890 POST-OPERATIVE STATE: ICD-10-CM

## 2019-05-21 DIAGNOSIS — N45.1 LEFT EPIDIDYMITIS: ICD-10-CM

## 2019-05-21 DIAGNOSIS — Z90.79 S/P TURP: ICD-10-CM

## 2019-05-21 DIAGNOSIS — N50.82 SCROTAL PAIN: Primary | ICD-10-CM

## 2019-05-21 DIAGNOSIS — I86.1 LEFT VARICOCELE: ICD-10-CM

## 2019-05-21 LAB
BILIRUB SERPL-MCNC: NORMAL MG/DL
BLOOD URINE, POC: 250
COLOR, POC UA: NORMAL
GLUCOSE UR QL STRIP: NORMAL
KETONES UR QL STRIP: NORMAL
LEUKOCYTE ESTERASE URINE, POC: NORMAL
NITRITE, POC UA: NORMAL
PH, POC UA: 6
PROTEIN, POC: NORMAL
SPECIFIC GRAVITY, POC UA: 1
UROBILINOGEN, POC UA: NORMAL

## 2019-05-21 PROCEDURE — 81002 URINALYSIS NONAUTO W/O SCOPE: CPT | Mod: S$GLB,,, | Performed by: NURSE PRACTITIONER

## 2019-05-21 PROCEDURE — 81002 PR URINALYSIS NONAUTO W/O SCOPE: ICD-10-PCS | Mod: S$GLB,,, | Performed by: NURSE PRACTITIONER

## 2019-05-21 PROCEDURE — 99024 PR POST-OP FOLLOW-UP VISIT: ICD-10-PCS | Mod: S$GLB,,, | Performed by: NURSE PRACTITIONER

## 2019-05-21 PROCEDURE — 99999 PR PBB SHADOW E&M-EST. PATIENT-LVL IV: ICD-10-PCS | Mod: PBBFAC,HCNC,, | Performed by: NURSE PRACTITIONER

## 2019-05-21 PROCEDURE — 99999 PR PBB SHADOW E&M-EST. PATIENT-LVL IV: CPT | Mod: PBBFAC,HCNC,, | Performed by: NURSE PRACTITIONER

## 2019-05-21 PROCEDURE — 99024 POSTOP FOLLOW-UP VISIT: CPT | Mod: S$GLB,,, | Performed by: NURSE PRACTITIONER

## 2019-05-21 PROCEDURE — 87086 URINE CULTURE/COLONY COUNT: CPT | Mod: HCNC

## 2019-05-21 NOTE — PROGRESS NOTES
CHIEF COMPLAINT:    Mr. Burger is a 73 y.o. male presenting for dysuria, and L scrotal pain.    PRESENTING ILLNESS:    Herminio Burger is a 73 y.o. male new patient to me (records of past medical, family and social history personally reviewed by me), w/ h/o HTN, and BPH w/ obstruction s/p TURP on 4/16/19 w/ Dr. Staples.  Findings:   - trilobar hyperplasia  - large median lobe.    Last seen in clinic 5/1/19 w/ Dr. Staples.    Scrotal US on 5/14/19 was unremarkable.    Today pt returns to clinic w/ female spouse reporting L scrotal pain, and dysuria. Reports onset of L scrotal pain x1-2 wks, recently visited UC, and rx'd ciprofloxacin 500mg bid x10 days. He was also rx'd hydrocodone which he did not use; denies need. Denies recent injury/trauma. Reports sxs have improved. was 10/10, but now 4-5/10. Also, reports burning w/ urination since his TURP. Denies GH, frequency, urgency, nocturia, obstructive urinary sxs, f/c, n/v, abd/pelvic/flank pain. FOS is strong. Denies split stream.    UA dip in clinic today revealed ++leuks, trace protein, 250 blood.    REVIEW OF SYSTEMS:    Herminio Burger denies headache, blurred vision, fever, nausea, vomiting, chills, abdominal pain, bleeding per rectum, cough, SOB, recent loss of consciousness, recent mental status changes, seizures, dizziness, or upper or lower extremity weakness.    PATIENT HISTORY:    Past Medical History:   Diagnosis Date    Asthma     Bilateral carotid artery disease 6/6/2018    Cervical spine arthritis 6/6/2018    Colon necrosis     ischemia    Colon polyps     COPD (chronic obstructive pulmonary disease)     DVT (deep venous thrombosis)     right arm resolved    Elevated liver enzymes     Emphysema     Gallstones     Herniated disc     Hypertension     Ileostomy in place     Ischemic colitis     Screening for colon cancer        Past Surgical History:   Procedure Laterality Date    ABDOMINAL REEXPLORATION      ABDOMINAL SURGERY       BRONCHOSCOPY-FIBEROPTIC N/A 2/27/2014    [facility] at [facility] 2[facility]    CHOLECYSTECTOMY      CHOLECYSTECTOMY N/A 11/11/2014    Performed by Adrian [facility] at [facility]    CLOSURE-ILEOSTOMY N/A 11/11/2014    Performed by [facility]see Foster [facility] at [facility]    COLON SURGERY      ilesostomy closure    COLONOSCOPY N/A 10/6/2014    Performed by [facility] at [facility] ENDO (4TH FLR)    COLONOSCOPY N/A 3/3/2014    Performed by [facility] at [facility] END[facility] (2ND FLR)    COLONOSCOPY W/ POLYPECTOMY      COLOSTOMY      CREATION, ILEOSTOMY Right 3/3/2014    [facility]formed [facility] at [facility] OR 2ND [facility]LR    CYSTOSCOPY  4/16/2019    [facility] at [facility]    EGD (ESOPHAGOGASTRODUODENOSCOPY) N/A 4/17/2014    Performed by Hero Palacio [facility] at [facility] ENDO (2ND FLR)    ERCP N/A 11/7/2014    Performed by Berny Corbett [facility] at [facility] ENDO (2ND FLR)    ESOPHAGOGASTRODUODENOSCOPY (EGD) N/A 10/28/2014    Performed by Raul [facility]ckinley [facility] at [facility] ENDO (4TH FLR)    EXPLORATORY-LAPAROTOMY N/A 3/19/2014    Performed by Bill Vee [facility] at [facility] OR 2ND FLR    EXPLORATORY-LAPAROTOMY N/A 3/3/2014    Performed by [facility]see Fostre [facility] at [facility] OR 2ND FLR    GASTROSTOMY TUBE PLACEMENT      REPAIR-HERNIA-VENTRAL N/A 11/11/2014    Performed by Adrian [facility] at [facility] OR 2ND FLR    scalp lac       1970    SHOULDER ARTHROSCOPY      TONSILLECTOMY      TURP, WITHOUT USING LASER N/A 4/16/2019    [facility] at [facility]LR    ULTRASOUND-ENDOSCOPIC-UPPER N/A 11/7/2014    Performed by Berny Corbett [facility] at [facility] ENDO (2ND FLR)    VATS Right     VATS (VIDEO-ASSISTED THORACOSCOPIC SURGERY) Right 2/27/2014    Performed by [facility]do [facility] at [facility] OR 2ND FLR       Family History   Problem Relation Age of Onset    Heart disease Mother     Diabetes Mother     Cancer Father     No Known Problems Sister      Cancer Brother         prostate     Nephrotic syndrome Daughter     Cancer Sister         gallbladder    Stroke Brother     Cancer Brother         liver, kidney, prostate    Congenital heart disease Brother     Cancer Daughter         breast    Anxiety disorder Daughter        Social History     Socioeconomic History    Marital status:      Spouse name: Not on file    Number of children: Not on file    Years of education: Not on file    Highest education level: Not on file   Occupational History    Not on file   Social Needs    Financial resource strain: Not on file    Food insecurity:     Worry: Not on file     Inability: Not on file    Transportation needs:     Medical: Not on file     Non-medical: Not on file   Tobacco Use    Smoking status: Former Smoker     Packs/day: 1.00     Years: 50.00     Pack years: 50.00     Types: Cigarettes     Last attempt to quit: 2014     Years since quittin.2    Smokeless tobacco: Never Used   Substance and Sexual Activity    Alcohol use: Yes     Alcohol/week: 0.0 oz     Comment: less than one monthly    Drug use: Yes     Types: Marijuana     Comment: daily    Sexual activity: Never   Lifestyle    Physical activity:     Days per week: Not on file     Minutes per session: Not on file    Stress: Not on file   Relationships    Social connections:     Talks on phone: Not on file     Gets together: Not on file     Attends Confucianist service: Not on file     Active member of club or organization: Not on file     Attends meetings of clubs or organizations: Not on file     Relationship status: Not on file   Other Topics Concern    Not on file   Social History Narrative    Previous . 3 kids, 51, 49, 48. No exercise.       Allergies:  Patient has no known allergies.    Medications:    Current Outpatient Medications:     (Magic mouthwash) 1:1:1 Benadryl 12.5mg/5ml liq, aluminum & magnesium hydroxide-simehticone (Maalox), lidocaine  viscous 2%, Swish and spit 10 mLs every 4 (four) hours as needed. for sore throat, Disp: 360 mL, Rfl: 0    amLODIPine (NORVASC) 5 MG tablet, Take 1 tablet (5 mg total) by mouth once daily., Disp: 90 tablet, Rfl: 11    ciprofloxacin HCl (CIPRO) 500 MG tablet, Take 1 tablet (500 mg total) by mouth 2 (two) times daily. for 10 days, Disp: 20 tablet, Rfl: 0    diphenhydrAMINE (BENADRYL) 25 mg capsule, Take 1 each (25 mg total) by mouth every 6 (six) hours as needed for Itching., Disp: 30 capsule, Rfl: 1    HYDROcodone-acetaminophen (NORCO) 5-325 mg per tablet, Take 1 tablet by mouth every 8 (eight) hours as needed for Pain., Disp: 15 tablet, Rfl: 0    HYDROcodone-acetaminophen (NORCO) 5-325 mg per tablet, Take 1 tablet by mouth every 6 (six) hours as needed for Pain., Disp: 30 tablet, Rfl: 0    ibuprofen (ADVIL,MOTRIN) 200 MG tablet, Take 200 mg by mouth 2 (two) times daily as needed for Pain., Disp: , Rfl:     losartan (COZAAR) 100 MG tablet, TAKE 1 TABLET BY MOUTH EVERY DAY, Disp: 90 tablet, Rfl: 11    mirabegron (MYRBETRIQ) 50 mg Tb24, Take 1 tablet (50 mg total) by mouth once daily., Disp: 30 tablet, Rfl: 2    tamsulosin (FLOMAX) 0.4 mg Cap, Take 0.4 mg by mouth once daily., Disp: , Rfl:     triamcinolone acetonide 0.1% (KENALOG) 0.1 % ointment, YEIMY TO RASH ON BODY BID PRN FOR ITCHING AND FLARES, Disp: , Rfl: 1    vitamin E 1000 UNIT capsule, Take 1,000 Units by mouth once daily., Disp: , Rfl:     albuterol 90 mcg/actuation inhaler, Inhale 2 puffs into the lungs every 6 (six) hours as needed for Wheezing., Disp: 18 g, Rfl: 11    PHYSICAL EXAMINATION:    The patient generally appears in good health, is appropriately interactive, and is in no apparent distress.     Eyes: anicteric sclerae, moist conjunctivae; no lid-lag; PERRLA     HENT: Atraumatic; oropharynx clear with moist mucous membranes and no mucosal ulcerations;normal hard and soft palate.  No evidence of lymphadenopathy.    Neck: Trachea midline.   No thyromegaly.    Musculoskeletal: No abnormal gait.    Skin: No lesions.    Mental: Cooperative with normal affect.  Is oriented to time, place, and person.    Neuro: Grossly intact.    Chest: Normal inspiratory effort.   No accessory muscles.  No audible wheezes.  Respirations symmetric on inspiration and expiration.    Heart: Regular rhythm.      Abdomen:  Soft, non-tender. No masses or organomegaly. Bladder is not palpable. No evidence of flank discomfort. No evidence of inguinal hernia.    Genitourinary: The penis is circumcised with no evidence of plaques or induration. The urethral meatus is normal. Moderate L varicocele. Moderate L epididymal tenderness. No testicular nodules appreciated. The scrotum is normal in size and contour, w/o evidence of edema or swelling.    Extremities: No clubbing, cyanosis, or edema.    LABS:    Lab Results   Component Value Date    CREATININE 1.1 04/17/2019       Lab Results   Component Value Date    PSADIAG 3.9 04/04/2019    PSADIAG 4.7 (H) 08/05/2016     Hemoglobin A1C   Date Value Ref Range Status   04/16/2014 7.1 (H) 4.5 - 6.2 % Final     IMPRESSION:    Encounter Diagnoses   Name Primary?    Scrotal pain Yes    Post-operative state     S/P TURP     Left varicocele     Left epididymitis     Dysuria      PLAN:    I spent 25 minutes with the patient of which more than half was spent in direct consultation with the patient in regards to our treatment and plan.      Discussed and reviewed epididymitis, scrotal anatomy, and possible etiologies including but not limited inflammation and/or infection. Discussed and reviewed varicocele, which can be contributing to L scrotal discomfort as well. Discussed UA dip findings in clinic today, will send urine for culture for evaluation. Recommend supportive treatment with scrotal support, appropriate rest, and use of topical ice packs. Advised to report to local ED for fevers/chills, acute worsening scrotal/testicular  pain.    Discussed and reviewed dysuria, consistent w/ recent TURP, reassured pt, as it only has been one month. Will continue to monitor closely. Pt and spouse request to f/u in one month.     Recommend supportive tx as above.     F/u 1 month.    Patient verbalized and expressed understanding, and agree w/ plan.

## 2019-05-21 NOTE — LETTER
May 21, 2019      Yael Howell MD  [address]  [facility] [address]           [facility] 4th Floor  [address]  [facility] [address]  Phone: [phone]          Patient: Herminio Burger   MR Number: [id number]   YOB: 1945   Date of Visit: 5/21/2019       Dear Dr. Yael Howell:    Thank you for referring Herminio Burger to me for evaluation. Attached you will find relevant portions of my assessment and plan of care.    If you have questions, please do not hesitate to call me. I look forward to following Herminio Burger along with you.    Sincerely,    Jefferson Osorio, DNP    Enclosure  CC:  No Recipients    If you would like to receive this communication electronically, please contact [email or web address] or [phone] to request more information on [employer] access.    For providers and/or their staff who would like to refer a patient to [facility], please contact us through our one-stop-shop provider referral line, [facility] , at [phone].    If you feel you have received this communication in error or would no longer like to receive these types of communications, please e-mail [email or web address]

## 2019-05-23 ENCOUNTER — TELEPHONE (OUTPATIENT)
Dept: UROLOGY | Facility: CLINIC | Age: 74
End: 2019-05-23

## 2019-05-23 LAB — BACTERIA UR CULT: NO GROWTH

## 2019-05-23 NOTE — TELEPHONE ENCOUNTER
----- Message from Jefferson Osorio DNP sent at 5/23/2019 12:37 PM CDT -----  Unremarkable urine culture results.

## 2019-06-21 ENCOUNTER — OFFICE VISIT (OUTPATIENT)
Dept: INTERNAL MEDICINE | Facility: CLINIC | Age: 74
End: 2019-06-21
Payer: MEDICARE

## 2019-06-21 VITALS
HEART RATE: 80 BPM | DIASTOLIC BLOOD PRESSURE: 68 MMHG | OXYGEN SATURATION: 97 % | HEIGHT: 71 IN | BODY MASS INDEX: 24.57 KG/M2 | SYSTOLIC BLOOD PRESSURE: 118 MMHG | WEIGHT: 175.5 LBS

## 2019-06-21 DIAGNOSIS — Z00.00 ENCOUNTER FOR PREVENTIVE HEALTH EXAMINATION: Primary | ICD-10-CM

## 2019-06-21 DIAGNOSIS — J44.9 CHRONIC OBSTRUCTIVE PULMONARY DISEASE, UNSPECIFIED COPD TYPE: ICD-10-CM

## 2019-06-21 DIAGNOSIS — I70.0 ATHEROSCLEROSIS OF AORTA: ICD-10-CM

## 2019-06-21 DIAGNOSIS — C34.92 MALIGNANT NEOPLASM OF LEFT LUNG, UNSPECIFIED PART OF LUNG: ICD-10-CM

## 2019-06-21 PROCEDURE — G0439 PPPS, SUBSEQ VISIT: HCPCS | Mod: HCNC,S$GLB,, | Performed by: NURSE PRACTITIONER

## 2019-06-21 PROCEDURE — G0439 PR MEDICARE ANNUAL WELLNESS SUBSEQUENT VISIT: ICD-10-PCS | Mod: HCNC,S$GLB,, | Performed by: NURSE PRACTITIONER

## 2019-06-21 PROCEDURE — 99999 PR PBB SHADOW E&M-EST. PATIENT-LVL IV: ICD-10-PCS | Mod: PBBFAC,HCNC,, | Performed by: NURSE PRACTITIONER

## 2019-06-21 PROCEDURE — 3078F PR MOST RECENT DIASTOLIC BLOOD PRESSURE < 80 MM HG: ICD-10-PCS | Mod: HCNC,CPTII,S$GLB, | Performed by: NURSE PRACTITIONER

## 2019-06-21 PROCEDURE — 3074F SYST BP LT 130 MM HG: CPT | Mod: HCNC,CPTII,S$GLB, | Performed by: NURSE PRACTITIONER

## 2019-06-21 PROCEDURE — 99499 UNLISTED E&M SERVICE: CPT | Mod: HCNC,S$GLB,, | Performed by: NURSE PRACTITIONER

## 2019-06-21 PROCEDURE — 3078F DIAST BP <80 MM HG: CPT | Mod: HCNC,CPTII,S$GLB, | Performed by: NURSE PRACTITIONER

## 2019-06-21 PROCEDURE — 3074F PR MOST RECENT SYSTOLIC BLOOD PRESSURE < 130 MM HG: ICD-10-PCS | Mod: HCNC,CPTII,S$GLB, | Performed by: NURSE PRACTITIONER

## 2019-06-21 PROCEDURE — 99499 RISK ADDL DX/OHS AUDIT: ICD-10-PCS | Mod: HCNC,S$GLB,, | Performed by: NURSE PRACTITIONER

## 2019-06-21 PROCEDURE — 99999 PR PBB SHADOW E&M-EST. PATIENT-LVL IV: CPT | Mod: PBBFAC,HCNC,, | Performed by: NURSE PRACTITIONER

## 2019-06-21 NOTE — PATIENT INSTRUCTIONS
Counseling and Referral of Other Preventative  (Italic type indicates deductible and co-insurance are waived)    Patient Name: Herminio Burger  Today's Date: 6/21/2019    Health Maintenance       Date Due Completion Date    TETANUS VACCINE 12/24/1961 ---    High Dose Statin 12/24/1964 ---    Shingles Vaccine (1 of 2) 12/24/1993 ---    Influenza Vaccine 08/01/2019 11/19/2018    Lipid Panel 03/06/2023 3/6/2018    Colonoscopy 10/06/2024 10/6/2014        No orders of the defined types were placed in this encounter.    The following information is provided to all patients.  This information is to help you find resources for any of the problems found today that may be affecting your health:                Living healthy guide: www.[facility].[place].gov      Understanding Diabetes: www.diabetes.org      Eating healthy: www.cdc.gov/healthyweight      [facility] home safety checklist: www.cdc.gov/steadi/patient.html      Agency on Aging: www.goea.[place].gov      Alcoholics anonymous (AA): www.aa.org      Physical Activity: www.[place].nih.gov/cg0osvx      Tobacco use: www.quitwithusla.org

## 2019-06-24 NOTE — PROGRESS NOTES
"Herminio Burger presented for a  Medicare AWV and comprehensive Health Risk Assessment today. The following components were reviewed and updated:    · Medical history  · Family History  · Social history  · Allergies and Current Medications  · Health Risk Assessment  · Health Maintenance  · Care Team     ** See Completed Assessments for Annual Wellness Visit within the encounter summary.**       The following assessments were completed:  · Living Situation  · CAGE  · Depression Screening  · Timed Get Up and Go  · Whisper Test  · Cognitive Function Screening  ·   ·   · Nutrition Screening  · ADL Screening  · PAQ Screening    Vitals:    06/21/19 1315   BP: 118/68   Pulse: 80   SpO2: 97%   Weight: 79.6 kg (175 lb 7.8 oz)   Height: 5' 11" (1.803 m)     Body mass index is 24.48 kg/m².  Physical Exam   Constitutional: He is oriented to person, place, and time. He appears well-developed and well-nourished.   HENT:   Head: Normocephalic and atraumatic.   Nose: Nose normal.   Eyes: Conjunctivae and EOM are normal.   Cardiovascular: Normal rate, regular rhythm, normal heart sounds and intact distal pulses.   Pulmonary/Chest: Effort normal and breath sounds normal.   Musculoskeletal: Normal range of motion.   Neurological: He is alert and oriented to person, place, and time.   Skin: Skin is warm and dry.   Psychiatric: He has a normal mood and affect. His behavior is normal. Judgment and thought content normal.   Nursing note and vitals reviewed.        Diagnoses and health risks identified today and associated recommendations/orders:    1. Encounter for preventive health examination  Assessment performed. Health maintenance updated. Chart review completed.    2. Atherosclerosis of aorta  Noted on imaging. Chronic. Stable with blood pressure control. Not currently on statin therapy.    3. Chronic obstructive pulmonary disease, unspecified COPD type  Chronic. Stable. Followed by PCP, Thoracic Surgery, and Pulmonology " testing.    4. Malignant neoplasm of left lung, unspecified part of lung  Chronic. Continue as directed. Followed by Radiation Oncology.    Provided Herminio with a 5-10 year written screening schedule and personal prevention plan. Recommendations were developed using the USPSTF age appropriate recommendations. Education, counseling, and referrals were provided as needed. After Visit Summary printed and given to patient which includes a list of additional screenings\tests needed.    Follow up for Annual Wellness Visit in 1 year, follow up with PCP as instructed, ;sooner if problems.    GEORGE Peterson

## 2019-06-25 ENCOUNTER — OFFICE VISIT (OUTPATIENT)
Dept: UROLOGY | Facility: CLINIC | Age: 74
End: 2019-06-25
Payer: MEDICARE

## 2019-06-25 VITALS
DIASTOLIC BLOOD PRESSURE: 81 MMHG | HEART RATE: 66 BPM | WEIGHT: 176.38 LBS | SYSTOLIC BLOOD PRESSURE: 144 MMHG | BODY MASS INDEX: 24.69 KG/M2 | HEIGHT: 71 IN

## 2019-06-25 DIAGNOSIS — N13.8 BENIGN PROSTATIC HYPERPLASIA WITH URINARY OBSTRUCTION: ICD-10-CM

## 2019-06-25 DIAGNOSIS — N40.1 BENIGN PROSTATIC HYPERPLASIA WITH URINARY OBSTRUCTION: ICD-10-CM

## 2019-06-25 DIAGNOSIS — Z90.79 S/P TURP (STATUS POST TRANSURETHRAL RESECTION OF PROSTATE): Primary | ICD-10-CM

## 2019-06-25 DIAGNOSIS — I86.1 LEFT VARICOCELE: ICD-10-CM

## 2019-06-25 DIAGNOSIS — Z98.890 POST-OPERATIVE STATE: ICD-10-CM

## 2019-06-25 PROCEDURE — 99999 PR PBB SHADOW E&M-EST. PATIENT-LVL III: ICD-10-PCS | Mod: PBBFAC,HCNC,, | Performed by: NURSE PRACTITIONER

## 2019-06-25 PROCEDURE — 99024 POSTOP FOLLOW-UP VISIT: CPT | Mod: HCNC,S$GLB,, | Performed by: NURSE PRACTITIONER

## 2019-06-25 PROCEDURE — 99999 PR PBB SHADOW E&M-EST. PATIENT-LVL III: CPT | Mod: PBBFAC,HCNC,, | Performed by: NURSE PRACTITIONER

## 2019-06-25 PROCEDURE — 99024 PR POST-OP FOLLOW-UP VISIT: ICD-10-PCS | Mod: HCNC,S$GLB,, | Performed by: NURSE PRACTITIONER

## 2019-06-25 NOTE — PATIENT INSTRUCTIONS
Varicocele  A varicocele (KJU-ca-web-seel) is a swelling in the veins above the testicles. It is similar to a varicose vein in the legs. The swelling happens when too much blood collects in the veins. It most often occurs around the left testicle. A varicocele may cause the sac of skin covering the testicles (the scrotum) to have a bluish color. It may also cause an achy or heavy feeling in the scrotum. The pain may be worse later in the day or after you have been standing for a long time. Some varicoceles can be seen all the time. Others can be seen only when you are standing. Or they may only be seen when a Valsalva maneuver is done. This means bearing down in your belly (abdomen) to increase pressure.  In most cases, a varicocele is not serious and doesn't need to be treated. But it is linked to being unable to have a child (infertility). If you and your partner are trying to have a baby, talk with your healthcare provider about your options.    No medicines are available to fix this condition. Surgery can be done to close off the enlarged veins. A varicocele is not serious. And all surgery has risks. So you and your healthcare provider may consider surgery only if:  · The pain becomes worse or you have new symptoms  · The testicle shrinks (atrophy)  · You want to try to improve your fertility  Home care  To help lessen discomfort, aching, or pain:  · Wear a jockstrap or snug underwear  · Take an over-the-counter pain reliever, such as ibuprofen  Follow-up care  Follow up with your healthcare provider, or as advised. Schedule regular exams with your provider so the varicocele can be watched. Be sure to keep all your appointments. Tell your provider if you notice any changes in your testicles or scrotum.   When to seek medical advice  Call your healthcare provider right away if any of these occur:  · Increasing pain in your scrotum  · Trouble urinating  · A lump in the testicle  · A bulge in the groin area  appears or gets bigger  Date Last Reviewed: 6/1/2016  © 3331-5354 [employer] [facility], [employer]. [address] [place] [address], [place] [address]. All rights reserved. This information is not intended as a substitute for professional medical care. Always follow your healthcare professional's instructions.        BPH (Enlarged Prostate)  The prostate is a gland at the base of the bladder. As some men get older, the prostate may get bigger in size. This problem is called benign prostatic hyperplasia (BPH). BPH puts pressure on the urethra. This is the tube that carries urine from the bladder to the penis. It may interfere with the flow of urine. It may also keep the bladder from emptying fully.    Symptoms of BPH include trouble starting urination and feeling as though the bladder isnt emptying all the way. It also includes a weak urine stream, dribbling and leaking of urine, and frequent and urgent urination (especially at night). BPH can increase the risk of urinary infections. It can also block off urine flow completely. If this occurs, a thin tube (catheter) may be passed into the bladder to help drain urine.  If symptoms are mild, no treatment may be needed right now. If symptoms are more severe, treatment is likely needed. The goal of treatment is to improve urine flow and reduce symptoms. Treatments can include medicine and procedures. Your healthcare provider will discuss treatment options with you as needed.  Home care  The following guidelines will help you care for yourself at home:  · Urinate as soon as you feel the urge. Don't try to hold your urine.  · Don't limit your fluid intake during the day. Drink 6 to 8 glasses of water or liquids a day. This prevents bacteria from building up in the bladder.  · Avoid drinking fluids after dinner to help reduce urination during the night.  · Avoid medicines that can worsen your symptoms. These include certain cold and allergy medicines and antidepressants. Diuretics used  for high blood pressure can also worsen symptoms. Talk to your doctor about the medicines you take. Other choices may work better for you.  Prostate cancer screening  BPH does not increase the risk of prostate cancer. But because prostate cancer is a common cancer in men, screening is sometimes recommended. This may help detect the cancer in its early stages when treatment is most effective. Factors that can increase the risk of prostate cancer include being -American or having a father or brother who had prostate cancer. A high-fat diet may also increase the risk of prostate cancer. Talk to your healthcare provider to see whether you should be screened for prostate cancer.  Follow-up care  Follow up with your healthcare provider, or as advised  To learn more, go to:  · National Kidney & Urologic Diseases Information Clearinghouse  kidney.niddk.nih.gov, 972.233.9985  When to seek medical advice  Call your healthcare provider right away if any of these occur:  · Fever of 100.4°F (38.0°C) or higher, or as advised  · Unable to pass urine for 8 hours  · Increasing pressure or pain in your bladder (lower abdomen)  · Blood in the urine  · Increasing low back pain, not related to injury  · Symptoms of urinary infection (increased urge to urinate, burning when passing urine, foul-smelling urine)  Date Last Reviewed: 7/1/2016 © 2000-2017 [employer]. [address] [place] [address], [place] [id number]. All rights reserved. This information is not intended as a substitute for professional medical care. Always follow your healthcare professional's instructions.        Transurethral Resection of the Prostate (TURP)     Excess prostate tissue is removed during a TURP to let urine flow freely through the urethra.   TURP is surgery to treat a benign enlargement of the prostate, or BPH (benign prostatic hyperplasia). This surgery removes prostate tissue to relieve pressure on the urethra. This helps relieve symptoms,  such as:  · Urinary obstruction  · Frequent urination  · Decreased urinary stream  TURP is the most common procedure for the treatment of BPH. But certain other procedures also help relieve BPH symptoms. Your health care provider may do one of these instead of TURP. They include TUIP (transurethral incision of the prostate), TUNA (transurethral needle ablation), or laser ablation. If you will have one of these procedures, your healthcare provider can tell you more about it. Your preparation and experience during surgery will be similar to TURP.   Preparing for surgery  Your healthcare provider will tell you how to prepare for your procedure. For instance, you may be asked to stop taking certain medicines a few days before the procedure. You may be asked not to eat or drink anything after the midnight before surgery. Be sure to follow any special instructions youre given.  During the TURP procedure  · You will be given medicine (anesthesia) to keep you from feeling pain during the procedure. It may be given into your spine (epidural). This is not meant to put you to sleep, but it will numb the area where the surgery is being done. In some cases, general anesthesia is used. This is to keep you sleeping throughout the surgery. The anesthesia provider (anesthesiologist or nurse anesthetist) will talk to you about the pain medicine that is best for you.  · The surgeon inserts a cystoscope (a thin, telescope-like device) into your urethra. This device lets him or her see the blocked part of the urethra.  · A cutting device is inserted through the cystoscope. This is used to remove the excess prostate tissue. The cut pieces of tissue collect in the bladder. These pieces are continuously washed away with fluids during the procedure.   · The tissue pieces are sent to the lab to be sure they are free of cancer.   Possible risks and complications of prostate procedures  · Bleeding  · Infection  · Scarring of the  urethra  · Retrograde ejaculation  · Erectile dysfunction (rare)  · Absorption of fluid during the procedure (TURP syndrome)  · Permanent incontinence (very rare)   Retrograde ejaculation  After some surgical treatments, semen may travel into the bladder instead of out of the penis during ejaculation. This side effect is called retrograde ejaculation. As a result, there may be little or no semen when you ejaculate, which can result in infertility. If you are planning to have children, talk to your healthcare provider before having the TURP procedure. Otherwise, this is not harmful to your bladder, and the feeling or orgasm and your erection won't change. Retrograde ejaculation can also be a side effect of certain medicines.  Date Last Reviewed: 1/1/2017 © 2000-2017 [employer]. [address] [place] [address], [place] [address]. All rights reserved. This information is not intended as a substitute for professional medical care. Always follow your healthcare professional's instructions.

## 2019-06-25 NOTE — PROGRESS NOTES
CHIEF COMPLAINT:    Mr. Burger is a 73 y.o. male presenting for f/u dysuria, and L scrotal pain.    PRESENTING ILLNESS:    Herminio Burger is a 73 y.o. male w/ h/o HTN, and BPH w/ obstruction s/p TURP on 4/16/19 w/ Dr. Staples.  Findings:   - trilobar hyperplasia  - large median lobe.    Scrotal US on 5/14/19 was unremarkable.    Pt last seen in clinic 5/21/19 for dysuria s/p TURP and L scrotal pain. Urine cx negative at that time. Completed course of cipro rx'd empirically by UC clinic (5/14/19).    Today pt returns to clinic w/ female spouse reporting feeling well. Reports L scrotal pain has resolved. Reports FOS has improved since surgery, denies dysuria. Denies GH/clots. Bladder feels empty post voids.    UA dip in clinic today revealed trace blood.    PVR in clinic today: 5mL.    REVIEW OF SYSTEMS:    Herminio Burger denies headache, blurred vision, fever, nausea, vomiting, chills, abdominal pain, bleeding per rectum, cough, SOB, recent loss of consciousness, recent mental status changes, seizures, dizziness, or upper or lower extremity weakness.    PATIENT HISTORY:    Past Medical History:   Diagnosis Date    Asthma     Bilateral carotid artery disease 6/6/2018    Cervical spine arthritis 6/6/2018    Colon necrosis     ischemia    Colon polyps     COPD (chronic obstructive pulmonary disease)     DVT (deep venous thrombosis)     right arm resolved    Elevated liver enzymes     Emphysema     Gallstones     Herniated disc     Hypertension     Ileostomy in place     Ischemic colitis     Screening for colon cancer        Past Surgical History:   Procedure Laterality Date    ABDOMINAL REEXPLORATION      ABDOMINAL SURGERY      BRONCHOSCOPY-FIBEROPTIC N/A 2/27/2014    [facility]    CHOLECYSTECTOMY      CHOLECYSTECTOMY N/A 11/11/2014    [facility]    CLOSURE-ILEOSTOMY N/A 11/11/2014    Performed by Jessee VICTORIA  [facility] Kristin [facility] 2ND [facility]LR    COLON SURGERY      ilesostomy closure    COLONOSCOPY N/A 10/6/2014    [facility]formed [facility] Jessee Foster [facility] at [facility] ENDO (4TH FLR)    COLONOSCOPY N/A 3/3/2014    [facility] (2ND FLR)    COLONOSCOPY W/ POLYPECTOMY      COLOSTOMY      CREATION, ILEOSTOMY Right 3/3/2014    [facility] Foster [facility] OR 2ND FLR    CYSTOSCOPY  4/16/2019    [facility]ples [facility] at [facility] OR 1ST FLR    EGD (ESOPHAGOGASTRODUODENOSCOPY) N/A 4/17/2014    Performed by Hero Palacio MD at [facility] ENDO (2ND FLR)    ERCP N/A 11/7/2014    [facility] by Berny Corbett [facility] at [facility] END[facility] (2ND FLR)    ESOPHAGOGASTRODUODENOSCOPY (EGD) N/A 10/28/2014    Performed by Raul [facility]ckinley [facility] at [facility] ENDO (4TH FLR)    EXPLORATORY-LAPAROTOMY N/A 3/19/2014    Performed by Bill Vee [facility] at [facility] OR 2ND FLR    EXPLORATORY-LAPAROTOMY N/A 3/3/2014    Per[facility] by Jes[facility] Foster [facility] at [facility] OR 2ND FLR    GASTROSTOMY TUBE PLACEMENT      REPAIR-HERNIA-VENTRAL N/A 11/11/2014    Performed by Adrian Morrissey [facility] at [facility] OR 2ND FLR    scalp lac       1970    SHOULDER ARTHROSCOPY      TONSILLECTOMY      TURP, WITHOUT USING LASER N/A 4/16/2019    [facility] at [facility] OR [facility]ST [facility]LR    ULTRASOUND-ENDOSCOPIC-UPPER N/A 11/7/2014    Performed [facility] Berny Corbett [facility] at [facility] END[facility] (2ND FLR)    VATS Right     VATS (VIDEO-ASSISTED THORACOSCOPIC SURGERY) Right 2/27/2014    Performed by [facility]do Lucas [facility] at [facility] OR 2ND FLR       Family History   Problem Relation Age of Onset    Heart disease Mother     Diabetes Mother     Cancer Father     No Known Problems Sister     Cancer Brother         prostate     Nephrotic syndrome Daughter     Hypertension Daughter     Kidney disease Daughter     Cancer Sister         gallbladder    Stroke Brother     Cancer Brother         liver, kidney, prostate    Congenital heart disease Brother      Cancer Daughter         breast    Anxiety disorder Daughter        Social History     Socioeconomic History    Marital status:      Spouse name: Not on file    Number of children: Not on file    Years of education: Not on file    Highest education level: Not on file   Occupational History    Not on file   Social Needs    Financial resource strain: Not on file    Food insecurity:     Worry: Not on file     Inability: Not on file    Transportation needs:     Medical: Not on file     Non-medical: Not on file   Tobacco Use    Smoking status: Former Smoker     Packs/day: 1.00     Years: 50.00     Pack years: 50.00     Types: Cigarettes     Last attempt to quit: 2014     Years since quittin.3    Smokeless tobacco: Never Used   Substance and Sexual Activity    Alcohol use: Yes     Alcohol/week: 0.0 oz     Comment: less than one monthly    Drug use: Yes     Types: Marijuana     Comment: daily    Sexual activity: Never   Lifestyle    Physical activity:     Days per week: Not on file     Minutes per session: Not on file    Stress: Not on file   Relationships    Social connections:     Talks on phone: Not on file     Gets together: Not on file     Attends Worship service: Not on file     Active member of club or organization: Not on file     Attends meetings of clubs or organizations: Not on file     Relationship status: Not on file   Other Topics Concern    Not on file   Social History Narrative    Previous . 3 kids, 51, 49, 48. No exercise.       Allergies:  Patient has no known allergies.    Medications:    Current Outpatient Medications:     albuterol 90 mcg/actuation inhaler, Inhale 2 puffs into the lungs every 6 (six) hours as needed for Wheezing., Disp: 18 g, Rfl: 11    amLODIPine (NORVASC) 5 MG tablet, Take 1 tablet (5 mg total) by mouth once daily., Disp: 90 tablet, Rfl: 11    diphenhydrAMINE (BENADRYL) 25 mg capsule, Take 1 each (25 mg total) by mouth every 6 (six)  hours as needed for Itching., Disp: 30 capsule, Rfl: 1    HYDROcodone-acetaminophen (NORCO) 5-325 mg per tablet, Take 1 tablet by mouth every 8 (eight) hours as needed for Pain., Disp: 15 tablet, Rfl: 0    ibuprofen (ADVIL,MOTRIN) 200 MG tablet, Take 200 mg by mouth 2 (two) times daily as needed for Pain., Disp: , Rfl:     losartan (COZAAR) 100 MG tablet, TAKE 1 TABLET BY MOUTH EVERY DAY, Disp: 90 tablet, Rfl: 11    triamcinolone acetonide 0.1% (KENALOG) 0.1 % ointment, YEIMY TO RASH ON BODY BID PRN FOR ITCHING AND FLARES, Disp: , Rfl: 1    vitamin E 1000 UNIT capsule, Take 1,000 Units by mouth once daily., Disp: , Rfl:     PHYSICAL EXAMINATION:    The patient generally appears in good health, is appropriately interactive, and is in no apparent distress.     Eyes: anicteric sclerae, moist conjunctivae; no lid-lag; PERRLA     HENT: Atraumatic; oropharynx clear with moist mucous membranes and no mucosal ulcerations;normal hard and soft palate.  No evidence of lymphadenopathy.    Neck: Trachea midline.  No thyromegaly.    Musculoskeletal: No abnormal gait.    Skin: No lesions.    Mental: Cooperative with normal affect.  Is oriented to time, place, and person.    Neuro: Grossly intact.    Chest: Normal inspiratory effort.   No accessory muscles.  No audible wheezes.  Respirations symmetric on inspiration and expiration.    Heart: Regular rhythm.      Abdomen:  Soft, non-tender. No masses or organomegaly. Bladder is not palpable. No evidence of flank discomfort. No evidence of inguinal hernia.    Extremities: No clubbing, cyanosis, or edema.    LABS:    Lab Results   Component Value Date    CREATININE 1.1 04/17/2019       Lab Results   Component Value Date    PSADIAG 3.9 04/04/2019    PSADIAG 4.7 (H) 08/05/2016     Hemoglobin A1C   Date Value Ref Range Status   04/16/2014 7.1 (H) 4.5 - 6.2 % Final     IMPRESSION:    Encounter Diagnoses   Name Primary?    S/P TURP (status post transurethral resection of prostate) Yes     Benign prostatic hyperplasia with urinary obstruction     Left varicocele      PLAN:    I spent 25 minutes with the patient of which more than half was spent in direct consultation with the patient in regards to our treatment and plan.      Discussed and reviewed post-op state, and expectations.    May continue w/ scrotal support for L varicocele.    F/u PRN.    Patient verbalized and expressed understanding, and agree w/ plan.

## 2019-07-17 ENCOUNTER — OFFICE VISIT (OUTPATIENT)
Dept: CARDIOTHORACIC SURGERY | Facility: CLINIC | Age: 74
End: 2019-07-17
Attending: THORACIC SURGERY (CARDIOTHORACIC VASCULAR SURGERY)
Payer: MEDICARE

## 2019-07-17 ENCOUNTER — HOSPITAL ENCOUNTER (OUTPATIENT)
Dept: RADIOLOGY | Facility: HOSPITAL | Age: 74
Discharge: HOME | End: 2019-07-17
Attending: THORACIC SURGERY (CARDIOTHORACIC VASCULAR SURGERY)
Payer: MEDICARE

## 2019-07-17 VITALS
SYSTOLIC BLOOD PRESSURE: 129 MMHG | OXYGEN SATURATION: 98 % | HEART RATE: 81 BPM | BODY MASS INDEX: 24.72 KG/M2 | DIASTOLIC BLOOD PRESSURE: 76 MMHG | WEIGHT: 176.56 LBS | HEIGHT: 71 IN

## 2019-07-17 DIAGNOSIS — R91.1 LUNG NODULE: ICD-10-CM

## 2019-07-17 DIAGNOSIS — C34.02 MALIGNANT NEOPLASM OF HILUS OF LEFT LUNG: Primary | ICD-10-CM

## 2019-07-17 DIAGNOSIS — Z87.09 HISTORY OF PNEUMOTHORAX: ICD-10-CM

## 2019-07-17 PROCEDURE — 99213 PR OFFICE/OUTPT VISIT, EST, LEVL III, 20-29 MIN: ICD-10-PCS | Mod: HCNC,S$GLB,, | Performed by: THORACIC SURGERY (CARDIOTHORACIC VASCULAR SURGERY)

## 2019-07-17 PROCEDURE — 99499 RISK ADDL DX/OHS AUDIT: ICD-10-PCS | Mod: S$GLB,,, | Performed by: THORACIC SURGERY (CARDIOTHORACIC VASCULAR SURGERY)

## 2019-07-17 PROCEDURE — 3078F PR MOST RECENT DIASTOLIC BLOOD PRESSURE < 80 MM HG: ICD-10-PCS | Mod: HCNC,CPTII,S$GLB, | Performed by: THORACIC SURGERY (CARDIOTHORACIC VASCULAR SURGERY)

## 2019-07-17 PROCEDURE — 99999 PR PBB SHADOW E&M-EST. PATIENT-LVL III: CPT | Mod: PBBFAC,HCNC,, | Performed by: THORACIC SURGERY (CARDIOTHORACIC VASCULAR SURGERY)

## 2019-07-17 PROCEDURE — 3074F PR MOST RECENT SYSTOLIC BLOOD PRESSURE < 130 MM HG: ICD-10-PCS | Mod: HCNC,CPTII,S$GLB, | Performed by: THORACIC SURGERY (CARDIOTHORACIC VASCULAR SURGERY)

## 2019-07-17 PROCEDURE — 99213 OFFICE O/P EST LOW 20 MIN: CPT | Mod: HCNC,S$GLB,, | Performed by: THORACIC SURGERY (CARDIOTHORACIC VASCULAR SURGERY)

## 2019-07-17 PROCEDURE — 71250 CT THORAX DX C-: CPT | Mod: 26,HCNC,, | Performed by: RADIOLOGY

## 2019-07-17 PROCEDURE — 3074F SYST BP LT 130 MM HG: CPT | Mod: HCNC,CPTII,S$GLB, | Performed by: THORACIC SURGERY (CARDIOTHORACIC VASCULAR SURGERY)

## 2019-07-17 PROCEDURE — 1101F PR PT FALLS ASSESS DOC 0-1 FALLS W/OUT INJ PAST YR: ICD-10-PCS | Mod: HCNC,CPTII,S$GLB, | Performed by: THORACIC SURGERY (CARDIOTHORACIC VASCULAR SURGERY)

## 2019-07-17 PROCEDURE — 3078F DIAST BP <80 MM HG: CPT | Mod: HCNC,CPTII,S$GLB, | Performed by: THORACIC SURGERY (CARDIOTHORACIC VASCULAR SURGERY)

## 2019-07-17 PROCEDURE — 99999 PR PBB SHADOW E&M-EST. PATIENT-LVL III: ICD-10-PCS | Mod: PBBFAC,HCNC,, | Performed by: THORACIC SURGERY (CARDIOTHORACIC VASCULAR SURGERY)

## 2019-07-17 PROCEDURE — 1101F PT FALLS ASSESS-DOCD LE1/YR: CPT | Mod: HCNC,CPTII,S$GLB, | Performed by: THORACIC SURGERY (CARDIOTHORACIC VASCULAR SURGERY)

## 2019-07-17 PROCEDURE — 71250 CT CHEST WITHOUT CONTRAST: ICD-10-PCS | Mod: 26,HCNC,, | Performed by: RADIOLOGY

## 2019-07-17 PROCEDURE — 71250 CT THORAX DX C-: CPT | Mod: TC,HCNC

## 2019-07-17 PROCEDURE — 99499 UNLISTED E&M SERVICE: CPT | Mod: S$GLB,,, | Performed by: THORACIC SURGERY (CARDIOTHORACIC VASCULAR SURGERY)

## 2019-07-17 NOTE — PROGRESS NOTES
Subjective:       Patient ID: Herminio Burger is a 73 y.o. male.    Chief Complaint: Follow-up    Diagnosis:  PET avid LANNY GGO with soft tissue component, presumed lung cancer    Pre-operative therapy: none    Procedure(s) and date(s):  SBRT on 8/3/18, 60Gy given in 8 fractions    Pathology: none     Post-treatment therapy: surveillance     HPI   73 y.o. male with HTN, COPD, spontaneous pneumothorax s/p blebectomy and colon perforation returns 1 year follow-up following SBRT to PET avid LANNY GGO adjacent to the aorta that was not amenable to biopsy. He completed SBRT on 8/3/18, 60Gy given in 8 fractions. Repeat PET following treatment showed resolution of PET avidity in left lung. Here today to review most recent CT. Today he denies difficulty with daily activities. Chronic dry cough which is occasional productive. Denies fever, chills, SOB, weight loss, cough, decreased appetite, N/V or changes in bowel and bladder functioning.       Patient has a complicated surgical history starting in February 2014 when he presented to ED with spontaneous pneumothorax. Underwent a right VATS on 2/27/14 for apical segmentectomy/blebectomy. Hospital course complicated by spontaneous colon perforation requiring lapaorotomy for ileostomy, wound vac placement, and redo laparotomy for closure of ileostomy, cholecystectomy and repair of ventral hernia in November 2014. Former smoker. Quit in 2014. 50 pack year history. Denies EtOH use.      Review of Systems   Constitutional: Negative for activity change, appetite change, fatigue, fever and unexpected weight change.   Respiratory: Positive for cough. Negative for shortness of breath.    Cardiovascular: Negative for chest pain and leg swelling.   Gastrointestinal: Negative for abdominal pain, nausea and vomiting.   Genitourinary: Negative.    Musculoskeletal: Negative.    Skin: Negative for color change and rash.   Neurological: Negative for dizziness and syncope.   Psychiatric/Behavioral:  "Negative for agitation. The patient is not nervous/anxious.          Objective:       Vitals:    07/17/19 0944   BP: 129/76   Pulse: 81   SpO2: 98%   Weight: 80.1 kg (176 lb 9.4 oz)   Height: 5' 11" (1.803 m)   PainSc: 0-No pain       Physical Exam   Constitutional: He is oriented to person, place, and time. He appears well-developed and well-nourished.   HENT:   Head: Normocephalic and atraumatic.   Eyes: EOM are normal.   Neck: Normal range of motion. Neck supple. No tracheal deviation present.   Cardiovascular: Normal rate, regular rhythm, normal heart sounds and intact distal pulses.   Pulmonary/Chest: Effort normal and breath sounds normal. He has no wheezes. He has no rales.   Abdominal: Soft.   Musculoskeletal: Normal range of motion. He exhibits no edema.   Neurological: He is alert and oriented to person, place, and time.   Skin: Skin is warm and dry.   Psychiatric: He has a normal mood and affect. Thought content normal.   Vitals reviewed.      11/19/18- PET- Left upper lobe nodule has completely resolved and there is no measurable disease on today's study and no local or distant metastases    1/16/19- Chest CT-  Postoperative changes right segmentectomy/bleb ectomy.  Previously described spiculated lesion abutting the aorta in the medial aspect of the left upper lobe is smaller on today's exam measuring 0.9 x 0.5 cm (axial series 4, image 141), previously measuring up to 1.6 cm.  Of note, this nodule received radiation recently and did not demonstrate hypermetabolic activity on PET CT 11/19/2018.  Several scattered subcentimeter pulmonary nodules throughout both lungs all appear relatively stable in size and distribution since CT 03/08/2018.  Clinical considerations will determine the chronicity for further surveillance.  Endobronchial lesion in the proximal right mainstem bronchus measuring 0.9 x 0.7 cm.  Finding is nonspecific and was present on prior CT PET 11/19/2018 but was absent on CT PET " 06/20/2018.  This may represent retained secretions but an endobronchial mass is not definitively excluded.  Direct visualization or continued surveillance with repeat CT is recommended according to clinical considerations.  Centrilobular and paraseptal emphysematous changes as well as several scattered prominent pleural bullae.    Chest CT 7/17/18:  Continued improvement in previously idenified LANNY spiculated lesion following XRT. Grossly stable of multiple subpleural opacities. Emphysema    Assessment:       73 y.o. male with HTN, COPD, spontaneous pneumothorax s/p blebectomy here today for 1 year follow up s/p SBRT to PET avid LANNY mass.   RYLEY on chest CT    Plan:       RTC in 6 months with chest CT

## 2019-09-10 DIAGNOSIS — I10 ESSENTIAL HYPERTENSION: ICD-10-CM

## 2019-09-10 RX ORDER — LOSARTAN POTASSIUM 100 MG/1
100 TABLET ORAL DAILY
Qty: 90 TABLET | Refills: 1 | Status: SHIPPED | OUTPATIENT
Start: 2019-09-10 | End: 2020-02-17

## 2019-09-10 NOTE — TELEPHONE ENCOUNTER
----- Message from Jessie Turpin sent at 9/10/2019  1:12 PM CDT -----  Contact: pt [phone]  Patient is calling for an RX refill or new RX.  Is this a refill or new RX:  New   RX name and strength: losartan (COZAAR) 100 MG tablet  Directions (copy/paste from chart):    Is this a 30 day or 90 day RX:    Local pharmacy or mail order pharmacy:  Local   Pharmacy name and phone # (copy/paste from chart): [id number] [address] [facility] [phone] (Phone)  [phone] (Fax)  Comments:  Pt out of medication

## 2019-09-18 ENCOUNTER — TELEPHONE (OUTPATIENT)
Dept: INTERNAL MEDICINE | Facility: CLINIC | Age: 74
End: 2019-09-18

## 2019-09-18 ENCOUNTER — IMMUNIZATION (OUTPATIENT)
Dept: PHARMACY | Facility: CLINIC | Age: 74
End: 2019-09-18
Payer: MEDICARE

## 2019-09-18 DIAGNOSIS — M47.812 ARTHRITIS OF NECK: ICD-10-CM

## 2019-09-18 RX ORDER — HYDROCODONE BITARTRATE AND ACETAMINOPHEN 5; 325 MG/1; MG/1
1 TABLET ORAL
Qty: 20 TABLET | Refills: 0 | Status: SHIPPED | OUTPATIENT
Start: 2019-09-18 | End: 2020-01-27

## 2019-09-18 NOTE — TELEPHONE ENCOUNTER
Patient was checked out by you, while I got the other two patients so I don't think he received the flu shot today.

## 2019-09-18 NOTE — TELEPHONE ENCOUNTER
Wife has taken his I will fill more, he takes sparingly  Orders Placed This Encounter    HYDROcodone-acetaminophen (NORCO) 5-325 mg per tablet

## 2019-11-20 ENCOUNTER — TELEPHONE (OUTPATIENT)
Dept: INTERNAL MEDICINE | Facility: CLINIC | Age: 74
End: 2019-11-20

## 2019-11-20 DIAGNOSIS — J44.1 COPD EXACERBATION: ICD-10-CM

## 2019-11-20 RX ORDER — ALBUTEROL SULFATE 90 UG/1
2 AEROSOL, METERED RESPIRATORY (INHALATION) EVERY 6 HOURS PRN
Qty: 18 G | Refills: 11 | Status: SHIPPED | OUTPATIENT
Start: 2019-11-20 | End: 2020-11-19

## 2020-01-14 ENCOUNTER — PATIENT OUTREACH (OUTPATIENT)
Dept: ADMINISTRATIVE | Facility: OTHER | Age: 75
End: 2020-01-14

## 2020-01-14 ENCOUNTER — PES CALL (OUTPATIENT)
Dept: ADMINISTRATIVE | Facility: CLINIC | Age: 75
End: 2020-01-14

## 2020-01-16 DIAGNOSIS — I10 ESSENTIAL HYPERTENSION: ICD-10-CM

## 2020-01-16 RX ORDER — AMLODIPINE BESYLATE 5 MG/1
TABLET ORAL
Qty: 30 TABLET | Refills: 1 | Status: SHIPPED | OUTPATIENT
Start: 2020-01-16 | End: 2020-01-17

## 2020-01-16 NOTE — TELEPHONE ENCOUNTER
Hi, please call patient --  I will send in one more prescription of this medicine, but I would need to see patient back for any further prescriptions.  Thank you, Ted Paula

## 2020-01-17 ENCOUNTER — OFFICE VISIT (OUTPATIENT)
Dept: CARDIOTHORACIC SURGERY | Facility: CLINIC | Age: 75
End: 2020-01-17
Attending: THORACIC SURGERY (CARDIOTHORACIC VASCULAR SURGERY)
Payer: MEDICARE

## 2020-01-17 ENCOUNTER — HOSPITAL ENCOUNTER (OUTPATIENT)
Dept: RADIOLOGY | Facility: HOSPITAL | Age: 75
Discharge: HOME | End: 2020-01-17
Attending: THORACIC SURGERY (CARDIOTHORACIC VASCULAR SURGERY)
Payer: MEDICARE

## 2020-01-17 VITALS
HEART RATE: 76 BPM | BODY MASS INDEX: 25.03 KG/M2 | WEIGHT: 178.81 LBS | DIASTOLIC BLOOD PRESSURE: 77 MMHG | SYSTOLIC BLOOD PRESSURE: 145 MMHG | OXYGEN SATURATION: 98 % | HEIGHT: 71 IN

## 2020-01-17 DIAGNOSIS — Z87.09 HISTORY OF PNEUMOTHORAX: ICD-10-CM

## 2020-01-17 DIAGNOSIS — C34.02 MALIGNANT NEOPLASM OF HILUS OF LEFT LUNG: Primary | ICD-10-CM

## 2020-01-17 PROCEDURE — 1101F PT FALLS ASSESS-DOCD LE1/YR: CPT | Mod: HCNC,CPTII,S$GLB, | Performed by: THORACIC SURGERY (CARDIOTHORACIC VASCULAR SURGERY)

## 2020-01-17 PROCEDURE — 3078F DIAST BP <80 MM HG: CPT | Mod: HCNC,CPTII,S$GLB, | Performed by: THORACIC SURGERY (CARDIOTHORACIC VASCULAR SURGERY)

## 2020-01-17 PROCEDURE — 99499 RISK ADDL DX/OHS AUDIT: ICD-10-PCS | Mod: HCNC,S$GLB,, | Performed by: THORACIC SURGERY (CARDIOTHORACIC VASCULAR SURGERY)

## 2020-01-17 PROCEDURE — 3077F SYST BP >= 140 MM HG: CPT | Mod: HCNC,CPTII,S$GLB, | Performed by: THORACIC SURGERY (CARDIOTHORACIC VASCULAR SURGERY)

## 2020-01-17 PROCEDURE — 99213 OFFICE O/P EST LOW 20 MIN: CPT | Mod: HCNC,S$GLB,, | Performed by: THORACIC SURGERY (CARDIOTHORACIC VASCULAR SURGERY)

## 2020-01-17 PROCEDURE — 99999 PR PBB SHADOW E&M-EST. PATIENT-LVL III: CPT | Mod: PBBFAC,HCNC,, | Performed by: THORACIC SURGERY (CARDIOTHORACIC VASCULAR SURGERY)

## 2020-01-17 PROCEDURE — 99213 PR OFFICE/OUTPT VISIT, EST, LEVL III, 20-29 MIN: ICD-10-PCS | Mod: HCNC,S$GLB,, | Performed by: THORACIC SURGERY (CARDIOTHORACIC VASCULAR SURGERY)

## 2020-01-17 PROCEDURE — 3078F PR MOST RECENT DIASTOLIC BLOOD PRESSURE < 80 MM HG: ICD-10-PCS | Mod: HCNC,CPTII,S$GLB, | Performed by: THORACIC SURGERY (CARDIOTHORACIC VASCULAR SURGERY)

## 2020-01-17 PROCEDURE — 3077F PR MOST RECENT SYSTOLIC BLOOD PRESSURE >= 140 MM HG: ICD-10-PCS | Mod: HCNC,CPTII,S$GLB, | Performed by: THORACIC SURGERY (CARDIOTHORACIC VASCULAR SURGERY)

## 2020-01-17 PROCEDURE — 1159F PR MEDICATION LIST DOCUMENTED IN MEDICAL RECORD: ICD-10-PCS | Mod: HCNC,S$GLB,, | Performed by: THORACIC SURGERY (CARDIOTHORACIC VASCULAR SURGERY)

## 2020-01-17 PROCEDURE — 99999 PR PBB SHADOW E&M-EST. PATIENT-LVL III: ICD-10-PCS | Mod: PBBFAC,HCNC,, | Performed by: THORACIC SURGERY (CARDIOTHORACIC VASCULAR SURGERY)

## 2020-01-17 PROCEDURE — 1101F PR PT FALLS ASSESS DOC 0-1 FALLS W/OUT INJ PAST YR: ICD-10-PCS | Mod: HCNC,CPTII,S$GLB, | Performed by: THORACIC SURGERY (CARDIOTHORACIC VASCULAR SURGERY)

## 2020-01-17 PROCEDURE — 1126F PR PAIN SEVERITY QUANTIFIED, NO PAIN PRESENT: ICD-10-PCS | Mod: HCNC,S$GLB,, | Performed by: THORACIC SURGERY (CARDIOTHORACIC VASCULAR SURGERY)

## 2020-01-17 PROCEDURE — 71250 CT THORAX DX C-: CPT | Mod: 26,HCNC,, | Performed by: RADIOLOGY

## 2020-01-17 PROCEDURE — 1159F MED LIST DOCD IN RCRD: CPT | Mod: HCNC,S$GLB,, | Performed by: THORACIC SURGERY (CARDIOTHORACIC VASCULAR SURGERY)

## 2020-01-17 PROCEDURE — 71250 CT THORAX DX C-: CPT | Mod: TC,HCNC

## 2020-01-17 PROCEDURE — 1126F AMNT PAIN NOTED NONE PRSNT: CPT | Mod: HCNC,S$GLB,, | Performed by: THORACIC SURGERY (CARDIOTHORACIC VASCULAR SURGERY)

## 2020-01-17 PROCEDURE — 99499 UNLISTED E&M SERVICE: CPT | Mod: HCNC,S$GLB,, | Performed by: THORACIC SURGERY (CARDIOTHORACIC VASCULAR SURGERY)

## 2020-01-17 PROCEDURE — 71250 CT CHEST WITHOUT CONTRAST: ICD-10-PCS | Mod: 26,HCNC,, | Performed by: RADIOLOGY

## 2020-01-17 RX ORDER — AMLODIPINE BESYLATE 5 MG/1
TABLET ORAL
Qty: 90 TABLET | Refills: 0 | Status: SHIPPED | OUTPATIENT
Start: 2020-01-17 | End: 2020-04-15

## 2020-01-17 NOTE — PROGRESS NOTES
Subjective:       Patient ID: Herminio Burger is a 74 y.o. male.    Chief Complaint: Follow-up    Diagnosis:  PET avid LANNY GGO with soft tissue component, presumed lung cancer    Pre-operative therapy: None    Procedure(s) and date(s):  SBRT on 8/3/18, 60Gy given in 8 fractions    Pathology: None     Post-treatment therapy: Surveillance     74 y.o. male with HTN, COPD, spontaneous pneumothorax s/p blebectomy and colon perforation returns 1.5 year follow-up following SBRT to PET avid LANNY GGO adjacent to the aorta that was not amenable to biopsy. He completed SBRT on 8/3/18, 60Gy given in 8 fractions. Repeat PET following treatment showed resolution of PET avidity in left lung. Here today to review most recent CT. Patient has a complicated surgical history starting in February 2014 when he presented to ED with spontaneous pneumothorax. Underwent a right VATS on 2/27/14 for apical segmentectomy/blebectomy. Hospital course complicated by spontaneous colon perforation requiring lapaorotomy for ileostomy, wound vac placement, and redo laparotomy for closure of ileostomy, cholecystectomy and repair of ventral hernia in November 2014. Former smoker. Quit in 2014. 50 pack year history. Denies EtOH use. Today he denies difficulty with daily activities. Chronic dry cough which is occasional productive. Denies fever, chills, SOB, weight loss, cough, decreased appetite, N/V or changes in bowel and bladder functioning.     Review of Systems   Constitutional: Negative for activity change, appetite change, fatigue, fever and unexpected weight change.   Respiratory: Positive for cough. Negative for shortness of breath.    Cardiovascular: Negative for chest pain and leg swelling.   Gastrointestinal: Negative for abdominal pain, nausea and vomiting.   Genitourinary: Negative.    Musculoskeletal: Negative.    Skin: Negative for color change and rash.   Neurological: Negative for dizziness and syncope.   Psychiatric/Behavioral: Negative  "for agitation. The patient is not nervous/anxious.          Objective:       Vitals:    01/17/20 0832   BP: (!) 145/77   Pulse: 76   SpO2: 98%   Weight: 81.1 kg (178 lb 12.7 oz)   Height: 5' 11" (1.803 m)   PainSc: 0-No pain       Physical Exam   Constitutional: He is oriented to person, place, and time. He appears well-developed and well-nourished.   HENT:   Head: Normocephalic and atraumatic.   Eyes: EOM are normal.   Neck: Normal range of motion. Neck supple. No tracheal deviation present.   Cardiovascular: Normal rate, regular rhythm, normal heart sounds and intact distal pulses.   Pulmonary/Chest: Effort normal and breath sounds normal. He has no wheezes. He has no rales.   Abdominal: Soft.   Musculoskeletal: Normal range of motion. He exhibits no edema.   Neurological: He is alert and oriented to person, place, and time.   Skin: Skin is warm and dry.   Psychiatric: He has a normal mood and affect. Thought content normal.   Vitals reviewed.      1/16/19- Chest CT-  Postoperative changes right segmentectomy/bleb ectomy.  Previously described spiculated lesion abutting the aorta in the medial aspect of the left upper lobe is smaller on today's exam measuring 0.9 x 0.5 cm (axial series 4, image 141), previously measuring up to 1.6 cm.  Of note, this nodule received radiation recently and did not demonstrate hypermetabolic activity on PET CT 11/19/2018.  Several scattered subcentimeter pulmonary nodules throughout both lungs all appear relatively stable in size and distribution since CT 03/08/2018.  Clinical considerations will determine the chronicity for further surveillance.  Endobronchial lesion in the proximal right mainstem bronchus measuring 0.9 x 0.7 cm.  Finding is nonspecific and was present on prior CT PET 11/19/2018 but was absent on CT PET 06/20/2018.  This may represent retained secretions but an endobronchial mass is not definitively excluded.  Direct visualization or continued surveillance with " repeat CT is recommended according to clinical considerations.  Centrilobular and paraseptal emphysematous changes as well as several scattered prominent pleural bullae.    Chest CT 7/17/19:  Continued improvement in previously idenified LANNY spiculated lesion following XRT. Grossly stable of multiple subpleural opacities. Emphysema    Chest CT 1/17/20: Reviewed. No evidence of recurrent disease. Bullous emphysema stable.     Assessment:       74 y.o. male with HTN, COPD, spontaneous pneumothorax s/p blebectomy here today for 1.5 year follow up s/p SBRT to PET avid LANNY mass.     Plan:       Doing well. RTC in 6 months with repeat noncontrast chest CT

## 2020-01-27 ENCOUNTER — LAB VISIT (OUTPATIENT)
Dept: LAB | Facility: HOSPITAL | Age: 75
End: 2020-01-27
Attending: INTERNAL MEDICINE
Payer: MEDICARE

## 2020-01-27 ENCOUNTER — OFFICE VISIT (OUTPATIENT)
Dept: INTERNAL MEDICINE | Facility: CLINIC | Age: 75
End: 2020-01-27
Payer: MEDICARE

## 2020-01-27 VITALS
DIASTOLIC BLOOD PRESSURE: 70 MMHG | HEART RATE: 91 BPM | HEIGHT: 71 IN | WEIGHT: 180.75 LBS | OXYGEN SATURATION: 96 % | SYSTOLIC BLOOD PRESSURE: 130 MMHG | BODY MASS INDEX: 25.31 KG/M2

## 2020-01-27 DIAGNOSIS — I10 ESSENTIAL HYPERTENSION: ICD-10-CM

## 2020-01-27 DIAGNOSIS — M47.812 ARTHRITIS OF NECK: ICD-10-CM

## 2020-01-27 DIAGNOSIS — C34.92 MALIGNANT NEOPLASM OF LEFT LUNG, UNSPECIFIED PART OF LUNG: ICD-10-CM

## 2020-01-27 DIAGNOSIS — J44.9 CHRONIC OBSTRUCTIVE PULMONARY DISEASE, UNSPECIFIED COPD TYPE: ICD-10-CM

## 2020-01-27 DIAGNOSIS — I70.0 ATHEROSCLEROSIS OF AORTA: ICD-10-CM

## 2020-01-27 DIAGNOSIS — J44.9 CHRONIC OBSTRUCTIVE PULMONARY DISEASE, UNSPECIFIED COPD TYPE: Primary | ICD-10-CM

## 2020-01-27 LAB
ALBUMIN SERPL BCP-MCNC: 4.2 G/DL (ref 3.5–5.2)
ALP SERPL-CCNC: 68 U/L (ref 55–135)
ALT SERPL W/O P-5'-P-CCNC: 12 U/L (ref 10–44)
ANION GAP SERPL CALC-SCNC: 8 MMOL/L (ref 8–16)
AST SERPL-CCNC: 21 U/L (ref 10–40)
BASOPHILS # BLD AUTO: 0.02 K/UL (ref 0–0.2)
BASOPHILS NFR BLD: 0.9 % (ref 0–1.9)
BILIRUB SERPL-MCNC: 0.6 MG/DL (ref 0.1–1)
BUN SERPL-MCNC: 12 MG/DL (ref 8–23)
CALCIUM SERPL-MCNC: 8.8 MG/DL (ref 8.7–10.5)
CHLORIDE SERPL-SCNC: 105 MMOL/L (ref 95–110)
CO2 SERPL-SCNC: 27 MMOL/L (ref 23–29)
CREAT SERPL-MCNC: 1.2 MG/DL (ref 0.5–1.4)
DIFFERENTIAL METHOD: ABNORMAL
EOSINOPHIL # BLD AUTO: 0.1 K/UL (ref 0–0.5)
EOSINOPHIL NFR BLD: 3 % (ref 0–8)
ERYTHROCYTE [DISTWIDTH] IN BLOOD BY AUTOMATED COUNT: 14.2 % (ref 11.5–14.5)
EST. GFR  (AFRICAN AMERICAN): >60 ML/MIN/1.73 M^2
EST. GFR  (NON AFRICAN AMERICAN): 58 ML/MIN/1.73 M^2
GIANT PLATELETS BLD QL SMEAR: PRESENT
GLUCOSE SERPL-MCNC: 81 MG/DL (ref 70–110)
HCT VFR BLD AUTO: 47.2 % (ref 40–54)
HGB BLD-MCNC: 15 G/DL (ref 14–18)
HYPOCHROMIA BLD QL SMEAR: ABNORMAL
LYMPHOCYTES # BLD AUTO: 0.9 K/UL (ref 1–4.8)
LYMPHOCYTES NFR BLD: 39.6 % (ref 18–48)
MCH RBC QN AUTO: 30.1 PG (ref 27–31)
MCHC RBC AUTO-ENTMCNC: 31.8 G/DL (ref 32–36)
MCV RBC AUTO: 95 FL (ref 82–98)
MONOCYTES # BLD AUTO: 0.4 K/UL (ref 0.3–1)
MONOCYTES NFR BLD: 17.4 % (ref 4–15)
NEUTROPHILS # BLD AUTO: 0.9 K/UL (ref 1.8–7.7)
NEUTROPHILS NFR BLD: 39.1 % (ref 38–73)
NRBC BLD-RTO: 0 /100 WBC
PLATELET # BLD AUTO: 180 K/UL (ref 150–350)
PLATELET BLD QL SMEAR: ABNORMAL
PMV BLD AUTO: 10.6 FL (ref 9.2–12.9)
POTASSIUM SERPL-SCNC: 4 MMOL/L (ref 3.5–5.1)
PROT SERPL-MCNC: 8.1 G/DL (ref 6–8.4)
RBC # BLD AUTO: 4.98 M/UL (ref 4.6–6.2)
SODIUM SERPL-SCNC: 140 MMOL/L (ref 136–145)
WBC # BLD AUTO: 2.3 K/UL (ref 3.9–12.7)

## 2020-01-27 PROCEDURE — 99499 RISK ADDL DX/OHS AUDIT: ICD-10-PCS | Mod: HCNC,S$GLB,, | Performed by: INTERNAL MEDICINE

## 2020-01-27 PROCEDURE — 99999 PR PBB SHADOW E&M-EST. PATIENT-LVL III: CPT | Mod: PBBFAC,HCNC,, | Performed by: INTERNAL MEDICINE

## 2020-01-27 PROCEDURE — 3075F PR MOST RECENT SYSTOLIC BLOOD PRESS GE 130-139MM HG: ICD-10-PCS | Mod: HCNC,CPTII,S$GLB, | Performed by: INTERNAL MEDICINE

## 2020-01-27 PROCEDURE — 85025 COMPLETE CBC W/AUTO DIFF WBC: CPT | Mod: HCNC

## 2020-01-27 PROCEDURE — 99214 PR OFFICE/OUTPT VISIT, EST, LEVL IV, 30-39 MIN: ICD-10-PCS | Mod: HCNC,S$GLB,, | Performed by: INTERNAL MEDICINE

## 2020-01-27 PROCEDURE — 1125F AMNT PAIN NOTED PAIN PRSNT: CPT | Mod: HCNC,S$GLB,, | Performed by: INTERNAL MEDICINE

## 2020-01-27 PROCEDURE — 99499 UNLISTED E&M SERVICE: CPT | Mod: HCNC,S$GLB,, | Performed by: INTERNAL MEDICINE

## 2020-01-27 PROCEDURE — 3078F DIAST BP <80 MM HG: CPT | Mod: HCNC,CPTII,S$GLB, | Performed by: INTERNAL MEDICINE

## 2020-01-27 PROCEDURE — 3078F PR MOST RECENT DIASTOLIC BLOOD PRESSURE < 80 MM HG: ICD-10-PCS | Mod: HCNC,CPTII,S$GLB, | Performed by: INTERNAL MEDICINE

## 2020-01-27 PROCEDURE — 1159F PR MEDICATION LIST DOCUMENTED IN MEDICAL RECORD: ICD-10-PCS | Mod: HCNC,S$GLB,, | Performed by: INTERNAL MEDICINE

## 2020-01-27 PROCEDURE — 3075F SYST BP GE 130 - 139MM HG: CPT | Mod: HCNC,CPTII,S$GLB, | Performed by: INTERNAL MEDICINE

## 2020-01-27 PROCEDURE — 1125F PR PAIN SEVERITY QUANTIFIED, PAIN PRESENT: ICD-10-PCS | Mod: HCNC,S$GLB,, | Performed by: INTERNAL MEDICINE

## 2020-01-27 PROCEDURE — 99214 OFFICE O/P EST MOD 30 MIN: CPT | Mod: HCNC,S$GLB,, | Performed by: INTERNAL MEDICINE

## 2020-01-27 PROCEDURE — 1101F PR PT FALLS ASSESS DOC 0-1 FALLS W/OUT INJ PAST YR: ICD-10-PCS | Mod: HCNC,CPTII,S$GLB, | Performed by: INTERNAL MEDICINE

## 2020-01-27 PROCEDURE — 80053 COMPREHEN METABOLIC PANEL: CPT | Mod: HCNC

## 2020-01-27 PROCEDURE — 1101F PT FALLS ASSESS-DOCD LE1/YR: CPT | Mod: HCNC,CPTII,S$GLB, | Performed by: INTERNAL MEDICINE

## 2020-01-27 PROCEDURE — 1159F MED LIST DOCD IN RCRD: CPT | Mod: HCNC,S$GLB,, | Performed by: INTERNAL MEDICINE

## 2020-01-27 PROCEDURE — 36415 COLL VENOUS BLD VENIPUNCTURE: CPT | Mod: HCNC

## 2020-01-27 PROCEDURE — 99999 PR PBB SHADOW E&M-EST. PATIENT-LVL III: ICD-10-PCS | Mod: PBBFAC,HCNC,, | Performed by: INTERNAL MEDICINE

## 2020-01-27 RX ORDER — CYCLOBENZAPRINE HCL 10 MG
10 TABLET ORAL 3 TIMES DAILY PRN
Qty: 30 TABLET | Refills: 1 | Status: SHIPPED | OUTPATIENT
Start: 2020-01-27 | End: 2020-08-06 | Stop reason: SDUPTHER

## 2020-01-27 RX ORDER — HYDROCODONE BITARTRATE AND ACETAMINOPHEN 5; 325 MG/1; MG/1
1 TABLET ORAL
Qty: 30 TABLET | Refills: 0 | Status: SHIPPED | OUTPATIENT
Start: 2020-01-27 | End: 2020-10-14 | Stop reason: SDUPTHER

## 2020-01-27 NOTE — PATIENT INSTRUCTIONS
I am sorry about the confusion on the bill. I am not sure what the issue is. I recommend you call the billing office -- patient account customer service office, [phone].

## 2020-01-27 NOTE — PROGRESS NOTES
Subjective:       Patient ID: Herminio Burger is a 74 y.o. male.    Chief Complaint: Follow-up    Patient is here for followup for chronic conditions.    Chronic neck and shoulder pain. Some relief with hydrocodone. No am stiffness.    No new other symptoms.    Review of Systems   Constitutional: Negative for activity change, fever and unexpected weight change.   Respiratory: Positive for cough (chronic) and wheezing. Negative for chest tightness.    Cardiovascular: Negative for chest pain, palpitations and leg swelling.   Gastrointestinal: Negative for abdominal distention, abdominal pain, nausea and vomiting.   Genitourinary: Negative for decreased urine volume, difficulty urinating and scrotal swelling.   Musculoskeletal: Positive for arthralgias. Negative for joint swelling.   Skin: Negative for rash and wound.   Neurological: Negative for tremors, syncope and weakness.   Psychiatric/Behavioral: Negative for confusion.       Objective:      Physical Exam   Constitutional: He is oriented to person, place, and time. He appears well-developed and well-nourished. No distress.   HENT:   Head: Normocephalic and atraumatic.   Mouth/Throat: No oropharyngeal exudate.   Eyes: Pupils are equal, round, and reactive to light. EOM are normal. No scleral icterus.   Neck: Normal range of motion. Neck supple. No thyromegaly present.   Cardiovascular: Normal rate, regular rhythm and normal heart sounds. Exam reveals no gallop and no friction rub.   No murmur heard.  Pulmonary/Chest: Effort normal. No respiratory distress. He has wheezes. He has no rales.   Moderate wheeze   Abdominal: Soft. Bowel sounds are normal. He exhibits no distension and no mass. There is no tenderness. There is no rebound and no guarding.   Multiple abd scars     Musculoskeletal: Normal range of motion. He exhibits no edema.   Normal bilat shoulder rom w/o any cuff weakness.  Neck decreased and painful rom   Lymphadenopathy:     He has no cervical  adenopathy.   Neurological: He is alert and oriented to person, place, and time.   Skin: No lesion noted. He is not diaphoretic.   Psychiatric: He has a normal mood and affect. His behavior is normal. Thought content normal.       Assessment:       1. Chronic obstructive pulmonary disease, unspecified COPD type    2. Arthritis of neck    3. Malignant neoplasm of left lung, unspecified part of lung    4. Essential hypertension        Plan:       Herminio was seen today for follow-up.    Diagnoses and all orders for this visit:    Chronic obstructive pulmonary disease, unspecified COPD type  -     CBC auto differential; Future  -     Comprehensive metabolic panel; Future  Remains tobacco free    Arthritis of neck  -     HYDROcodone-acetaminophen (NORCO) 5-325 mg per tablet; Take 1 tablet by mouth every 24 hours as needed for Pain.  -     cyclobenzaprine (FLEXERIL) 10 MG tablet; Take 1 tablet (10 mg total) by mouth 3 (three) times daily as needed for Muscle spasms.  Also discussed HEP/rom    Malignant neoplasm of left lung, unspecified part of lung  Stable mass    Essential hypertension  Controlled    Aortic athero -- he has declined statin in past      Health Maintenance       Date Due Completion Date    TETANUS VACCINE 12/24/1961 ---    Shingles Vaccine (1 of 2) 12/24/1993 ---    Lipid Panel 03/06/2023 3/6/2018      Declines shingrix    Follow up in about 6 months (around 7/27/2020).    Future Appointments   Date Time Provider Department Center   2/27/2020  1:00 PM Tawnya Bhandari NP [facility]   7/27/2020  1:00 PM Ted Paula MD [facility]

## 2020-01-28 ENCOUNTER — TELEPHONE (OUTPATIENT)
Dept: INTERNAL MEDICINE | Facility: CLINIC | Age: 75
End: 2020-01-28

## 2020-01-28 NOTE — TELEPHONE ENCOUNTER
Approved today  PA Case: [id number], Status: Approved, Coverage Starts on: 1/28/2020 12:00:00 AM, Coverage Ends on: 12/31/2020 12:00:00 AM. Questions? Contact [phone].

## 2020-01-28 NOTE — TELEPHONE ENCOUNTER
"----- Message from Reema Hightower sent at 1/28/2020  9:02 AM CST -----  Prior Authorization Needed    Rx: cyclobenzaprine (FLEXERIL) 10 MG tablet    To submit the PA:    1: Go to " [email or web address] " and click "Enter a Key"    2. Enter the patient's last name and date of birth and the key.      KEY: [id number]    3. Complete the forms and click "send to Plan"    Note chart when prior authorization has been submitted.    Please notify pharmacy when prior authorization has been approved.    Thank You    "

## 2020-02-15 DIAGNOSIS — I10 ESSENTIAL HYPERTENSION: ICD-10-CM

## 2020-02-17 RX ORDER — LOSARTAN POTASSIUM 100 MG/1
TABLET ORAL
Qty: 90 TABLET | Refills: 1 | Status: SHIPPED | OUTPATIENT
Start: 2020-02-17 | End: 2020-08-06

## 2020-02-27 ENCOUNTER — OFFICE VISIT (OUTPATIENT)
Dept: INTERNAL MEDICINE | Facility: CLINIC | Age: 75
End: 2020-02-27
Payer: MEDICARE

## 2020-02-27 VITALS
DIASTOLIC BLOOD PRESSURE: 88 MMHG | SYSTOLIC BLOOD PRESSURE: 138 MMHG | WEIGHT: 181.69 LBS | HEIGHT: 71 IN | HEART RATE: 86 BPM | BODY MASS INDEX: 25.44 KG/M2

## 2020-02-27 DIAGNOSIS — I70.0 ATHEROSCLEROSIS OF AORTA: ICD-10-CM

## 2020-02-27 DIAGNOSIS — R91.1 LUNG NODULE: ICD-10-CM

## 2020-02-27 DIAGNOSIS — I10 ESSENTIAL HYPERTENSION: ICD-10-CM

## 2020-02-27 DIAGNOSIS — Z00.00 ENCOUNTER FOR PREVENTIVE HEALTH EXAMINATION: Primary | ICD-10-CM

## 2020-02-27 DIAGNOSIS — Z74.09 OTHER REDUCED MOBILITY: ICD-10-CM

## 2020-02-27 DIAGNOSIS — J44.9 CHRONIC OBSTRUCTIVE PULMONARY DISEASE, UNSPECIFIED COPD TYPE: ICD-10-CM

## 2020-02-27 DIAGNOSIS — Z90.79 S/P TURP (STATUS POST TRANSURETHRAL RESECTION OF PROSTATE): ICD-10-CM

## 2020-02-27 DIAGNOSIS — C34.92 MALIGNANT NEOPLASM OF LEFT LUNG, UNSPECIFIED PART OF LUNG: ICD-10-CM

## 2020-02-27 DIAGNOSIS — Z87.09 HISTORY OF PNEUMOTHORAX: ICD-10-CM

## 2020-02-27 DIAGNOSIS — M47.812 CERVICAL SPINE ARTHRITIS: ICD-10-CM

## 2020-02-27 DIAGNOSIS — N40.0 BENIGN PROSTATIC HYPERPLASIA, UNSPECIFIED WHETHER LOWER URINARY TRACT SYMPTOMS PRESENT: ICD-10-CM

## 2020-02-27 PROCEDURE — G0439 PPPS, SUBSEQ VISIT: HCPCS | Mod: HCNC,S$GLB,, | Performed by: NURSE PRACTITIONER

## 2020-02-27 PROCEDURE — G0439 PR MEDICARE ANNUAL WELLNESS SUBSEQUENT VISIT: ICD-10-PCS | Mod: HCNC,S$GLB,, | Performed by: NURSE PRACTITIONER

## 2020-02-27 PROCEDURE — 3075F SYST BP GE 130 - 139MM HG: CPT | Mod: HCNC,CPTII,S$GLB, | Performed by: NURSE PRACTITIONER

## 2020-02-27 PROCEDURE — 99999 PR PBB SHADOW E&M-EST. PATIENT-LVL IV: ICD-10-PCS | Mod: PBBFAC,HCNC,, | Performed by: NURSE PRACTITIONER

## 2020-02-27 PROCEDURE — 3075F PR MOST RECENT SYSTOLIC BLOOD PRESS GE 130-139MM HG: ICD-10-PCS | Mod: HCNC,CPTII,S$GLB, | Performed by: NURSE PRACTITIONER

## 2020-02-27 PROCEDURE — 3079F PR MOST RECENT DIASTOLIC BLOOD PRESSURE 80-89 MM HG: ICD-10-PCS | Mod: HCNC,CPTII,S$GLB, | Performed by: NURSE PRACTITIONER

## 2020-02-27 PROCEDURE — 3079F DIAST BP 80-89 MM HG: CPT | Mod: HCNC,CPTII,S$GLB, | Performed by: NURSE PRACTITIONER

## 2020-02-27 PROCEDURE — 99499 RISK ADDL DX/OHS AUDIT: ICD-10-PCS | Mod: HCNC,S$GLB,, | Performed by: NURSE PRACTITIONER

## 2020-02-27 PROCEDURE — 99999 PR PBB SHADOW E&M-EST. PATIENT-LVL IV: CPT | Mod: PBBFAC,HCNC,, | Performed by: NURSE PRACTITIONER

## 2020-02-27 PROCEDURE — 99499 UNLISTED E&M SERVICE: CPT | Mod: HCNC,S$GLB,, | Performed by: NURSE PRACTITIONER

## 2020-02-27 NOTE — PROGRESS NOTES
"Herminio Burger presented for a  Medicare AWV and comprehensive Health Risk Assessment today. The following components were reviewed and updated:    · Medical history  · Family History  · Social history  · Allergies and Current Medications  · Health Risk Assessment  · Health Maintenance  · Care Team     ** See Completed Assessments for Annual Wellness Visit within the encounter summary.**       The following assessments were completed:  · Living Situation  · CAGE  · Depression Screening  · Timed Get Up and Go  · Whisper Test  · Cognitive Function Screening      ·   · Nutrition Screening  · ADL Screening  · PAQ Screening    Vitals:    02/27/20 1320   BP: 138/88   BP Location: Right arm   Pulse: 86   Weight: 82.4 kg (181 lb 10.5 oz)   Height: 5' 11" (1.803 m)     Body mass index is 25.34 kg/m².  Physical Exam   Constitutional: He is oriented to person, place, and time. He appears well-developed and well-nourished.   HENT:   Head: Normocephalic.   Cardiovascular: Normal rate and regular rhythm.   Pulmonary/Chest: Effort normal and breath sounds normal.   Abdominal: Soft. Bowel sounds are normal.   Musculoskeletal: Normal range of motion. He exhibits no edema.   Neurological: He is alert and oriented to person, place, and time.   Skin: Skin is warm and dry.   Psychiatric: He has a normal mood and affect.   Nursing note and vitals reviewed.        Diagnoses and health risks identified today and associated recommendations/orders:    1. Encounter for preventive health examination  Here for Health Risk Assessment/Annual Wellness Visit.  Health maintenance reviewed and updated. Follow up in one year.  Prescription given for Shingrix. Discussed TDAP.    2. Essential hypertension  Chronic, stable on current medications. Followed by PCP.    3. Atherosclerosis of aorta  Chronic, stable on current medications. Noted CT Abdomen/pelvis 5/23/16. Followed by PCP.    4. Chronic obstructive pulmonary disease, unspecified COPD " type  Chronic, stable on current PRN medication. Followed by PCP.    5. Lung nodule  Chronic, stable. Followed by PCP, CTS.    6. Malignant neoplasm of left lung, unspecified part of lung  Stable. Last scan RYLEY. Under active surveillance. Followed by PCP, CTS, Radiation Oncology.    7. History of pneumothorax  Stable. Followed by PCP, CTS.    8. Benign prostatic hyperplasia, unspecified whether lower urinary tract symptoms present  Chronic, stable. Followed by PCP, Urology.    9. S/P TURP (status post transurethral resection of prostate)  Stable.  Followed by Urology.    10. Cervical spine arthritis  Chronic, recent episode of severe shoulder/neck pain requiring Norco and Flexeril. Reports improvement. Declined scheduling follow up appointment. Stated he was followed by outside Pain Management and will follow up with them. Followed by PCP.    11. Other reduced mobility  Stable. No assistive device for ambulation, no reported falls. Followed by PCP.        Provided Herminio with a 5-10 year written screening schedule and personal prevention plan. Recommendations were developed using the USPSTF age appropriate recommendations. Education, counseling, and referrals were provided as needed. After Visit Summary printed and given to patient which includes a list of additional screenings\tests needed.    Follow up in 5 months (on 7/27/2020).with PCP.    Tawnya Bhandari NP

## 2020-02-27 NOTE — PROGRESS NOTES
I offered to discuss end of life issues, including information on how to make advance directives that the patient could use to name someone who would make medical decisions on their behalf if they became too ill to make themselves.    _X_Patient declined -wife with make decisions and is aware of preferences  ___Patient is interested, I provided paper work and offered to discuss.

## 2020-02-27 NOTE — PATIENT INSTRUCTIONS
Counseling and Referral of Other Preventative  (Italic type indicates deductible and co-insurance are waived)    Patient Name: Herminio Burger  Today's Date: 2/27/2020    Health Maintenance       Date Due Completion Date    TETANUS VACCINE 12/24/1963 Consider obtaining    Shingles Vaccine (1 of 2) 12/24/1993 Obtain new shingles vaccine - SHINGRIX - when available      Lipid Panel 03/06/2023 3/6/2018        No orders of the defined types were placed in this encounter.    The following information is provided to all patients.  This information is to help you find resources for any of the problems found today that may be affecting your health:                Living healthy guide: www.[facility].[place].[facility]      Understanding Diabetes: www.diabetes.org      Eating healthy: www.cdc.gov/healthyweight      [facility] home safety checklist: www.cdc.gov/steadi/patient.html      Agency on Aging: www.goea.[place]      Alcoholics anonymous (AA): www.aa.org      Physical Activity: www.[place].nih.gov/bb6lrgg      Tobacco use: www.quitwithusla.org

## 2020-03-09 ENCOUNTER — TELEPHONE (OUTPATIENT)
Dept: INTERNAL MEDICINE | Facility: CLINIC | Age: 75
End: 2020-03-09

## 2020-03-09 DIAGNOSIS — J06.9 UPPER RESPIRATORY TRACT INFECTION, UNSPECIFIED TYPE: ICD-10-CM

## 2020-03-09 NOTE — TELEPHONE ENCOUNTER
----- Message from Dasia Arredondo sent at 3/9/2020  3:10 PM CDT -----  Contact: [phone]  Type: Rx    Name of medication(s): virtussin    Is this a refill? New rx? New prescription    Who prescribed medication?  Dr. Paula     Pharmacy Name, Phone, & Location:    [id number] [address] AT [facility]    Comments:   Please advise, thankyou

## 2020-03-10 RX ORDER — CODEINE PHOSPHATE AND GUAIFENESIN 10; 100 MG/5ML; MG/5ML
5 SOLUTION ORAL NIGHTLY PRN
Qty: 236 ML | Refills: 1 | Status: SHIPPED | OUTPATIENT
Start: 2020-03-10 | End: 2020-08-06 | Stop reason: SDUPTHER

## 2020-04-15 DIAGNOSIS — I10 ESSENTIAL HYPERTENSION: ICD-10-CM

## 2020-04-15 RX ORDER — AMLODIPINE BESYLATE 5 MG/1
TABLET ORAL
Qty: 90 TABLET | Refills: 2 | Status: SHIPPED | OUTPATIENT
Start: 2020-04-15

## 2020-06-29 ENCOUNTER — TELEPHONE (OUTPATIENT)
Dept: OPHTHALMOLOGY | Facility: CLINIC | Age: 75
End: 2020-06-29

## 2020-06-29 NOTE — TELEPHONE ENCOUNTER
----- Message from Dixie Cummings sent at 6/29/2020 10:36 AM CDT -----  Regarding: Pt thinks he has infection  Contact: [facility]  OPHTHALMOLOGY TRIAGE CALL    Date:  6/29/2020   Time:  10:37 AM  Person Calling: Herminio  Phone Number:  [phone] (home)   Call received by:  Dixie Cummings  Patient in Clinic now:  No  Which eye:  Both  Name of Patient's Eye : Fredy  Date Last Seen:    Disposition of patient: Pt calling to inform his eyes feel very scratchy and itchy with redness and they stick together in the morning as of today.      For How Long: weekend    Additional Comments:

## 2020-06-30 ENCOUNTER — PATIENT OUTREACH (OUTPATIENT)
Dept: ADMINISTRATIVE | Facility: OTHER | Age: 75
End: 2020-06-30

## 2020-07-01 ENCOUNTER — OFFICE VISIT (OUTPATIENT)
Dept: OPHTHALMOLOGY | Facility: CLINIC | Age: 75
End: 2020-07-01
Payer: MEDICARE

## 2020-07-01 DIAGNOSIS — H02.883 MEIBOMIAN GLAND DYSFUNCTION (MGD) OF BOTH EYES: Primary | ICD-10-CM

## 2020-07-01 DIAGNOSIS — H02.886 MEIBOMIAN GLAND DYSFUNCTION (MGD) OF BOTH EYES: Primary | ICD-10-CM

## 2020-07-01 PROCEDURE — 92012 INTRM OPH EXAM EST PATIENT: CPT | Mod: HCNC,S$GLB,, | Performed by: OPHTHALMOLOGY

## 2020-07-01 PROCEDURE — 99999 PR PBB SHADOW E&M-EST. PATIENT-LVL III: ICD-10-PCS | Mod: PBBFAC,HCNC,, | Performed by: OPHTHALMOLOGY

## 2020-07-01 PROCEDURE — 99999 PR PBB SHADOW E&M-EST. PATIENT-LVL III: CPT | Mod: PBBFAC,HCNC,, | Performed by: OPHTHALMOLOGY

## 2020-07-01 PROCEDURE — 92012 PR EYE EXAM, EST PATIENT,INTERMED: ICD-10-PCS | Mod: HCNC,S$GLB,, | Performed by: OPHTHALMOLOGY

## 2020-07-01 NOTE — PATIENT INSTRUCTIONS
Warm compresses followed by gentle eyelid compression up to four times a day. Lid cleaning every other day. Artificial tears as desired.. Return as needed.

## 2020-07-01 NOTE — PROGRESS NOTES
Requested updates within Care Everywhere.  Patient's chart was reviewed for overdue MEENU topics.  Immunizations reconciled.    Orders placed:n/a  Labs Linked:n/a

## 2020-07-01 NOTE — PROGRESS NOTES
HPI     Triage pt  Patient states OU itchy,scratchy,tearing x week.   Vision stable.    Eye drops:Systane Ultra bid OU(past week)    I have personally interviewed the patient, reviewed the history and   examined the patient and agree with the technician's exam.    Last edited by Stephen Hazel MD on 7/1/2020  1:58 PM. (History)            Assessment /Plan     For exam results, see Encounter Report.    Meibomian gland dysfunction (MGD) of both eyes      Mr. Burger's symptoms have improved over the past week.. If his symptoms recur, I recommended lid clleaning every other day, warm compresses, and artificial tears. Return to me as requested.

## 2020-07-16 ENCOUNTER — PATIENT OUTREACH (OUTPATIENT)
Dept: ADMINISTRATIVE | Facility: OTHER | Age: 75
End: 2020-07-16

## 2020-07-17 ENCOUNTER — HOSPITAL ENCOUNTER (OUTPATIENT)
Dept: RADIOLOGY | Facility: HOSPITAL | Age: 75
Discharge: HOME | End: 2020-07-17
Attending: THORACIC SURGERY (CARDIOTHORACIC VASCULAR SURGERY)
Payer: MEDICARE

## 2020-07-17 ENCOUNTER — OFFICE VISIT (OUTPATIENT)
Dept: CARDIOTHORACIC SURGERY | Facility: CLINIC | Age: 75
End: 2020-07-17
Payer: MEDICARE

## 2020-07-17 VITALS
HEIGHT: 71 IN | BODY MASS INDEX: 25.68 KG/M2 | HEART RATE: 76 BPM | DIASTOLIC BLOOD PRESSURE: 81 MMHG | WEIGHT: 183.44 LBS | OXYGEN SATURATION: 98 % | SYSTOLIC BLOOD PRESSURE: 154 MMHG

## 2020-07-17 DIAGNOSIS — C34.12 MALIGNANT NEOPLASM OF UPPER LOBE OF LEFT LUNG: Primary | ICD-10-CM

## 2020-07-17 DIAGNOSIS — C34.02 MALIGNANT NEOPLASM OF HILUS OF LEFT LUNG: ICD-10-CM

## 2020-07-17 DIAGNOSIS — Z85.118 HISTORY OF LUNG CANCER: ICD-10-CM

## 2020-07-17 PROCEDURE — 99499 UNLISTED E&M SERVICE: CPT | Mod: HCNC,S$GLB,, | Performed by: THORACIC SURGERY (CARDIOTHORACIC VASCULAR SURGERY)

## 2020-07-17 PROCEDURE — 1101F PT FALLS ASSESS-DOCD LE1/YR: CPT | Mod: HCNC,CPTII,S$GLB, | Performed by: THORACIC SURGERY (CARDIOTHORACIC VASCULAR SURGERY)

## 2020-07-17 PROCEDURE — 3079F PR MOST RECENT DIASTOLIC BLOOD PRESSURE 80-89 MM HG: ICD-10-PCS | Mod: HCNC,CPTII,S$GLB, | Performed by: THORACIC SURGERY (CARDIOTHORACIC VASCULAR SURGERY)

## 2020-07-17 PROCEDURE — 1101F PR PT FALLS ASSESS DOC 0-1 FALLS W/OUT INJ PAST YR: ICD-10-PCS | Mod: HCNC,CPTII,S$GLB, | Performed by: THORACIC SURGERY (CARDIOTHORACIC VASCULAR SURGERY)

## 2020-07-17 PROCEDURE — 3077F SYST BP >= 140 MM HG: CPT | Mod: HCNC,CPTII,S$GLB, | Performed by: THORACIC SURGERY (CARDIOTHORACIC VASCULAR SURGERY)

## 2020-07-17 PROCEDURE — 99213 PR OFFICE/OUTPT VISIT, EST, LEVL III, 20-29 MIN: ICD-10-PCS | Mod: HCNC,S$GLB,, | Performed by: THORACIC SURGERY (CARDIOTHORACIC VASCULAR SURGERY)

## 2020-07-17 PROCEDURE — 71250 CT CHEST WITHOUT CONTRAST: ICD-10-PCS | Mod: 26,HCNC,, | Performed by: RADIOLOGY

## 2020-07-17 PROCEDURE — 71250 CT THORAX DX C-: CPT | Mod: 26,HCNC,, | Performed by: RADIOLOGY

## 2020-07-17 PROCEDURE — 99999 PR PBB SHADOW E&M-EST. PATIENT-LVL IV: ICD-10-PCS | Mod: PBBFAC,HCNC,, | Performed by: THORACIC SURGERY (CARDIOTHORACIC VASCULAR SURGERY)

## 2020-07-17 PROCEDURE — 99213 OFFICE O/P EST LOW 20 MIN: CPT | Mod: HCNC,S$GLB,, | Performed by: THORACIC SURGERY (CARDIOTHORACIC VASCULAR SURGERY)

## 2020-07-17 PROCEDURE — 1126F AMNT PAIN NOTED NONE PRSNT: CPT | Mod: HCNC,S$GLB,, | Performed by: THORACIC SURGERY (CARDIOTHORACIC VASCULAR SURGERY)

## 2020-07-17 PROCEDURE — 71250 CT THORAX DX C-: CPT | Mod: TC,HCNC

## 2020-07-17 PROCEDURE — 1159F PR MEDICATION LIST DOCUMENTED IN MEDICAL RECORD: ICD-10-PCS | Mod: HCNC,S$GLB,, | Performed by: THORACIC SURGERY (CARDIOTHORACIC VASCULAR SURGERY)

## 2020-07-17 PROCEDURE — 1126F PR PAIN SEVERITY QUANTIFIED, NO PAIN PRESENT: ICD-10-PCS | Mod: HCNC,S$GLB,, | Performed by: THORACIC SURGERY (CARDIOTHORACIC VASCULAR SURGERY)

## 2020-07-17 PROCEDURE — 3079F DIAST BP 80-89 MM HG: CPT | Mod: HCNC,CPTII,S$GLB, | Performed by: THORACIC SURGERY (CARDIOTHORACIC VASCULAR SURGERY)

## 2020-07-17 PROCEDURE — 99999 PR PBB SHADOW E&M-EST. PATIENT-LVL IV: CPT | Mod: PBBFAC,HCNC,, | Performed by: THORACIC SURGERY (CARDIOTHORACIC VASCULAR SURGERY)

## 2020-07-17 PROCEDURE — 3077F PR MOST RECENT SYSTOLIC BLOOD PRESSURE >= 140 MM HG: ICD-10-PCS | Mod: HCNC,CPTII,S$GLB, | Performed by: THORACIC SURGERY (CARDIOTHORACIC VASCULAR SURGERY)

## 2020-07-17 PROCEDURE — 99499 RISK ADDL DX/OHS AUDIT: ICD-10-PCS | Mod: HCNC,S$GLB,, | Performed by: THORACIC SURGERY (CARDIOTHORACIC VASCULAR SURGERY)

## 2020-07-17 PROCEDURE — 1159F MED LIST DOCD IN RCRD: CPT | Mod: HCNC,S$GLB,, | Performed by: THORACIC SURGERY (CARDIOTHORACIC VASCULAR SURGERY)
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
## 2020-08-06 ENCOUNTER — OFFICE VISIT (OUTPATIENT)
Dept: INTERNAL MEDICINE | Facility: CLINIC | Age: 75
End: 2020-08-06
Payer: MEDICARE

## 2020-08-06 VITALS
SYSTOLIC BLOOD PRESSURE: 135 MMHG | HEIGHT: 71 IN | DIASTOLIC BLOOD PRESSURE: 80 MMHG | OXYGEN SATURATION: 98 % | HEART RATE: 98 BPM | BODY MASS INDEX: 25.52 KG/M2 | WEIGHT: 182.31 LBS

## 2020-08-06 DIAGNOSIS — I73.9 LEFT LEG CLAUDICATION: Primary | ICD-10-CM

## 2020-08-06 DIAGNOSIS — M47.812 ARTHRITIS OF NECK: ICD-10-CM

## 2020-08-06 DIAGNOSIS — J06.9 UPPER RESPIRATORY TRACT INFECTION, UNSPECIFIED TYPE: ICD-10-CM

## 2020-08-06 DIAGNOSIS — I10 ESSENTIAL HYPERTENSION: ICD-10-CM

## 2020-08-06 PROCEDURE — 3075F SYST BP GE 130 - 139MM HG: CPT | Mod: CPTII,S$GLB,, | Performed by: INTERNAL MEDICINE

## 2020-08-06 PROCEDURE — 1101F PT FALLS ASSESS-DOCD LE1/YR: CPT | Mod: CPTII,S$GLB,, | Performed by: INTERNAL MEDICINE

## 2020-08-06 PROCEDURE — 1159F PR MEDICATION LIST DOCUMENTED IN MEDICAL RECORD: ICD-10-PCS | Mod: S$GLB,,, | Performed by: INTERNAL MEDICINE

## 2020-08-06 PROCEDURE — 99999 PR PBB SHADOW E&M-EST. PATIENT-LVL IV: CPT | Mod: PBBFAC,HCNC,, | Performed by: INTERNAL MEDICINE

## 2020-08-06 PROCEDURE — 99999 PR PBB SHADOW E&M-EST. PATIENT-LVL IV: ICD-10-PCS | Mod: PBBFAC,HCNC,, | Performed by: INTERNAL MEDICINE

## 2020-08-06 PROCEDURE — 1101F PR PT FALLS ASSESS DOC 0-1 FALLS W/OUT INJ PAST YR: ICD-10-PCS | Mod: CPTII,S$GLB,, | Performed by: INTERNAL MEDICINE

## 2020-08-06 PROCEDURE — 99214 PR OFFICE/OUTPT VISIT, EST, LEVL IV, 30-39 MIN: ICD-10-PCS | Mod: S$GLB,,, | Performed by: INTERNAL MEDICINE

## 2020-08-06 PROCEDURE — 3079F DIAST BP 80-89 MM HG: CPT | Mod: CPTII,S$GLB,, | Performed by: INTERNAL MEDICINE

## 2020-08-06 PROCEDURE — 1126F AMNT PAIN NOTED NONE PRSNT: CPT | Mod: S$GLB,,, | Performed by: INTERNAL MEDICINE

## 2020-08-06 PROCEDURE — 99214 OFFICE O/P EST MOD 30 MIN: CPT | Mod: S$GLB,,, | Performed by: INTERNAL MEDICINE

## 2020-08-06 PROCEDURE — 3079F PR MOST RECENT DIASTOLIC BLOOD PRESSURE 80-89 MM HG: ICD-10-PCS | Mod: CPTII,S$GLB,, | Performed by: INTERNAL MEDICINE

## 2020-08-06 PROCEDURE — 99499 RISK ADDL DX/OHS AUDIT: ICD-10-PCS | Mod: HCNC,S$GLB,, | Performed by: INTERNAL MEDICINE

## 2020-08-06 PROCEDURE — 1159F MED LIST DOCD IN RCRD: CPT | Mod: S$GLB,,, | Performed by: INTERNAL MEDICINE

## 2020-08-06 PROCEDURE — 1126F PR PAIN SEVERITY QUANTIFIED, NO PAIN PRESENT: ICD-10-PCS | Mod: S$GLB,,, | Performed by: INTERNAL MEDICINE

## 2020-08-06 PROCEDURE — 3075F PR MOST RECENT SYSTOLIC BLOOD PRESS GE 130-139MM HG: ICD-10-PCS | Mod: CPTII,S$GLB,, | Performed by: INTERNAL MEDICINE

## 2020-08-06 PROCEDURE — 99499 UNLISTED E&M SERVICE: CPT | Mod: HCNC,S$GLB,, | Performed by: INTERNAL MEDICINE

## 2020-08-06 RX ORDER — CYCLOBENZAPRINE HCL 10 MG
10 TABLET ORAL 3 TIMES DAILY PRN
Qty: 30 TABLET | Refills: 1 | Status: SHIPPED | OUTPATIENT
Start: 2020-08-06

## 2020-08-06 RX ORDER — CODEINE PHOSPHATE AND GUAIFENESIN 10; 100 MG/5ML; MG/5ML
5 SOLUTION ORAL NIGHTLY PRN
Qty: 236 ML | Refills: 1 | Status: SHIPPED | OUTPATIENT
Start: 2020-08-06 | End: 2020-08-21

## 2020-08-06 RX ORDER — LOSARTAN POTASSIUM 100 MG/1
TABLET ORAL
Qty: 90 TABLET | Refills: 11 | Status: SHIPPED | OUTPATIENT
Start: 2020-08-06

## 2020-08-06 NOTE — PROGRESS NOTES
Subjective:       Patient ID: Herminio Burger is a 74 y.o. male.    Chief Complaint: Follow-up    Patient is here for followup for chronic conditions.    Needs flexeril, syrup RFed.    Gets LLE claudication symptoms, especially with mowing lawn. Takes a few minutes to ease.    No new other symptoms.    Uses frequ MJ.    Review of Systems   Constitutional: Negative for activity change, fever and unexpected weight change.   Respiratory: Positive for cough (chronic) and wheezing. Negative for chest tightness.    Cardiovascular: Negative for chest pain, palpitations and leg swelling.   Gastrointestinal: Negative for abdominal distention, abdominal pain, nausea and vomiting.   Genitourinary: Negative for decreased urine volume, difficulty urinating and scrotal swelling.   Musculoskeletal: Positive for arthralgias. Negative for joint swelling.   Skin: Negative for rash and wound.   Neurological: Negative for tremors, syncope and weakness.   Psychiatric/Behavioral: Negative for confusion.           Objective:      Physical Exam  Constitutional:       General: He is not in acute distress.     Appearance: He is well-developed. He is not diaphoretic.   HENT:      Head: Normocephalic and atraumatic.      Mouth/Throat:      Pharynx: No oropharyngeal exudate.   Eyes:      General: No scleral icterus.     Pupils: Pupils are equal, round, and reactive to light.   Neck:      Musculoskeletal: Normal range of motion and neck supple.      Thyroid: No thyromegaly.   Cardiovascular:      Rate and Rhythm: Normal rate and regular rhythm.      Heart sounds: Normal heart sounds. No murmur. No friction rub. No gallop.    Pulmonary:      Effort: Pulmonary effort is normal. No respiratory distress.      Breath sounds: Wheezing present. No rales.   Abdominal:      General: Bowel sounds are normal. There is no distension.      Palpations: Abdomen is soft. There is no mass.      Tenderness: There is no abdominal tenderness. There is no guarding or  rebound.      Comments: Multiple abd scars     Musculoskeletal: Normal range of motion.      Comments: Normal bilat shoulder rom w/o any cuff weakness.  Neck decreased and painful rom    L foot decreased pulses but foot is warm   Lymphadenopathy:      Cervical: No cervical adenopathy.   Skin:     Findings: No lesion.   Neurological:      Mental Status: He is alert and oriented to person, place, and time.   Psychiatric:         Behavior: Behavior normal.         Thought Content: Thought content normal.         Assessment:       1. Left leg claudication    2. Arthritis of neck    3. Upper respiratory tract infection, unspecified type    4. Essential hypertension        Plan:       Herminio was seen today for follow-up.    Diagnoses and all orders for this visit:    Left leg claudication  -     US Ankle Brachial Indices Ext LTD WO Str; Future  If PAD we discussed statin and vasc referral    Arthritis of neck  -     cyclobenzaprine (FLEXERIL) 10 MG tablet; Take 1 tablet (10 mg total) by mouth 3 (three) times daily as needed for Muscle spasms.    Upper respiratory tract infection, unspecified type  -     guaifenesin-codeine 100-10 mg/5 ml (TUSSI-ORGANIDIN NR)  mg/5 mL syrup; Take 5 mLs by mouth nightly as needed.    Essential hypertension  -     losartan (COZAAR) 100 MG tablet; TAKE 1 TABLET(100 MG) BY MOUTH EVERY DAY  Controlled    Lengthy discussion re importance of MJ cessation, especially smoking MJ due to lung cancer hx      Health Maintenance       Date Due Completion Date    Hepatitis C Screening 1945 ---    TETANUS VACCINE 12/24/1961 ---    Shingles Vaccine (1 of 2) 12/24/1993 ---    Colonoscopy 10/06/2015 10/6/2014    Influenza Vaccine (1) 09/01/2020 9/18/2019    Lipid Panel 03/06/2023 3/6/2018      Declines vaccines    Follow up in about 6 months (around 2/6/2021).    Future Appointments   Date Time Provider Department Center   8/12/2020  1:00 PM [facility]   9/4/2020  1:00 PM  Bernabe Begum OD [facility]   2/8/2021  1:40 PM Ted Paula MD [facility] W

## 2020-08-12 ENCOUNTER — TELEPHONE (OUTPATIENT)
Dept: INTERNAL MEDICINE | Facility: CLINIC | Age: 75
End: 2020-08-12

## 2020-08-12 DIAGNOSIS — I73.9 PAD (PERIPHERAL ARTERY DISEASE): Primary | ICD-10-CM

## 2020-08-12 RX ORDER — ATORVASTATIN CALCIUM 20 MG/1
20 TABLET, FILM COATED ORAL DAILY
Qty: 90 TABLET | Refills: 11 | Status: SHIPPED | OUTPATIENT
Start: 2020-08-12 | End: 2020-09-16

## 2020-08-12 NOTE — TELEPHONE ENCOUNTER
I called him abt ABIs c/w claudication L leg. Offered vascular surg c/s he declines for now since pain not severe and no rest pain  He will call if symptoms worsen  He will keep on staying active and start  There are no Patient Instructions on file for this visit.

## 2020-08-19 NOTE — PROGRESS NOTES
Subjective:       Patient ID: Herminio Burger is a 72 y.o. male.    Chief Complaint: Back Pain (upper/ lower back pain)    HPI: He complains of neck, right shoulder, and lower back pain.  Denies any acute trauma.  No numbness, no weakness      Review of Systems   Constitutional: Negative for chills, fatigue, fever and unexpected weight change.   Respiratory: Negative for chest tightness and shortness of breath.    Cardiovascular: Negative for chest pain and palpitations.   Gastrointestinal: Negative for abdominal pain and blood in stool.   Neurological: Negative for dizziness, syncope, numbness and headaches.       Objective:      Physical Exam   HENT:   Right Ear: External ear normal.   Left Ear: External ear normal.   Nose: Nose normal.   Mouth/Throat: Oropharynx is clear and moist.   Eyes: Pupils are equal, round, and reactive to light.   Neck: Normal range of motion.   Cardiovascular: Normal rate and regular rhythm.    No murmur heard.  Pulmonary/Chest: Breath sounds normal.   Abdominal: He exhibits no distension. There is no hepatosplenomegaly. There is no tenderness.   Lymphadenopathy:     He has no cervical adenopathy.     He has no axillary adenopathy.   Neurological: He has normal strength and normal reflexes. No cranial nerve deficit or sensory deficit.     cervical spine, right shoulder, lumbar spine without tenderness or redness  Assessment:     assessment and plan: Check cervical spine, lumbar spine and right shoulder x-ray.  He was here for an urgent care only appointment.  He will follow-up with his primary care physician for a physical      Plan:       As above    delivery delivered

## 2020-09-03 ENCOUNTER — PATIENT OUTREACH (OUTPATIENT)
Dept: ADMINISTRATIVE | Facility: OTHER | Age: 75
End: 2020-09-03

## 2020-09-04 ENCOUNTER — OFFICE VISIT (OUTPATIENT)
Dept: OPTOMETRY | Facility: CLINIC | Age: 75
End: 2020-09-04
Payer: MEDICARE

## 2020-09-04 DIAGNOSIS — Z01.00 EYE EXAM, ROUTINE: Primary | ICD-10-CM

## 2020-09-04 DIAGNOSIS — H52.223 REGULAR ASTIGMATISM OF BOTH EYES WITH PRESBYOPIA: ICD-10-CM

## 2020-09-04 DIAGNOSIS — H52.4 REGULAR ASTIGMATISM OF BOTH EYES WITH PRESBYOPIA: ICD-10-CM

## 2020-09-04 PROCEDURE — 92015 DETERMINE REFRACTIVE STATE: CPT | Mod: HCNC,S$GLB,, | Performed by: OPTOMETRIST

## 2020-09-04 PROCEDURE — 99999 PR PBB SHADOW E&M-EST. PATIENT-LVL III: CPT | Mod: PBBFAC,HCNC,, | Performed by: OPTOMETRIST

## 2020-09-04 PROCEDURE — 92014 PR EYE EXAM, EST PATIENT,COMPREHESV: ICD-10-PCS | Mod: HCNC,S$GLB,, | Performed by: OPTOMETRIST

## 2020-09-04 PROCEDURE — 99999 PR PBB SHADOW E&M-EST. PATIENT-LVL III: ICD-10-PCS | Mod: PBBFAC,HCNC,, | Performed by: OPTOMETRIST

## 2020-09-04 PROCEDURE — 92014 COMPRE OPH EXAM EST PT 1/>: CPT | Mod: HCNC,S$GLB,, | Performed by: OPTOMETRIST

## 2020-09-04 PROCEDURE — 92015 PR REFRACTION: ICD-10-PCS | Mod: HCNC,S$GLB,, | Performed by: OPTOMETRIST

## 2020-09-04 NOTE — PROGRESS NOTES
HPI     7/1/20 Dr Hazel, 3/18/19 Dr Daniel  [facility]   No eye surgery   Systane daily- BID OU    Pt here for check of ocular health.  Pt states occasional itching, tearing   and burning.  Pt denies blurry VA OU.  Pt denies flashes, floaters,   headaches or eye pain.      Last edited by Bernabe Begum, OD on 9/4/2020  1:28 PM. (History)            Assessment /Plan     For exam results, see Encounter Report.    Eye exam, routine  -[facility]  -Cataracts OU    Regular astigmatism of both eyes with presbyopia  Eyeglass Final Rx     Eyeglass Final Rx       Sphere Cylinder Axis Dist VA Add    Right [place] +0.50 020 20/25 +2.25    Left -0.75 +1.50 180 20/25 +2.25    Type: Bifocal    Expiration Date: 9/5/2021                  RTC 1 yr

## 2020-09-16 ENCOUNTER — IMMUNIZATION (OUTPATIENT)
Dept: INTERNAL MEDICINE | Facility: CLINIC | Age: 75
End: 2020-09-16
Payer: MEDICARE

## 2020-09-16 ENCOUNTER — OFFICE VISIT (OUTPATIENT)
Dept: INTERNAL MEDICINE | Facility: CLINIC | Age: 75
End: 2020-09-16
Payer: MEDICARE

## 2020-09-16 VITALS
SYSTOLIC BLOOD PRESSURE: 132 MMHG | OXYGEN SATURATION: 98 % | WEIGHT: 176.38 LBS | BODY MASS INDEX: 24.69 KG/M2 | HEART RATE: 103 BPM | DIASTOLIC BLOOD PRESSURE: 74 MMHG | HEIGHT: 71 IN

## 2020-09-16 DIAGNOSIS — L81.9 HYPERPIGMENTED SKIN LESION: ICD-10-CM

## 2020-09-16 DIAGNOSIS — N48.1 BALANITIS: Primary | ICD-10-CM

## 2020-09-16 DIAGNOSIS — G47.09 OTHER INSOMNIA: ICD-10-CM

## 2020-09-16 PROCEDURE — 1126F PR PAIN SEVERITY QUANTIFIED, NO PAIN PRESENT: ICD-10-PCS | Mod: HCNC,S$GLB,, | Performed by: INTERNAL MEDICINE

## 2020-09-16 PROCEDURE — 99999 PR PBB SHADOW E&M-EST. PATIENT-LVL V: CPT | Mod: PBBFAC,HCNC,, | Performed by: INTERNAL MEDICINE

## 2020-09-16 PROCEDURE — 3078F DIAST BP <80 MM HG: CPT | Mod: HCNC,CPTII,S$GLB, | Performed by: INTERNAL MEDICINE

## 2020-09-16 PROCEDURE — 1101F PT FALLS ASSESS-DOCD LE1/YR: CPT | Mod: HCNC,CPTII,S$GLB, | Performed by: INTERNAL MEDICINE

## 2020-09-16 PROCEDURE — 3078F PR MOST RECENT DIASTOLIC BLOOD PRESSURE < 80 MM HG: ICD-10-PCS | Mod: HCNC,CPTII,S$GLB, | Performed by: INTERNAL MEDICINE

## 2020-09-16 PROCEDURE — 99214 PR OFFICE/OUTPT VISIT, EST, LEVL IV, 30-39 MIN: ICD-10-PCS | Mod: 25,HCNC,S$GLB, | Performed by: INTERNAL MEDICINE

## 2020-09-16 PROCEDURE — 1159F PR MEDICATION LIST DOCUMENTED IN MEDICAL RECORD: ICD-10-PCS | Mod: HCNC,S$GLB,, | Performed by: INTERNAL MEDICINE

## 2020-09-16 PROCEDURE — 99214 OFFICE O/P EST MOD 30 MIN: CPT | Mod: 25,HCNC,S$GLB, | Performed by: INTERNAL MEDICINE

## 2020-09-16 PROCEDURE — G0008 PR ADMIN INFLUENZA VIRUS VAC: ICD-10-PCS | Mod: HCNC,S$GLB,, | Performed by: INTERNAL MEDICINE

## 2020-09-16 PROCEDURE — 90694 FLU VACCINE - QUADRIVALENT - ADJUVANTED: ICD-10-PCS | Mod: HCNC,S$GLB,, | Performed by: INTERNAL MEDICINE

## 2020-09-16 PROCEDURE — 1101F PR PT FALLS ASSESS DOC 0-1 FALLS W/OUT INJ PAST YR: ICD-10-PCS | Mod: HCNC,CPTII,S$GLB, | Performed by: INTERNAL MEDICINE

## 2020-09-16 PROCEDURE — 90694 VACC AIIV4 NO PRSRV 0.5ML IM: CPT | Mod: HCNC,S$GLB,, | Performed by: INTERNAL MEDICINE

## 2020-09-16 PROCEDURE — 3075F SYST BP GE 130 - 139MM HG: CPT | Mod: HCNC,CPTII,S$GLB, | Performed by: INTERNAL MEDICINE

## 2020-09-16 PROCEDURE — 99999 PR PBB SHADOW E&M-EST. PATIENT-LVL V: ICD-10-PCS | Mod: PBBFAC,HCNC,, | Performed by: INTERNAL MEDICINE

## 2020-09-16 PROCEDURE — G0008 ADMIN INFLUENZA VIRUS VAC: HCPCS | Mod: HCNC,S$GLB,, | Performed by: INTERNAL MEDICINE

## 2020-09-16 PROCEDURE — 1159F MED LIST DOCD IN RCRD: CPT | Mod: HCNC,S$GLB,, | Performed by: INTERNAL MEDICINE

## 2020-09-16 PROCEDURE — 1126F AMNT PAIN NOTED NONE PRSNT: CPT | Mod: HCNC,S$GLB,, | Performed by: INTERNAL MEDICINE

## 2020-09-16 PROCEDURE — 3075F PR MOST RECENT SYSTOLIC BLOOD PRESS GE 130-139MM HG: ICD-10-PCS | Mod: HCNC,CPTII,S$GLB, | Performed by: INTERNAL MEDICINE

## 2020-09-16 RX ORDER — TRAZODONE HYDROCHLORIDE 50 MG/1
50 TABLET ORAL NIGHTLY PRN
Qty: 30 TABLET | Refills: 3 | Status: SHIPPED | OUTPATIENT
Start: 2020-09-16

## 2020-09-16 NOTE — PROGRESS NOTES
Subjective:       Patient ID: Herminio Burger is a 74 y.o. male.    Chief Complaint: Follow-up    Patient is here for followup for chronic conditions.    Inflammation around head of penis wants examined. No urinary symptoms.    No other new symptoms.    Review of Systems   Constitutional: Negative for activity change, fever and unexpected weight change.   Respiratory: Positive for cough (chronic) and wheezing. Negative for chest tightness.    Cardiovascular: Negative for chest pain, palpitations and leg swelling.   Gastrointestinal: Negative for abdominal distention, abdominal pain, nausea and vomiting.   Genitourinary: Positive for penile swelling (penile head). Negative for decreased urine volume, difficulty urinating and scrotal swelling.   Musculoskeletal: Positive for arthralgias. Negative for joint swelling.   Skin: Negative for rash and wound.   Neurological: Negative for tremors, syncope and weakness.   Psychiatric/Behavioral: Negative for confusion.           Objective:      Physical Exam  Constitutional:       General: He is not in acute distress.     Appearance: He is well-developed. He is not diaphoretic.   HENT:      Head: Normocephalic and atraumatic.      Mouth/Throat:      Pharynx: No oropharyngeal exudate.   Eyes:      General: No scleral icterus.     Pupils: Pupils are equal, round, and reactive to light.   Neck:      Musculoskeletal: Normal range of motion and neck supple.      Thyroid: No thyromegaly.   Cardiovascular:      Rate and Rhythm: Normal rate and regular rhythm.      Heart sounds: Normal heart sounds. No murmur. No friction rub. No gallop.    Pulmonary:      Effort: Pulmonary effort is normal. No respiratory distress.      Breath sounds: Wheezing present. No rales.   Abdominal:      General: Bowel sounds are normal. There is no distension.      Palpations: Abdomen is soft. There is no mass.      Tenderness: There is no abdominal tenderness. There is no guarding or rebound.      Comments:  Multiple abd scars     Genitourinary:     Comments: Evidence of gray inflammatory exudate around head of penis c/w candida  Musculoskeletal: Normal range of motion.   Lymphadenopathy:      Cervical: No cervical adenopathy.   Skin:     Findings: No lesion.      Comments: Darkened papules R forearm c/w PIH at areas of follicles   Neurological:      Mental Status: He is alert and oriented to person, place, and time.   Psychiatric:         Behavior: Behavior normal.         Thought Content: Thought content normal.         Assessment:       1. Balanitis    2. Hyperpigmented skin lesion    3. Other insomnia        Plan:       Herminio was seen today for follow-up.    Diagnoses and all orders for this visit:    Balanitis  Patient Instructions   Try over the counter lotrimin or clotrimazole cream twice per day for about 2 weeks          Hyperpigmented skin lesion  -     Ambulatory referral/consult to Dermatology; Future  Likely PIH from recent folliculitis    Other insomnia  -     traZODone (DESYREL) 50 MG tablet; Take 1 tablet (50 mg total) by mouth nightly as needed for Insomnia.        Health Maintenance       Date Due Completion Date    Hepatitis C Screening 1945 ---    Shingles Vaccine (1 of 2) 12/24/1993 ---    TETANUS VACCINE 09/01/2015 9/1/2005    Colonoscopy 10/06/2015 10/6/2014    Lipid Panel 03/06/2023 3/6/2018      No colo indicated given his age and no polyp at last screening    Follow up for Flu vax please and Shingles vaccine please.    Future Appointments   Date Time Provider Department Center   10/6/2020  2:30 PM Jefferson Penn MD [facility] DERM [facility]   2/8/2021  1:40 PM Ted Paula MD [facility] IM [facility] PCW

## 2020-09-29 ENCOUNTER — PATIENT MESSAGE (OUTPATIENT)
Dept: OTHER | Facility: OTHER | Age: 75
End: 2020-09-29

## 2020-10-04 ENCOUNTER — PATIENT OUTREACH (OUTPATIENT)
Dept: ADMINISTRATIVE | Facility: OTHER | Age: 75
End: 2020-10-04

## 2020-10-06 ENCOUNTER — OFFICE VISIT (OUTPATIENT)
Dept: DERMATOLOGY | Facility: CLINIC | Age: 75
End: 2020-10-06
Payer: MEDICARE

## 2020-10-06 DIAGNOSIS — L81.9 HYPERPIGMENTED SKIN LESION: ICD-10-CM

## 2020-10-06 DIAGNOSIS — D22.9 BENIGN MOLE: ICD-10-CM

## 2020-10-06 DIAGNOSIS — M79.672 MECHANICAL PAIN OF LEFT FOOT: ICD-10-CM

## 2020-10-06 DIAGNOSIS — B07.8 OTHER VIRAL WARTS: Primary | ICD-10-CM

## 2020-10-06 PROCEDURE — 1159F MED LIST DOCD IN RCRD: CPT | Mod: HCNC,S$GLB,, | Performed by: DERMATOLOGY

## 2020-10-06 PROCEDURE — 99999 PR PBB SHADOW E&M-EST. PATIENT-LVL III: CPT | Mod: PBBFAC,HCNC,, | Performed by: DERMATOLOGY

## 2020-10-06 PROCEDURE — 99999 PR PBB SHADOW E&M-EST. PATIENT-LVL III: ICD-10-PCS | Mod: PBBFAC,HCNC,, | Performed by: DERMATOLOGY

## 2020-10-06 PROCEDURE — 1126F AMNT PAIN NOTED NONE PRSNT: CPT | Mod: HCNC,S$GLB,, | Performed by: DERMATOLOGY

## 2020-10-06 PROCEDURE — 1159F PR MEDICATION LIST DOCUMENTED IN MEDICAL RECORD: ICD-10-PCS | Mod: HCNC,S$GLB,, | Performed by: DERMATOLOGY

## 2020-10-06 PROCEDURE — 1126F PR PAIN SEVERITY QUANTIFIED, NO PAIN PRESENT: ICD-10-PCS | Mod: HCNC,S$GLB,, | Performed by: DERMATOLOGY

## 2020-10-06 PROCEDURE — 99202 OFFICE O/P NEW SF 15 MIN: CPT | Mod: 25,HCNC,S$GLB, | Performed by: DERMATOLOGY

## 2020-10-06 PROCEDURE — 1101F PR PT FALLS ASSESS DOC 0-1 FALLS W/OUT INJ PAST YR: ICD-10-PCS | Mod: HCNC,CPTII,S$GLB, | Performed by: DERMATOLOGY

## 2020-10-06 PROCEDURE — 17110 DESTRUCTION B9 LES UP TO 14: CPT | Mod: HCNC,S$GLB,, | Performed by: DERMATOLOGY

## 2020-10-06 PROCEDURE — 17110 PR DESTRUCTION BENIGN LESIONS UP TO 14: ICD-10-PCS | Mod: HCNC,S$GLB,, | Performed by: DERMATOLOGY

## 2020-10-06 PROCEDURE — 99202 PR OFFICE/OUTPT VISIT, NEW, LEVL II, 15-29 MIN: ICD-10-PCS | Mod: 25,HCNC,S$GLB, | Performed by: DERMATOLOGY

## 2020-10-06 PROCEDURE — 1101F PT FALLS ASSESS-DOCD LE1/YR: CPT | Mod: HCNC,CPTII,S$GLB, | Performed by: DERMATOLOGY

## 2020-10-06 NOTE — LETTER
October 6, 2020      Ted Paula MD  [address]  [facility] [address]           [facility] - Dermatology [address]  [address]  [facility] [address]  Phone: [phone]  Fax: [phone]          Patient: Herminio Burger   MR Number: [id number]   YOB: 1945   Date of Visit: 10/6/2020       Dear Dr. Ted Paula:    Thank you for referring Herminio Burger to me for evaluation. Attached you will find relevant portions of my assessment and plan of care.    If you have questions, please do not hesitate to call me. I look forward to following Herminio Burger along with you.    Sincerely,    Jefferson Penn MD    Enclosure  CC:  No Recipients    If you would like to receive this communication electronically, please contact [email or web address] or [phone] to request more information on [employer] access.    For providers and/or their staff who would like to refer a patient to [facility], please contact us through our one-stop-shop provider referral line, [facility], at [phone].    If you feel you have received this communication in error or would no longer like to receive these types of communications, please e-mail [email or web address]

## 2020-10-06 NOTE — PROGRESS NOTES
Subjective:       Patient ID:  Herminio Burger is a 74 y.o. male who presents for   Chief Complaint   Patient presents with    Growth     left foot     Patient is a 73 yo male present for growth on left foot for more than a year.  No pain.    Does have foot pain of r sole with plantar flexion.      Review of Systems   Constitutional: Negative for fever and chills.   HENT: Negative for sore throat and mouth sores.    Respiratory: Negative for cough and shortness of breath.    Gastrointestinal: Negative for nausea, vomiting and diarrhea.   Skin: Positive for wears hat. Negative for rash, daily sunscreen use, activity-related sunscreen use and recent sunburn.   Hematologic/Lymphatic: Does not bruise/bleed easily.        Objective:    Physical Exam   Constitutional: He appears well-developed and well-nourished.   Eyes: No conjunctival no injection.   Cardiovascular: There is no dependent edema.     Neurological: He is alert and oriented to person, place, and time.   Psychiatric: He has a normal mood and affect.   Skin:   Areas Examined (abnormalities noted in diagram):   Nails and Digits Inspection Performed             Diagram Legend     Erythematous scaling macule/papule c/w actinic keratosis       Vascular papule c/w angioma      Pigmented verrucoid papule/plaque c/w seborrheic keratosis      Yellow umbilicated papule c/w sebaceous hyperplasia      Irregularly shaped tan macule c/w lentigo     1-2 mm smooth white papules consistent with Milia      Movable subcutaneous cyst with punctum c/w epidermal inclusion cyst      Subcutaneous movable cyst c/w pilar cyst      Firm pink to brown papule c/w dermatofibroma      Pedunculated fleshy papule(s) c/w skin tag(s)      Evenly pigmented macule c/w junctional nevus     Mildly variegated pigmented, slightly irregular-bordered macule c/w mildly atypical nevus      Flesh colored to evenly pigmented papule c/w intradermal nevus       Pink pearly papule/plaque c/w basal cell  carcinoma      Erythematous hyperkeratotic cursted plaque c/w SCC      Surgical scar with no sign of skin cancer recurrence      Open and closed comedones      Inflammatory papules and pustules      Verrucoid papule consistent consistent with wart     Erythematous eczematous patches and plaques     Dystrophic onycholytic nail with subungual debris c/w onychomycosis     Umbilicated papule    Erythematous-base heme-crusted tan verrucoid plaque consistent with inflamed seborrheic keratosis     Erythematous Silvery Scaling Plaque c/w Psoriasis     See annotation      Assessment / Plan:        Other viral warts  Discussed with patient the etiology and pathogenesis of the disease or skin lesion(s) and possible treatments and aggravators.    Reviewed with patient different treatment options and associated risks.  Cryosurgery procedure note:    Verbal consent from the patient is obtained including, but not limited to, risk of hypopigmentation/hyperpigmentation, scar, recurrence of lesion. Liquid nitrogen cryosurgery is applied to 2 verruca with prior paring, as detailed in the physical exam, to produce a freeze injury. 2 consecutive freeze thaw cycles are applied to each verruca. The patient is aware that blisters (possibly blood blisters) may form.    Hyperpigmented skin lesion  -     Ambulatory referral/consult to Dermatology  Discussed with patient the etiology and pathogenesis of the disease or skin lesion(s) and possible treatments and aggravators.      Benign mole  Discussed all of the following with the patient.    Watch for color changes and shape changes and growth in size.  Moles, also called nevi, are small, colored (pigmented) marks on the skin. They have no known purpose. Many moles appear before age 30, but they also increase frequently as people age. Moles most often are not cancer (benign) and are harmless. But some become cancerous (malignant). Thats why you need to watch the moles on your body and tell  your healthcare provider about any that concern you.    Mechanical pain of left foot  Pt to see podi.  Discussed with patient the etiology and pathogenesis of the disease or skin lesion(s) and possible treatments and aggravators.    Reviewed with patient different treatment options and associated risks.             Follow up if symptoms worsen or fail to improve.

## 2020-10-06 NOTE — PATIENT INSTRUCTIONS

## 2020-10-14 ENCOUNTER — TELEPHONE (OUTPATIENT)
Dept: INTERNAL MEDICINE | Facility: CLINIC | Age: 75
End: 2020-10-14

## 2020-10-14 DIAGNOSIS — M47.812 ARTHRITIS OF NECK: ICD-10-CM

## 2020-10-14 RX ORDER — HYDROCODONE BITARTRATE AND ACETAMINOPHEN 5; 325 MG/1; MG/1
1 TABLET ORAL
Qty: 30 TABLET | Refills: 0 | Status: SHIPPED | OUTPATIENT
Start: 2020-10-14

## 2020-10-14 NOTE — TELEPHONE ENCOUNTER
----- Message from Ruby Morris sent at 10/14/2020 12:54 PM CDT -----  Contact: Patient [phone]  Prescription Request:     Name of medication: HYDROcodone-acetaminophen (NORCO) 5-325 mg per tablet    Reason for request: Refill    Pharmacy: [id number] [address] AT [facility] [address]  [place]    Please advise.    Thank You

## 2020-12-11 ENCOUNTER — PATIENT MESSAGE (OUTPATIENT)
Dept: OTHER | Facility: OTHER | Age: 75
End: 2020-12-11

## 2020-12-14 ENCOUNTER — TELEPHONE (OUTPATIENT)
Dept: INTERNAL MEDICINE | Facility: CLINIC | Age: 75
End: 2020-12-14

## 2020-12-14 NOTE — TELEPHONE ENCOUNTER
Pt notified and verbalized understanding. Pt states that he had a COVID test done at [facility] recently and does not want another one.

## 2020-12-14 NOTE — TELEPHONE ENCOUNTER
Hi, please call him back --  I am not sure how to best treat these symptoms, since I am not sure the cause. I recommend that he call where he had the covid test and ask if they can give him the result.  Also, please offer him an urgent care appt here, I specifically work with Giselle Willis.  Thank you, Ted Paula

## 2020-12-14 NOTE — TELEPHONE ENCOUNTER
Hi,  Please call him back --   The current guidelines recommend a 10 day self quarantine starting from the time you were exposed.   If he wants the test we can order it, but he should still do a self quarantine even if the test is negative  Let me know if you have any more questions.  Ted Paula

## 2020-12-14 NOTE — TELEPHONE ENCOUNTER
----- Message from Ramila Luo sent at 12/11/2020  6:00 PM CST -----  Regarding: Patient advice  Contact: Pt  Pt called in regards to getting an order for a covid test      Pts brother has tested positive and pt was exposed       Pt can be reached at [phone] or [phone]

## 2020-12-14 NOTE — TELEPHONE ENCOUNTER
Spoke with pt. Pt declined an  appt. Pt states that he will wait until his test results come back.

## 2020-12-14 NOTE — TELEPHONE ENCOUNTER
----- Message from Valorie Keeneerson sent at 12/14/2020  1:47 PM CST -----  Contact: BOLIVAR [phone]  Type:  Needs Medical Advice    Who Called:--Pt Herminio--    Symptoms (please be specific):--Exposed to covid, light headed and headaches--    How long has patient had these symptoms:--couple days--    Would the patient rather a call back or a response via [facility]?--Call back--    Best Call Back Number: --[phone]--    Additional Information: Pt calling to speak with the doctor only regarding the symptoms listed above. He states covid test done on Saturday but the results are not back yet and he wants to see what he can do in the mean time for symptoms listed above? Please call to advise.

## 2020-12-15 ENCOUNTER — PATIENT MESSAGE (OUTPATIENT)
Dept: OTHER | Facility: OTHER | Age: 75
End: 2020-12-15

## 2020-12-16 ENCOUNTER — PES CALL (OUTPATIENT)
Dept: ADMINISTRATIVE | Facility: CLINIC | Age: 75
End: 2020-12-16

## 2020-12-16 ENCOUNTER — TELEPHONE (OUTPATIENT)
Dept: INTERNAL MEDICINE | Facility: CLINIC | Age: 75
End: 2020-12-16

## 2020-12-16 DIAGNOSIS — U07.1 COVID-19 VIRUS INFECTION: Primary | ICD-10-CM

## 2020-12-16 NOTE — TELEPHONE ENCOUNTER
Hi,  For the muscle and body aches --  Tylenol or acetaminophen 500-1000mg every 6-8 hours as needed for pain/fevers.  Also he can take ibuprofen or advil 200-400mg every 6-8 hours as needed for pain/fevers.  It is important that he stay well hydrated as well.    Please let him know that I placed an order for the Covid surveillance program for him --  Orders Placed This Encounter    COVID-19 Surveillance Program    pulse oximeter (PULSE OXIMETER) device     He should stay isolated for at least 10 days starting from when he first started feeling sick,.    Let me know if patient has any questions.  Thank you, Ted Paula

## 2020-12-16 NOTE — TELEPHONE ENCOUNTER
----- Message from Kelsea Plunkett sent at 12/16/2020  1:33 PM CST -----  Contact: [phone]  Would like to get medical advice.  Pharmacy name and phone # (copy from chart):  [id number] - [facility] [address] AT [facility] [phone] (Phone)  [phone] (Fax)  Comments:  Patient has tested positive for covid  from [facility] testing site and he would like to know what should he do, he has COPD, muscle and body aches, congested and headaches, please call and advise.

## 2020-12-17 NOTE — TELEPHONE ENCOUNTER
Hi, please let him know that we do not have any medicines that treat covid-19 for patient at home. There are a few medicines available only for hospitalized patients, but we cannot use these unless a patient is hospitalized.  Let me know if patient has any questions.  Thank you, Ted Paula

## 2020-12-17 NOTE — TELEPHONE ENCOUNTER
The patient has been notified of this information and all questions answered.    Pt states that he has heard about medications given to treat COVID 19 and would like to know more about those medications and have them prescribed to him.

## 2020-12-19 ENCOUNTER — NURSE TRIAGE (OUTPATIENT)
Dept: ADMINISTRATIVE | Facility: CLINIC | Age: 75
End: 2020-12-19

## 2020-12-19 ENCOUNTER — TELEPHONE (OUTPATIENT)
Dept: ADMINISTRATIVE | Facility: CLINIC | Age: 75
End: 2020-12-19

## 2020-12-19 NOTE — TELEPHONE ENCOUNTER
Called pt for enrollment in Covid Surveillance program. Pt verified name and . Pt has pulse ox. Explained how to use pulse ox and advised if SpO2 ever falls below 94 and does not increase after a few deep breaths to return to ED for eval. Took VS and symptoms info over the phone and all WNL so no further triage required at this time. But pt is complaining of chest tightness at rest consistently and pt has not had a chest xray yet. Pt sounds pretty weak but not sure if because he is sick or older age. Pt states chest tightness and SOB are not new symptoms. Pt did have a temp of 102 yesterday that responded to tylenol. Escalated to HECTOR, Yara GEORGE Caraballo,  for further evaluation. Gave number to O for worsening symptoms or any futher concerns. Transferred to [facility], for completion of enrollment process.    Sp02: 95    Pulse: 106    Temperature: 100.0    SOB at rest (0-5): 0    SOB with activity (0-5): 2    Worsening symptoms: no    Fever symptoms: 102 yesterday that responded to tylenol    Increased home oxygen: n/a    Called patient to review COVID-19 Surveillance Program enrollment process.    Smartphone: yes    [facility]ctor: pending    Program consent: pending    Pulse oximeter status: yes    Verified emergency contacts: pending    Program Overview: Reviewed , no response process, and importance of correct emergency contacts in event that well-being check is warranted. Patient will call [phone]  to speak with an OnCall nurse, if needed. Pt aware that if Sp02 <94% or if they have any worsening symptoms, they need to go to the emergency department. If they are having a medical emergency, they will call 911. Otherwise, patient will continue to submit data as scheduled. Reviewed importance of wearing mask if self or family members leave the house.     Patient had no further questions      Reason for Disposition   SEVERE or constant chest pain (Exception: mild central chest pain,  present only when coughing)    Additional Information   Negative: Severe difficulty breathing (e.g., struggling for each breath, speaks in single words)   Negative: Difficult to awaken or acting confused (e.g., disoriented, slurred speech)   Negative: Bluish (or gray) lips or face now   Negative: Shock suspected (e.g., cold/pale/clammy skin, too weak to stand, low BP, rapid pulse)   Negative: Sounds like a life-threatening emergency to the triager   Negative: [1] COVID-19 suspected (e.g., cough, fever, shortness of breath) AND [2] mild symptoms AND [3] public health department recommends testing   Negative: [1] COVID-19 exposure AND [2] no symptoms   Negative: COVID-19 and Breastfeeding, questions about    Protocols used: CORONAVIRUS (COVID-19) - DIAGNOSED OR OLDOYMATS-Y-ES

## 2020-12-19 NOTE — TELEPHONE ENCOUNTER
Called patient due to RN escalation in COVID Surveillance program. Pt escalated due to chest tightness.     Patient location: [place], home    Vitals: Sp02 : 95%. P: 106. Temp: 100.0  Ambulatory Sp02 on the phone (if applicable): 91%     74 y.o. male with pertinent PMHx COPD, HTN, atherosclerosis of the aorta on day 8 of Covid symptoms. Positive Covid screen . CXR done. Home oxygen: no. COVID-19 Hospitalization History:     HPI:  Patient has sob with minimal activity, chest tightness. Had fever last night.   Recheck on O2 sats walking 91-90% and .     ROS: Denies worsening cough, light headedness, fever, chills, diaphoresis, chest pain, abdominal pain, emesis, diarrhea or further symptoms.     Assessment: Vitals see above. During phone call, patient appears alert and oriented. Appeats to have difficulty speak in full sentences without difficulty. No audible wheezing heard during call.    Plan:    Reviewed with patient the reasons for seeking emergency care. Pt aware that if Sp02 <94% or if they have any worsening symptoms, they need to go to the emergency department. If they are having a medical emergency, they will call 911. Otherwise, patient will continue to submit data as scheduled. Reviewed importance of wearing mask if self or family members leave the house.     Advised next steps: ED referral

## 2020-12-20 ENCOUNTER — TELEPHONE (OUTPATIENT)
Dept: ADMINISTRATIVE | Facility: CLINIC | Age: 75
End: 2020-12-20

## 2020-12-20 NOTE — TELEPHONE ENCOUNTER
COVID Surveillance Enrollment Call #2: was able to speak with his wife, Rochelle. After the Surveillance call, patient and wife called 911. He was taken to [facility] and he is admitted. Will remove tasks at this time, will route to PCP and will ask them to re-enroll when he is discharged.

## 2021-01-13 ENCOUNTER — PATIENT OUTREACH (OUTPATIENT)
Dept: ADMINISTRATIVE | Facility: OTHER | Age: 76
End: 2021-01-13

## 2021-04-08 DIAGNOSIS — I10 ESSENTIAL HYPERTENSION: ICD-10-CM

## 2021-04-08 RX ORDER — AMLODIPINE BESYLATE 5 MG/1
TABLET ORAL
Qty: 90 TABLET | Refills: 2 | OUTPATIENT
Start: 2021-04-08

## 2021-10-05 DIAGNOSIS — I10 ESSENTIAL HYPERTENSION: ICD-10-CM

## 2021-10-05 RX ORDER — LOSARTAN POTASSIUM 100 MG/1
TABLET ORAL
Qty: 90 TABLET | Refills: 11 | OUTPATIENT
Start: 2021-10-05

## 2023-01-08 NOTE — TELEPHONE ENCOUNTER
Problem: Adult Inpatient Plan of Care  Goal: Plan of Care Review  Outcome: Ongoing, Progressing  Goal: Patient-Specific Goal (Individualized)  Outcome: Ongoing, Progressing  Goal: Absence of Hospital-Acquired Illness or Injury  Outcome: Ongoing, Progressing  Goal: Optimal Comfort and Wellbeing  Outcome: Ongoing, Progressing  Goal: Readiness for Transition of Care  Outcome: Ongoing, Progressing     Problem: Diabetes Comorbidity  Goal: Blood Glucose Level Within Targeted Range  Outcome: Ongoing, Progressing     Problem: Fluid Imbalance (Pneumonia)  Goal: Fluid Balance  Outcome: Ongoing, Progressing     Problem: Infection (Pneumonia)  Goal: Resolution of Infection Signs and Symptoms  Outcome: Ongoing, Progressing     Problem: Respiratory Compromise (Pneumonia)  Goal: Effective Oxygenation and Ventilation  Outcome: Ongoing, Progressing     Problem: Infection  Goal: Absence of Infection Signs and Symptoms  Outcome: Ongoing, Progressing      Hi, I called him and was not able to leave a message -- please call him back and let him that I want to discuss his chest CT with him.  Thank you, Ted Paula      I called patient and left a message

## (undated) DEVICE — SET IRR URLGY 2LINE UNIV SPIKE

## (undated) DEVICE — SOL NACL IRR 1000ML BTL

## (undated) DEVICE — SYR 50ML CATH TIP

## (undated) DEVICE — GOWN SMARTGOWN LVL4 X-LONG XL

## (undated) DEVICE — Device

## (undated) DEVICE — SYR 10CC LUER LOCK

## (undated) DEVICE — SOL IRR NACL .9% 3000ML

## (undated) DEVICE — PACK CYSTO

## (undated) DEVICE — TRAY CYSTO BASIN